# Patient Record
Sex: MALE | Race: WHITE | NOT HISPANIC OR LATINO | Employment: FULL TIME | URBAN - METROPOLITAN AREA
[De-identification: names, ages, dates, MRNs, and addresses within clinical notes are randomized per-mention and may not be internally consistent; named-entity substitution may affect disease eponyms.]

---

## 2017-01-04 ENCOUNTER — TRANSFERRED RECORDS (OUTPATIENT)
Dept: HEALTH INFORMATION MANAGEMENT | Facility: CLINIC | Age: 37
End: 2017-01-04

## 2017-02-03 ENCOUNTER — OFFICE VISIT (OUTPATIENT)
Dept: FAMILY MEDICINE | Facility: CLINIC | Age: 37
End: 2017-02-03
Payer: COMMERCIAL

## 2017-02-03 VITALS
HEART RATE: 62 BPM | DIASTOLIC BLOOD PRESSURE: 62 MMHG | SYSTOLIC BLOOD PRESSURE: 110 MMHG | OXYGEN SATURATION: 99 % | TEMPERATURE: 97.3 F | BODY MASS INDEX: 24.41 KG/M2 | RESPIRATION RATE: 16 BRPM | HEIGHT: 69 IN | WEIGHT: 164.8 LBS

## 2017-02-03 DIAGNOSIS — Z00.01 ENCOUNTER FOR ROUTINE ADULT HEALTH EXAMINATION WITH ABNORMAL FINDINGS: Primary | ICD-10-CM

## 2017-02-03 DIAGNOSIS — I10 ESSENTIAL HYPERTENSION: ICD-10-CM

## 2017-02-03 DIAGNOSIS — Z23 NEED FOR PROPHYLACTIC VACCINATION AND INOCULATION AGAINST INFLUENZA: ICD-10-CM

## 2017-02-03 DIAGNOSIS — J45.20 MILD INTERMITTENT ASTHMA WITHOUT COMPLICATION: ICD-10-CM

## 2017-02-03 LAB
ALBUMIN SERPL-MCNC: 3.8 G/DL (ref 3.4–5)
ALP SERPL-CCNC: 64 U/L (ref 40–150)
ALT SERPL W P-5'-P-CCNC: 23 U/L (ref 0–70)
ANION GAP SERPL CALCULATED.3IONS-SCNC: 5 MMOL/L (ref 3–14)
AST SERPL W P-5'-P-CCNC: 14 U/L (ref 0–45)
BILIRUB SERPL-MCNC: 0.5 MG/DL (ref 0.2–1.3)
BUN SERPL-MCNC: 13 MG/DL (ref 7–30)
CALCIUM SERPL-MCNC: 9.7 MG/DL (ref 8.5–10.1)
CHLORIDE SERPL-SCNC: 105 MMOL/L (ref 94–109)
CHOLEST SERPL-MCNC: 179 MG/DL
CO2 SERPL-SCNC: 32 MMOL/L (ref 20–32)
CREAT SERPL-MCNC: 0.99 MG/DL (ref 0.66–1.25)
CREAT UR-MCNC: 227 MG/DL
ERYTHROCYTE [DISTWIDTH] IN BLOOD BY AUTOMATED COUNT: 12 % (ref 10–15)
GFR SERPL CREATININE-BSD FRML MDRD: 85 ML/MIN/1.7M2
GLUCOSE SERPL-MCNC: 89 MG/DL (ref 70–99)
HCT VFR BLD AUTO: 40.6 % (ref 40–53)
HDLC SERPL-MCNC: 57 MG/DL
HGB BLD-MCNC: 13.8 G/DL (ref 13.3–17.7)
LDLC SERPL CALC-MCNC: 109 MG/DL
MCH RBC QN AUTO: 30.7 PG (ref 26.5–33)
MCHC RBC AUTO-ENTMCNC: 34 G/DL (ref 31.5–36.5)
MCV RBC AUTO: 90 FL (ref 78–100)
MICROALBUMIN UR-MCNC: 7 MG/L
MICROALBUMIN/CREAT UR: 3.02 MG/G CR (ref 0–17)
NONHDLC SERPL-MCNC: 122 MG/DL
PLATELET # BLD AUTO: 316 10E9/L (ref 150–450)
POTASSIUM SERPL-SCNC: 4.9 MMOL/L (ref 3.4–5.3)
PROT SERPL-MCNC: 7.9 G/DL (ref 6.8–8.8)
RBC # BLD AUTO: 4.49 10E12/L (ref 4.4–5.9)
SODIUM SERPL-SCNC: 142 MMOL/L (ref 133–144)
TRIGL SERPL-MCNC: 64 MG/DL
WBC # BLD AUTO: 3.7 10E9/L (ref 4–11)

## 2017-02-03 PROCEDURE — 90471 IMMUNIZATION ADMIN: CPT | Performed by: FAMILY MEDICINE

## 2017-02-03 PROCEDURE — 90686 IIV4 VACC NO PRSV 0.5 ML IM: CPT | Performed by: FAMILY MEDICINE

## 2017-02-03 PROCEDURE — 80053 COMPREHEN METABOLIC PANEL: CPT | Performed by: FAMILY MEDICINE

## 2017-02-03 PROCEDURE — 85027 COMPLETE CBC AUTOMATED: CPT | Performed by: FAMILY MEDICINE

## 2017-02-03 PROCEDURE — 99395 PREV VISIT EST AGE 18-39: CPT | Mod: 25 | Performed by: FAMILY MEDICINE

## 2017-02-03 PROCEDURE — 82043 UR ALBUMIN QUANTITATIVE: CPT | Performed by: FAMILY MEDICINE

## 2017-02-03 PROCEDURE — 36415 COLL VENOUS BLD VENIPUNCTURE: CPT | Performed by: FAMILY MEDICINE

## 2017-02-03 PROCEDURE — 80061 LIPID PANEL: CPT | Performed by: FAMILY MEDICINE

## 2017-02-03 RX ORDER — LISINOPRIL AND HYDROCHLOROTHIAZIDE 12.5; 2 MG/1; MG/1
1 TABLET ORAL DAILY
Qty: 90 TABLET | Refills: 3 | Status: SHIPPED | OUTPATIENT
Start: 2017-02-03 | End: 2018-02-13

## 2017-02-03 NOTE — NURSING NOTE
"Chief Complaint   Patient presents with     Physical       Initial /62 mmHg  Pulse 62  Temp(Src) 97.3  F (36.3  C) (Oral)  Resp 16  Ht 5' 9\" (1.753 m)  Wt 164 lb 12.8 oz (74.753 kg)  BMI 24.33 kg/m2  SpO2 99% Estimated body mass index is 24.33 kg/(m^2) as calculated from the following:    Height as of this encounter: 5' 9\" (1.753 m).    Weight as of this encounter: 164 lb 12.8 oz (74.753 kg).  BP completed using cuff size: cody Velazquez CMA      "

## 2017-02-03 NOTE — PROGRESS NOTES
SUBJECTIVE:     CC: Greg Tripp is an 36 year old male who presents for preventative health visit.     Physical  Annual:     Getting at least 3 servings of Calcium per day::  Yes    Bi-annual eye exam::  Yes    Dental care twice a year::  Yes    Sleep apnea or symptoms of sleep apnea::  None    Diet::  Low salt    Frequency of exercise::  2-3 days/week    Duration of exercise::  45-60 minutes    Taking medications regularly::  Yes    Medication side effects::  None    Additional concerns today::  YES    Today's PHQ-2 Score:   PHQ-2 ( 1999 Pfizer) 2/3/2017   Q1: Little interest or pleasure in doing things -   Q2: Feeling down, depressed or hopeless -   PHQ-2 Score -   Little interest or pleasure in doing things Not at all   Feeling down, depressed or hopeless Not at all   PHQ-2 Score 0       Abuse: Current or Past(Physical, Sexual or Emotional)- No  Do you feel safe in your environment - Yes    Social History   Substance Use Topics     Smoking status: Never Smoker      Smokeless tobacco: Never Used     Alcohol Use: 1.0 oz/week      Comment: occ.     The patient does not drink >3 drinks per day nor >7 drinks per week.    Last PSA: No results found for: PSA    Recent Labs   Lab Test  02/05/16   1206   CHOL  182   HDL  69   LDL  96   TRIG  87   NHDL  113       Reviewed orders with patient. Reviewed health maintenance and updated orders accordingly - Yes    All Histories reviewed and updated in Epic.      ROS:  C: NEGATIVE for fever, chills, change in weight  I: NEGATIVE for worrisome rashes, moles or lesions  E: NEGATIVE for vision changes or irritation  ENT: NEGATIVE for ear, mouth and throat problems  R: NEGATIVE for significant cough or SOB  CV: NEGATIVE for chest pain, palpitations or peripheral edema  GI: NEGATIVE for nausea, abdominal pain, heartburn, or change in bowel habits   male: negative for dysuria, hematuria, decreased urinary stream, erectile dysfunction, urethral discharge  M: NEGATIVE for  "significant arthralgias or myalgia  N: NEGATIVE for weakness, dizziness or paresthesias  P: NEGATIVE for changes in mood or affect    Problem list, Medication list, Allergies, and Medical/Social/Surgical histories reviewed in [facility] and updated as appropriate.  OBJECTIVE:     /62 mmHg  Pulse 62  Temp(Src) 97.3  F (36.3  C) (Oral)  Resp 16  Ht 5' 9\" (1.753 m)  Wt 164 lb 12.8 oz (74.753 kg)  BMI 24.33 kg/m2  SpO2 99%  EXAM:  GENERAL: healthy, alert and no distress  EYES: Eyes grossly normal to inspection, PERRL and conjunctivae and sclerae normal  HENT: ear canals and TM's normal, nose and mouth without ulcers or lesions  NECK: no adenopathy, no asymmetry, masses, or scars and thyroid normal to palpation  RESP: lungs clear to auscultation - no rales, rhonchi or wheezes  CV: regular rate and rhythm, normal S1 S2, no S3 or S4, no murmur, click or rub, no peripheral edema and peripheral pulses strong  ABDOMEN: soft, nontender, no hepatosplenomegaly, no masses and bowel sounds normal  MS: no gross musculoskeletal defects noted, no edema  SKIN: no suspicious lesions or rashes  NEURO: Normal strength and tone, mentation intact and speech normal  PSYCH: mentation appears normal, affect normal/bright    ASSESSMENT/PLAN:         ICD-10-CM    1. Encounter for routine adult health examination with abnormal findings Z00.01 Lipid Profile with reflex to direct LDL     JUST IN CASE     CBC with platelets   2. Need for prophylactic vaccination and inoculation against influenza Z23 FLU VAC, SPLIT VIRUS IM > 3 YO (QUADRIVALENT) [16499]     Vaccine Administration, Initial [73842]   3. Mild intermittent asthma without complication J45.20    4. Essential hypertension I10 Albumin Random Urine Quantitative     lisinopril-hydrochlorothiazide (PRINZIDE/ZESTORETIC) 20-12.5 MG per tablet     Comprehensive metabolic panel       COUNSELING:   Reviewed preventive health counseling, as reflected in patient instructions       Regular " "exercise       Healthy diet/nutrition       Vaccinated for: Influenza       HIV screeninx in teen years, 1x in adult years, and at intervals if high risk         reports that he has never smoked. He has never used smokeless tobacco.    Estimated body mass index is 24.33 kg/(m^2) as calculated from the following:    Height as of this encounter: 5' 9\" (1.753 m).    Weight as of this encounter: 164 lb 12.8 oz (74.753 kg).       Counseling Resources:  ATP IV Guidelines  Pooled Cohorts Equation Calculator  FRAX Risk Assessment  ICSI Preventive Guidelines  Dietary Guidelines for Americans,   USDA's MyPlate  ASA Prophylaxis  Lung CA Screening    Eloy Gillespie MD  [facility]  Answers for HPI/ROS submitted by the patient on 2/3/2017   PHQ-2 Depressed: Not at all, Not at all  PHQ-2 Score: 0      "

## 2017-02-03 NOTE — PROGRESS NOTES
Injectable Influenza Immunization Documentation    1.  Is the person to be vaccinated sick today?  No, has congestion    2. Does the person to be vaccinated have an allergy to eggs or to a component of the vaccine?  No    3. Has the person to be vaccinated today ever had a serious reaction to influenza vaccine in the past?  No    4. Has the person to be vaccinated ever had Guillain-Montague syndrome?  No     Form completed by Debbie Velazquez CMA

## 2017-02-03 NOTE — MR AVS SNAPSHOT
After Visit Summary   2/3/2017    Greg Tripp    MRN: [id number]           Patient Information     Date Of Birth          1980        Visit Information        Provider Department      2/3/2017 9:00 AM Eloy Gillespie MD [facility]        Today's Diagnoses     Encounter for routine adult health examination with abnormal findings    -  1     Need for prophylactic vaccination and inoculation against influenza         Mild intermittent asthma without complication         Essential hypertension           Care Instructions      Preventive Health Recommendations  Male Ages 26 - 39    Yearly exam:             See your health care provider every year in order to  o   Review health changes.   o   Discuss preventive care.    o   Review your medicines if your doctor has prescribed any.    You should be tested each year for STDs (sexually transmitted diseases), if you re at risk.     After age 35, talk to your provider about cholesterol testing. If you are at risk for heart disease, have your cholesterol tested at least every 5 years.     If you are at risk for diabetes, you should have a diabetes test (fasting glucose).  Shots: Get a flu shot each year. Get a tetanus shot every 10 years.     Nutrition:    Eat at least 5 servings of fruits and vegetables daily.     Eat whole-grain bread, whole-wheat pasta and brown rice instead of white grains and rice.     Talk to your provider about Calcium and Vitamin D.     Lifestyle    Exercise for at least 150 minutes a week (30 minutes a day, 5 days a week). This will help you control your weight and prevent disease.     Limit alcohol to one drink per day.     No smoking.     Wear sunscreen to prevent skin cancer.     See your dentist every six months for an exam and cleaning.             Follow-ups after your visit        Who to contact     If you have questions or need follow up information about today's clinic visit or your schedule  "please contact [facility] directly at [phone].  Normal or non-critical lab and imaging results will be communicated to you by MyChart, letter or phone within 4 business days after the clinic has received the results. If you do not hear from us within 7 days, please contact the clinic through AQHhart or phone. If you have a critical or abnormal lab result, we will notify you by phone as soon as possible.  Submit refill requests through "[employer] or call your pharmacy and they will forward the refill request to us. Please allow 3 business days for your refill to be completed.          Additional Information About Your Visit        AQHharWebcollage Information     "[employer] gives you secure access to your electronic health record. If you see a primary care provider, you can also send messages to your care team and make appointments. If you have questions, please call your primary care clinic.  If you do not have a primary care provider, please call [phone] and they will assist you.        Care EveryWhere ID     This is your Care EveryWhere ID. This could be used by other organizations to access your [place] medical records  [id number]        Your Vitals Were     Pulse Temperature Respirations Height BMI (Body Mass Index) Pulse Oximetry    62 97.3  F (36.3  C) (Oral) 16 5' 9\" (1.753 m) 24.33 kg/m2 99%       Blood Pressure from Last 3 Encounters:   02/03/17 110/62   12/19/16 122/86   05/09/16 130/78    Weight from Last 3 Encounters:   02/03/17 164 lb 12.8 oz (74.753 kg)   12/19/16 166 lb 6 oz (75.467 kg)   05/09/16 172 lb (78.019 kg)              We Performed the Following     Albumin Random Urine Quantitative     CBC with platelets     FLU VAC, SPLIT VIRUS IM > 3 YO (QUADRIVALENT) [76546]     JUST IN CASE     Lipid Profile with reflex to direct LDL     Vaccine Administration, Initial [86545]          Today's Medication Changes          These changes are accurate as of: 2/3/17  9:52 AM.  If you have " any questions, ask your nurse or doctor.               These medicines have changed or have updated prescriptions.        Dose/Directions    lisinopril-hydrochlorothiazide 20-12.5 MG per tablet   Commonly known as:  PRINZIDE/ZESTORETIC   This may have changed:  additional instructions   Used for:  Essential hypertension   Changed by:  Eloy Gillespie MD        Dose:  1 tablet   Take 1 tablet by mouth daily   Quantity:  90 tablet   Refills:  3            Where to get your medicines      These medications were sent to [id number] - [place], [place] - [address] AT [facility] [address]  [address], [place] [address]    Hours:  24-hours Phone:  [phone]    - lisinopril-hydrochlorothiazide 20-12.5 MG per tablet             Primary Care Provider Office Phone # Fax #    Eloy Gillespie [phone]       [facility] [address]  [facility] [address]        Thank you!     Thank you for choosing [facility]  for your care. Our goal is always to provide you with excellent care. Hearing back from our patients is one way we can continue to improve our services. Please take a few minutes to complete the written survey that you may receive in the mail after your visit with us. Thank you!             Your Updated Medication List - Protect others around you: Learn how to safely use, store and throw away your medicines at www.disposemymeds.org.          This list is accurate as of: 2/3/17  9:52 AM.  Always use your most recent med list.                   Brand Name Dispense Instructions for use    albuterol 108 (90 BASE) MCG/ACT Inhaler    albuterol    1 Inhaler    Inhale 2 puffs into the lungs 4 times daily as needed for shortness of breath / dyspnea or wheezing       lisinopril-hydrochlorothiazide 20-12.5 MG per tablet    PRINZIDE/ZESTORETIC    90 tablet    Take 1 tablet by mouth daily       UNABLE TO FIND      MEDICATION NAME: topical  steroid

## 2017-02-23 ASSESSMENT — ASTHMA QUESTIONNAIRES: ACT_TOTALSCORE: 25

## 2017-12-24 ENCOUNTER — TRANSFERRED RECORDS (OUTPATIENT)
Dept: HEALTH INFORMATION MANAGEMENT | Facility: CLINIC | Age: 37
End: 2017-12-24

## 2017-12-25 ENCOUNTER — TRANSFERRED RECORDS (OUTPATIENT)
Dept: HEALTH INFORMATION MANAGEMENT | Facility: CLINIC | Age: 37
End: 2017-12-25

## 2017-12-26 ENCOUNTER — OFFICE VISIT (OUTPATIENT)
Dept: FAMILY MEDICINE | Facility: CLINIC | Age: 37
End: 2017-12-26
Payer: COMMERCIAL

## 2017-12-26 VITALS
BODY MASS INDEX: 25.42 KG/M2 | RESPIRATION RATE: 16 BRPM | DIASTOLIC BLOOD PRESSURE: 69 MMHG | TEMPERATURE: 98.1 F | OXYGEN SATURATION: 100 % | SYSTOLIC BLOOD PRESSURE: 115 MMHG | WEIGHT: 172.13 LBS | HEART RATE: 70 BPM

## 2017-12-26 DIAGNOSIS — N30.01 ACUTE CYSTITIS WITH HEMATURIA: Primary | ICD-10-CM

## 2017-12-26 LAB
ALBUMIN UR-MCNC: NEGATIVE MG/DL
APPEARANCE UR: CLEAR
BACTERIA #/AREA URNS HPF: ABNORMAL /HPF
BILIRUB UR QL STRIP: NEGATIVE
COLOR UR AUTO: YELLOW
GLUCOSE UR STRIP-MCNC: NEGATIVE MG/DL
HGB UR QL STRIP: ABNORMAL
HYALINE CASTS #/AREA URNS LPF: ABNORMAL /LPF
KETONES UR STRIP-MCNC: NEGATIVE MG/DL
LEUKOCYTE ESTERASE UR QL STRIP: NEGATIVE
NITRATE UR QL: NEGATIVE
NON-SQ EPI CELLS #/AREA URNS LPF: ABNORMAL /LPF
PH UR STRIP: 7 PH (ref 5–7)
RBC #/AREA URNS AUTO: ABNORMAL /HPF
SOURCE: ABNORMAL
SP GR UR STRIP: 1.01 (ref 1–1.03)
TRANS CELLS #/AREA URNS HPF: ABNORMAL /HPF
UROBILINOGEN UR STRIP-ACNC: 0.2 EU/DL (ref 0.2–1)
WBC #/AREA URNS AUTO: ABNORMAL /HPF

## 2017-12-26 PROCEDURE — 99213 OFFICE O/P EST LOW 20 MIN: CPT | Performed by: FAMILY MEDICINE

## 2017-12-26 PROCEDURE — 81001 URINALYSIS AUTO W/SCOPE: CPT | Performed by: FAMILY MEDICINE

## 2017-12-26 RX ORDER — CEFDINIR 300 MG/1
300 CAPSULE ORAL 2 TIMES DAILY
COMMUNITY
Start: 2017-12-24 | End: 2018-01-03

## 2017-12-26 NOTE — PROGRESS NOTES
SUBJECTIVE:   Greg Tripp is a 37 year old male who presents to clinic today for the following health issues:    ED/UC Followup:    Facility:  [facility]   Date of visit: 12/24/17  Reason for visit: blood in urine   Current Status: improved.       Patient seen for +UTI in Urgency Room in [place].  Here today for FU.  Feeling much better on Cefdinir 300mg once daily.  Urgency and frequency have resolved.    Problem list and histories reviewed & adjusted, as indicated.  Additional history: as documented    Patient Active Problem List   Diagnosis     Viral warts     Seasonal allergic rhinitis     Hypertension     Mild intermittent asthma without complication     Past Surgical History:   Procedure Laterality Date     C APPENDECTOMY  1/04     SURGICAL HISTORY OF -   7/03    right knee arthoscopy       Social History   Substance Use Topics     Smoking status: Never Smoker     Smokeless tobacco: Never Used     Alcohol use 1.0 oz/week      Comment: occ.     Family History   Problem Relation Age of Onset     Depression Paternal Grandmother      Hypertension Father      Family History Negative Mother          Current Outpatient Prescriptions   Medication Sig Dispense Refill     cefdinir (OMNICEF) 300 MG capsule Take 300 mg by mouth 2 times daily       UNABLE TO FIND MEDICATION NAME: topical steroid       lisinopril-hydrochlorothiazide (PRINZIDE/ZESTORETIC) 20-12.5 MG per tablet Take 1 tablet by mouth daily 90 tablet 3     albuterol (ALBUTEROL) 108 (90 BASE) MCG/ACT inhaler Inhale 2 puffs into the lungs 4 times daily as needed for shortness of breath / dyspnea or wheezing 1 Inhaler PRN     Allergies   Allergen Reactions     Penicillins      BP Readings from Last 3 Encounters:   12/26/17 115/69   02/03/17 110/62   12/19/16 122/86    Wt Readings from Last 3 Encounters:   12/26/17 172 lb 2 oz (78.1 kg)   02/03/17 164 lb 12.8 oz (74.8 kg)   12/19/16 166 lb 6 oz (75.5 kg)         Reviewed and updated as needed  this visit by clinical staffTobacco  Allergies  Meds  Med Hx  Surg Hx  Fam Hx  Soc Hx      Reviewed and updated as needed this visit by Provider         ROS:  Constitutional, HEENT, cardiovascular, pulmonary, gi and gu systems are negative, except as otherwise noted.      OBJECTIVE:   /69 (BP Location: Left arm, Patient Position: Sitting, Cuff Size: Adult Regular)  Pulse 70  Temp 98.1  F (36.7  C) (Oral)  Resp 16  Wt 172 lb 2 oz (78.1 kg)  SpO2 100%  BMI 25.42 kg/m2  Body mass index is 25.42 kg/(m^2).  GENERAL: healthy, alert and no distress  EYES: Eyes grossly normal to inspection, PERRL and conjunctivae and sclerae normal  HENT: ear canals and TM's normal, nose and mouth without ulcers or lesions  NECK: no adenopathy, no asymmetry, masses, or scars and thyroid normal to palpation  RESP: lungs clear to auscultation - no rales, rhonchi or wheezes  CV: regular rate and rhythm, normal S1 S2, no S3 or S4, no murmur, click or rub, no peripheral edema and peripheral pulses strong  ABDOMEN: soft, nontender, no hepatosplenomegaly, no masses and bowel sounds normal  MS: no gross musculoskeletal defects noted, no edema  SKIN: no suspicious lesions or rashes  BACK: no CVA tenderness, no paralumbar tenderness  PSYCH: mentation appears normal, affect normal/bright    Diagnostic Test Results:  Results for orders placed or performed in visit on 12/26/17 (from the past 24 hour(s))   UA reflex to Microscopic and Culture   Result Value Ref Range    Color Urine Yellow     Appearance Urine Clear     Glucose Urine Negative NEG^Negative mg/dL    Bilirubin Urine Negative NEG^Negative    Ketones Urine Negative NEG^Negative mg/dL    Specific Gravity Urine 1.015 1.003 - 1.035    Blood Urine Trace (A) NEG^Negative    pH Urine 7.0 5.0 - 7.0 pH    Protein Albumin Urine Negative NEG^Negative mg/dL    Urobilinogen Urine 0.2 0.2 - 1.0 EU/dL    Nitrite Urine Negative NEG^Negative    Leukocyte Esterase Urine Negative NEG^Negative     Source Midstream Urine    Urine Microscopic   Result Value Ref Range    WBC Urine O - 2 OTO2^O - 2 /HPF    RBC Urine O - 2 OTO2^O - 2 /HPF    Hyaline Casts O - 2 OTO2^O - 2 /LPF    Squamous Epithelial /LPF Urine Few FEW^Few /LPF    Transitional Epi Few FEW^Few /HPF    Bacteria Urine Few (A) NEG^Negative /HPF       ASSESSMENT/PLAN:     1. Acute cystitis with hematuria    - UA reflex to Microscopic and Culture  - Urine Microscopic    Improving on Cefdinir.  Continue increased fluids.  FU if no change or worsening symptoms prn.    Denise Marques MD  [facility]

## 2017-12-26 NOTE — MR AVS SNAPSHOT
After Visit Summary   12/26/2017    Greg Tripp    MRN: [id number]           Patient Information     Date Of Birth          1980        Visit Information        Provider Department      12/26/2017 10:20 AM Denise Marques MD [facility]        Today's Diagnoses     Acute cystitis with hematuria    -  1       Follow-ups after your visit        Follow-up notes from your care team     Return if symptoms worsen or fail to improve.      Who to contact     If you have questions or need follow up information about today's clinic visit or your schedule please contact [facility] directly at [phone].  Normal or non-critical lab and imaging results will be communicated to you by MyChart, letter or phone within 4 business days after the clinic has received the results. If you do not hear from us within 7 days, please contact the clinic through [employer] or phone. If you have a critical or abnormal lab result, we will notify you by phone as soon as possible.  Submit refill requests through [employer] or call your pharmacy and they will forward the refill request to us. Please allow 3 business days for your refill to be completed.          Additional Information About Your Visit        MyChart Information     [employer] gives you secure access to your electronic health record. If you see a primary care provider, you can also send messages to your care team and make appointments. If you have questions, please call your primary care clinic.  If you do not have a primary care provider, please call [phone] and they will assist you.        Care EveryWhere ID     This is your Care EveryWhere ID. This could be used by other organizations to access your [place] medical records  [id number]        Your Vitals Were     Pulse Temperature Respirations Pulse Oximetry BMI (Body Mass Index)       70 98.1  F (36.7  C) (Oral) 16 100% 25.42 kg/m2         Blood Pressure from Last 3 Encounters:   12/26/17 115/69   02/03/17 110/62   12/19/16 122/86    Weight from Last 3 Encounters:   12/26/17 172 lb 2 oz (78.1 kg)   02/03/17 164 lb 12.8 oz (74.8 kg)   12/19/16 166 lb 6 oz (75.5 kg)              We Performed the Following     UA reflex to Microscopic and Culture     Urine Microscopic        Primary Care Provider Office Phone # Fax #    Eloy Gillespie [phone] [address]  [facility] [address]        Equal Access to Services     [facility] ServicesEVONNE : Hadii aad ku hadasho Soomaali, waaxda luqadaha, qaybta kaalmada adeegyada, julianna jackson . So [facility] [phone].    ATENCIÓN: Si habla español, tiene a villela disposición servicios gratuitos de asistencia lingüística. LlFirelands Regional Medical Center South Campus [phone].    We comply with applicable federal civil rights laws and [place] laws. We do not discriminate on the basis of race, color, national origin, age, disability, sex, sexual orientation, or gender identity.            Thank you!     Thank you for choosing [facility]  for your care. Our goal is always to provide you with excellent care. Hearing back from our patients is one way we can continue to improve our services. Please take a few minutes to complete the written survey that you may receive in the mail after your visit with us. Thank you!             Your Updated Medication List - Protect others around you: Learn how to safely use, store and throw away your medicines at www.disposemymeds.org.          This list is accurate as of: 12/26/17 11:01 AM.  Always use your most recent med list.                   Brand Name Dispense Instructions for use Diagnosis    albuterol 108 (90 BASE) MCG/ACT Inhaler    PROAIR HFA    1 Inhaler    Inhale 2 puffs into the lungs 4 times daily as needed for shortness of breath / dyspnea or wheezing    Mild intermittent asthma, uncomplicated       cefdinir 300 MG capsule    OMNICEF     Take 300 mg by  mouth 2 times daily        lisinopril-hydrochlorothiazide 20-12.5 MG per tablet    PRINZIDE/ZESTORETIC    90 tablet    Take 1 tablet by mouth daily    Essential hypertension       UNABLE TO FIND      MEDICATION NAME: topical steroid

## 2018-01-08 ENCOUNTER — MYC MEDICAL ADVICE (OUTPATIENT)
Dept: FAMILY MEDICINE | Facility: CLINIC | Age: 38
End: 2018-01-08

## 2018-01-08 DIAGNOSIS — R30.0 DYSURIA: Primary | ICD-10-CM

## 2018-01-08 NOTE — TELEPHONE ENCOUNTER
See pt msg  His sx are better but not completely gone. Should he come in for a follow up ua?    Thanks!     Dora Rodney RN

## 2018-01-11 DIAGNOSIS — R30.0 DYSURIA: ICD-10-CM

## 2018-01-11 LAB
ALBUMIN UR-MCNC: NEGATIVE MG/DL
APPEARANCE UR: CLEAR
BACTERIA #/AREA URNS HPF: ABNORMAL /HPF
BILIRUB UR QL STRIP: NEGATIVE
COLOR UR AUTO: YELLOW
GLUCOSE UR STRIP-MCNC: NEGATIVE MG/DL
HGB UR QL STRIP: NEGATIVE
KETONES UR STRIP-MCNC: NEGATIVE MG/DL
LEUKOCYTE ESTERASE UR QL STRIP: ABNORMAL
NITRATE UR QL: NEGATIVE
PH UR STRIP: 6.5 PH (ref 5–7)
RBC #/AREA URNS AUTO: ABNORMAL /HPF
SOURCE: ABNORMAL
SP GR UR STRIP: 1.01 (ref 1–1.03)
UROBILINOGEN UR STRIP-ACNC: 0.2 EU/DL (ref 0.2–1)
WBC #/AREA URNS AUTO: ABNORMAL /HPF

## 2018-01-11 PROCEDURE — 87086 URINE CULTURE/COLONY COUNT: CPT | Performed by: FAMILY MEDICINE

## 2018-01-11 PROCEDURE — 81001 URINALYSIS AUTO W/SCOPE: CPT | Performed by: FAMILY MEDICINE

## 2018-01-12 ENCOUNTER — MYC MEDICAL ADVICE (OUTPATIENT)
Dept: FAMILY MEDICINE | Facility: CLINIC | Age: 38
End: 2018-01-12

## 2018-01-12 DIAGNOSIS — N34.1 URETHRITIS, NONSPECIFIC: Primary | ICD-10-CM

## 2018-01-12 DIAGNOSIS — N34.2 URETHRITIS: Primary | ICD-10-CM

## 2018-01-12 RX ORDER — SULFAMETHOXAZOLE/TRIMETHOPRIM 800-160 MG
1 TABLET ORAL 2 TIMES DAILY
Qty: 28 TABLET | Refills: 0 | Status: SHIPPED | OUTPATIENT
Start: 2018-01-12 | End: 2018-02-20

## 2018-01-13 LAB
BACTERIA SPEC CULT: NORMAL
Lab: NORMAL
SPECIMEN SOURCE: NORMAL

## 2018-01-26 ENCOUNTER — OFFICE VISIT (OUTPATIENT)
Dept: FAMILY MEDICINE | Facility: CLINIC | Age: 38
End: 2018-01-26
Payer: COMMERCIAL

## 2018-01-26 VITALS
DIASTOLIC BLOOD PRESSURE: 75 MMHG | SYSTOLIC BLOOD PRESSURE: 116 MMHG | OXYGEN SATURATION: 99 % | WEIGHT: 172.5 LBS | TEMPERATURE: 96.9 F | HEART RATE: 76 BPM | BODY MASS INDEX: 25.55 KG/M2 | RESPIRATION RATE: 18 BRPM | HEIGHT: 69 IN

## 2018-01-26 DIAGNOSIS — N39.0 URINARY TRACT INFECTION WITHOUT HEMATURIA, SITE UNSPECIFIED: Primary | ICD-10-CM

## 2018-01-26 DIAGNOSIS — N34.2 URETHRITIS: ICD-10-CM

## 2018-01-26 LAB
ALBUMIN UR-MCNC: NEGATIVE MG/DL
APPEARANCE UR: CLEAR
BACTERIA #/AREA URNS HPF: ABNORMAL /HPF
BILIRUB UR QL STRIP: NEGATIVE
COLOR UR AUTO: YELLOW
GLUCOSE UR STRIP-MCNC: NEGATIVE MG/DL
HGB UR QL STRIP: ABNORMAL
KETONES UR STRIP-MCNC: NEGATIVE MG/DL
LEUKOCYTE ESTERASE UR QL STRIP: ABNORMAL
NITRATE UR QL: NEGATIVE
PH UR STRIP: 7 PH (ref 5–7)
RBC #/AREA URNS AUTO: ABNORMAL /HPF
SOURCE: ABNORMAL
SP GR UR STRIP: 1.01 (ref 1–1.03)
UROBILINOGEN UR STRIP-ACNC: 0.2 EU/DL (ref 0.2–1)
WBC #/AREA URNS AUTO: ABNORMAL /HPF

## 2018-01-26 PROCEDURE — 87186 SC STD MICRODIL/AGAR DIL: CPT | Performed by: FAMILY MEDICINE

## 2018-01-26 PROCEDURE — 99213 OFFICE O/P EST LOW 20 MIN: CPT | Performed by: FAMILY MEDICINE

## 2018-01-26 PROCEDURE — 81001 URINALYSIS AUTO W/SCOPE: CPT | Performed by: FAMILY MEDICINE

## 2018-01-26 PROCEDURE — 87086 URINE CULTURE/COLONY COUNT: CPT | Performed by: FAMILY MEDICINE

## 2018-01-26 PROCEDURE — 87088 URINE BACTERIA CULTURE: CPT | Performed by: FAMILY MEDICINE

## 2018-01-26 RX ORDER — CEFDINIR 300 MG/1
300 CAPSULE ORAL 2 TIMES DAILY
Qty: 28 CAPSULE | Refills: 0 | Status: SHIPPED | OUTPATIENT
Start: 2018-01-26 | End: 2018-02-20

## 2018-01-26 NOTE — PROGRESS NOTES
"  SUBJECTIVE:   Greg Tripp is a 37 year old male who presents to clinic today for the following health issues:  {Provider please address medication reconciliation discrepancies--rooming staff please delete if no med/rec issues}    1. Follow up urethritis.     {additional problems for provider to add:233529}    Problem list and histories reviewed & adjusted, as indicated.  Additional history: {NONE - AS DOCUMENTED:658560::\"as documented\"}    {HIST REVIEW/ LINKS 2:339558}    Reviewed and updated as needed this visit by clinical staff  Tobacco  Meds  Med Hx  Surg Hx  Fam Hx  Soc Hx      Reviewed and updated as needed this visit by Provider         {PROVIDER CHARTING PREFERENCE:015070}  "

## 2018-01-26 NOTE — MR AVS SNAPSHOT
After Visit Summary   1/26/2018    Greg Tripp    MRN: [id number]           Patient Information     Date Of Birth          1980        Visit Information        Provider Department      1/26/2018 9:00 AM Denise Marques MD [facility]        Today's Diagnoses     Nonspecific finding on examination of urine    -  1    Urethritis           Follow-ups after your visit        Additional Services     UROLOGY ADULT REFERRAL       Your provider has referred you to: [facility]: [facility] Urology [facility] [phone]   [email or web address]    Please be aware that coverage of these services is subject to the terms and limitations of your health insurance plan.  Call member services at your health plan with any benefit or coverage questions.      Please bring the following with you to your appointment:    (1) Any X-Rays, CTs or MRIs which have been performed.  Contact the facility where they were done to arrange for  prior to your scheduled appointment.    (2) List of current medications  (3) This referral request   (4) Any documents/labs given to you for this referral                  Your next 10 appointments already scheduled     Feb 06, 2018 11:20 AM CST   PHYSICAL with Eloy Gillespie MD   [facility] ([facility])    [address] [place] [address] [phone]              Who to contact     If you have questions or need follow up information about today's clinic visit or your schedule please contact [facility] directly at [phone].  Normal or non-critical lab and imaging results will be communicated to you by MyChart, letter or phone within 4 business days after the clinic has received the results. If you do not hear from us within 7 days, please contact the clinic through MyChart or phone. If you have a critical or abnormal lab result, we  "will notify you by phone as soon as possible.  Submit refill requests through [employer] or call your pharmacy and they will forward the refill request to us. Please allow 3 business days for your refill to be completed.          Additional Information About Your Visit        [employer] Information     [employer] gives you secure access to your electronic health record. If you see a primary care provider, you can also send messages to your care team and make appointments. If you have questions, please call your primary care clinic.  If you do not have a primary care provider, please call [phone] and they will assist you.        Care EveryWhere ID     This is your Care EveryWhere ID. This could be used by other organizations to access your [facility] medical records  [id number]        Your Vitals Were     Pulse Temperature Respirations Height Pulse Oximetry BMI (Body Mass Index)    76 96.9  F (36.1  C) (Tympanic) 18 5' 9\" (1.753 m) 99% 25.47 kg/m2       Blood Pressure from Last 3 Encounters:   01/26/18 116/75   12/26/17 115/69   02/03/17 110/62    Weight from Last 3 Encounters:   01/26/18 172 lb 8 oz (78.2 kg)   12/26/17 172 lb 2 oz (78.1 kg)   02/03/17 164 lb 12.8 oz (74.8 kg)              We Performed the Following     **UA reflex to Microscopic FUTURE 14d     Urine Culture Aerobic Bacterial     Urine Microscopic     UROLOGY ADULT REFERRAL          Today's Medication Changes          These changes are accurate as of 1/26/18 10:13 AM.  If you have any questions, ask your nurse or doctor.               Start taking these medicines.        Dose/Directions    cefdinir 300 MG capsule   Commonly known as:  OMNICEF   Used for:  Urethritis   Started by:  Denise Marques MD        Dose:  300 mg   Take 1 capsule (300 mg) by mouth 2 times daily   Quantity:  28 capsule   Refills:  0            Where to get your medicines      These medications were sent to [facility] - [place], [place] - [address]  [address], [place] [address]     Phone:  [phone]     cefdinir 300 MG capsule                Primary Care Provider Office Phone # Fax #    Eloy Gillespie [phone]       [address]  [facility] [address]        Equal Access to Services     ELIZABETH DIAS : Hadii aad ku hadasho Soomaali, waaxda luqadaha, qaybta kaalmada adeegyada, waxay idiin hayaan adeeg kharash la'aan . So [facility] [phone].    ATENCIÓN: Si habla español, tiene a villela disposición servicios gratuitos de asistencia lingüística. Llame al [phone].    We comply with applicable federal civil rights laws and [place] laws. We do not discriminate on the basis of race, color, national origin, age, disability, sex, sexual orientation, or gender identity.            Thank you!     Thank you for choosing [facility]  for your care. Our goal is always to provide you with excellent care. Hearing back from our patients is one way we can continue to improve our services. Please take a few minutes to complete the written survey that you may receive in the mail after your visit with us. Thank you!             Your Updated Medication List - Protect others around you: Learn how to safely use, store and throw away your medicines at www.disposemymeds.org.          This list is accurate as of 1/26/18 10:13 AM.  Always use your most recent med list.                   Brand Name Dispense Instructions for use Diagnosis    albuterol 108 (90 BASE) MCG/ACT Inhaler    PROAIR HFA    1 Inhaler    Inhale 2 puffs into the lungs 4 times daily as needed for shortness of breath / dyspnea or wheezing    Mild intermittent asthma, uncomplicated       cefdinir 300 MG capsule    OMNICEF    28 capsule    Take 1 capsule (300 mg) by mouth 2 times daily    Urethritis       lisinopril-hydrochlorothiazide 20-12.5 MG per tablet    PRINZIDE/ZESTORETIC    90 tablet    Take 1 tablet by mouth daily    Essential hypertension        sulfamethoxazole-trimethoprim 800-160 MG per tablet    BACTRIM DS/SEPTRA DS    28 tablet    Take 1 tablet by mouth 2 times daily    Urethritis, nonspecific       UNABLE TO FIND      MEDICATION NAME: topical steroid        ZYRTEC ALLERGY PO      Take by mouth daily

## 2018-01-26 NOTE — PROGRESS NOTES
SUBJECTIVE:                                                    Greg Tripp is a 37 year old male who presents to clinic today for the following health issues:     Presents today for FU recurrent dysuria since initial UTI diagnosed at [facility] over the holidays 12-24 after seeing blood in urine.  At that time culture did confirm > 100,000 E.Coli but I never saw FU sensitivities from them.  Patient did well on Cefdinir.  UA 12-26 was unremarkable except for few bacteria and trace blood but 0-2 RBCs on micro    On 1-8 patient called feeling symptoms did not resolve completely with continued urgency and dysuria.  He returned for UA on 1-11 showing small LE with 5-10 WBC and few bacteria.  UC was unremarkable with < 10,000 mixed urogenital bao.  He was placed on a 14 day course of Bactrim for presumed possible prostatitis.    He presents today for FU and repeat UA.  He states symptoms have actually worsened on the Bactrim.    He is in a monogamous, heterosexual relationship with wife- intercourse once weekly.  No high risk behaviors.    Problem list and histories reviewed & adjusted, as indicated.  Additional history: as documented    Patient Active Problem List   Diagnosis     Viral warts     Seasonal allergic rhinitis     Hypertension     Mild intermittent asthma without complication     Past Surgical History:   Procedure Laterality Date     C APPENDECTOMY  1/04     SURGICAL HISTORY OF -   7/03    right knee arthoscopy       Social History   Substance Use Topics     Smoking status: Never Smoker     Smokeless tobacco: Never Used     Alcohol use 1.0 oz/week      Comment: occ.     Family History   Problem Relation Age of Onset     Depression Paternal Grandmother      Hypertension Father      Family History Negative Mother          Current Outpatient Prescriptions   Medication Sig Dispense Refill     Cetirizine HCl (ZYRTEC ALLERGY PO) Take by mouth daily       cefdinir (OMNICEF) 300 MG capsule Take 1  "capsule (300 mg) by mouth 2 times daily 28 capsule 0     sulfamethoxazole-trimethoprim (BACTRIM DS/SEPTRA DS) 800-160 MG per tablet Take 1 tablet by mouth 2 times daily 28 tablet 0     UNABLE TO FIND MEDICATION NAME: topical steroid       lisinopril-hydrochlorothiazide (PRINZIDE/ZESTORETIC) 20-12.5 MG per tablet Take 1 tablet by mouth daily 90 tablet 3     albuterol (ALBUTEROL) 108 (90 BASE) MCG/ACT inhaler Inhale 2 puffs into the lungs 4 times daily as needed for shortness of breath / dyspnea or wheezing 1 Inhaler PRN     Allergies   Allergen Reactions     Penicillins      BP Readings from Last 3 Encounters:   01/26/18 116/75   12/26/17 115/69   02/03/17 110/62    Wt Readings from Last 3 Encounters:   01/26/18 172 lb 8 oz (78.2 kg)   12/26/17 172 lb 2 oz (78.1 kg)   02/03/17 164 lb 12.8 oz (74.8 kg)         ROS:  Constitutional, HEENT, cardiovascular, pulmonary, gi and gu systems are negative, except as otherwise noted.    OBJECTIVE:     /75 (BP Location: Right arm, Patient Position: Sitting, Cuff Size: Adult Regular)  Pulse 76  Temp 96.9  F (36.1  C) (Tympanic)  Resp 18  Ht 5' 9\" (1.753 m)  Wt 172 lb 8 oz (78.2 kg)  SpO2 99%  BMI 25.47 kg/m2  Body mass index is 25.47 kg/(m^2).  GENERAL: healthy, alert and no distress  EYES: Eyes grossly normal to inspection, PERRL and conjunctivae and sclerae normal  NECK: no adenopathy, no asymmetry, masses, or scars and thyroid normal to palpation  MS: no gross musculoskeletal defects noted, no edema  SKIN: no suspicious lesions or rashes  PSYCH: mentation appears normal, affect normal/bright    Diagnostic Test Results:  Results for orders placed or performed in visit on 01/26/18   **UA reflex to Microscopic FUTURE 14d   Result Value Ref Range    Color Urine Yellow     Appearance Urine Clear     Glucose Urine Negative NEG^Negative mg/dL    Bilirubin Urine Negative NEG^Negative    Ketones Urine Negative NEG^Negative mg/dL    Specific Gravity Urine 1.015 1.003 - 1.035 "    Blood Urine Trace (A) NEG^Negative    pH Urine 7.0 5.0 - 7.0 pH    Protein Albumin Urine Negative NEG^Negative mg/dL    Urobilinogen Urine 0.2 0.2 - 1.0 EU/dL    Nitrite Urine Negative NEG^Negative    Leukocyte Esterase Urine Moderate (A) NEG^Negative    Source Midstream Urine    Urine Microscopic   Result Value Ref Range    WBC Urine 10-25 (A) OTO2^O - 2 /HPF    RBC Urine O - 2 OTO2^O - 2 /HPF    Bacteria Urine Few (A) NEG^Negative /HPF       ASSESSMENT/PLAN:     1. Urethritis    - **UA reflex to Microscopic FUTURE 14d  - UROLOGY ADULT REFERRAL  - cefdinir (OMNICEF) 300 MG capsule; Take 1 capsule (300 mg) by mouth 2 times daily  Dispense: 28 capsule; Refill: 0    2. Urinary tract infection without hematuria, site unspecified    - Urine Culture Aerobic Bacterial  - Urine Microscopic    Recommend Urology appt for further evaluation in this otherwise healthy 37 year old male now with recurrent UTI.  Awaiting UC and sensitivities- back on Cefdinir at patient request for +UTI today.  Continue with increased hydration.    Denise Marques MD  [facility]

## 2018-01-30 LAB
BACTERIA SPEC CULT: ABNORMAL
SPECIMEN SOURCE: ABNORMAL

## 2018-02-13 ENCOUNTER — MYC MEDICAL ADVICE (OUTPATIENT)
Dept: FAMILY MEDICINE | Facility: CLINIC | Age: 38
End: 2018-02-13

## 2018-02-13 DIAGNOSIS — I10 ESSENTIAL HYPERTENSION: ICD-10-CM

## 2018-02-13 RX ORDER — LISINOPRIL AND HYDROCHLOROTHIAZIDE 12.5; 2 MG/1; MG/1
1 TABLET ORAL DAILY
Qty: 30 TABLET | Refills: 0 | Status: SHIPPED | OUTPATIENT
Start: 2018-02-13 | End: 2018-02-27

## 2018-02-13 NOTE — TELEPHONE ENCOUNTER
BP med refill request until appt on 2/27.  Medication is being filled for 1 time refill only due to:  Patient needs to be seen because due for a yearly visit. .has appt.  Belia Hatfield RN

## 2018-02-15 DIAGNOSIS — R30.0 DYSURIA: ICD-10-CM

## 2018-02-15 DIAGNOSIS — R30.0 DYSURIA: Primary | ICD-10-CM

## 2018-02-15 DIAGNOSIS — R82.90 NONSPECIFIC FINDING ON EXAMINATION OF URINE: Primary | ICD-10-CM

## 2018-02-15 LAB
ALBUMIN UR-MCNC: NEGATIVE MG/DL
APPEARANCE UR: ABNORMAL
BACTERIA #/AREA URNS HPF: ABNORMAL /HPF
BILIRUB UR QL STRIP: NEGATIVE
COLOR UR AUTO: YELLOW
GLUCOSE UR STRIP-MCNC: NEGATIVE MG/DL
HGB UR QL STRIP: ABNORMAL
KETONES UR STRIP-MCNC: NEGATIVE MG/DL
LEUKOCYTE ESTERASE UR QL STRIP: ABNORMAL
NITRATE UR QL: NEGATIVE
NON-SQ EPI CELLS #/AREA URNS LPF: ABNORMAL /LPF
PH UR STRIP: 7 PH (ref 5–7)
RBC #/AREA URNS AUTO: ABNORMAL /HPF
RENAL EPI CELLS #/AREA URNS HPF: ABNORMAL /HPF
SOURCE: ABNORMAL
SP GR UR STRIP: 1.01 (ref 1–1.03)
UROBILINOGEN UR STRIP-ACNC: 0.2 EU/DL (ref 0.2–1)
WBC #/AREA URNS AUTO: >100 /HPF

## 2018-02-15 PROCEDURE — 81001 URINALYSIS AUTO W/SCOPE: CPT | Performed by: FAMILY MEDICINE

## 2018-02-15 PROCEDURE — 87086 URINE CULTURE/COLONY COUNT: CPT | Performed by: FAMILY MEDICINE

## 2018-02-15 RX ORDER — CIPROFLOXACIN 500 MG/1
500 TABLET, FILM COATED ORAL 2 TIMES DAILY
Qty: 14 TABLET | Refills: 0 | Status: SHIPPED | OUTPATIENT
Start: 2018-02-15 | End: 2018-02-20

## 2018-02-15 ASSESSMENT — ENCOUNTER SYMPTOMS
DIARRHEA: 0
HEMATURIA: 0
SORE THROAT: 0
SINUS CONGESTION: 1
VOMITING: 0
NECK MASS: 0
NAUSEA: 0
TROUBLE SWALLOWING: 0
JAUNDICE: 0
SINUS PAIN: 0
DIFFICULTY URINATING: 1
RECTAL PAIN: 0
CONSTIPATION: 1
SMELL DISTURBANCE: 0
ABDOMINAL PAIN: 0
POOR WOUND HEALING: 0
NAIL CHANGES: 0
BOWEL INCONTINENCE: 0
DYSURIA: 1
TASTE DISTURBANCE: 0
HOARSE VOICE: 0
BLOATING: 0
FLANK PAIN: 0
BLOOD IN STOOL: 0
SKIN CHANGES: 0
HEARTBURN: 1

## 2018-02-16 LAB
BACTERIA SPEC CULT: NO GROWTH
SPECIMEN SOURCE: NORMAL

## 2018-02-20 ENCOUNTER — OFFICE VISIT (OUTPATIENT)
Dept: UROLOGY | Facility: CLINIC | Age: 38
End: 2018-02-20
Payer: COMMERCIAL

## 2018-02-20 VITALS — OXYGEN SATURATION: 99 % | HEIGHT: 69 IN | HEART RATE: 66 BPM | WEIGHT: 172 LBS | BODY MASS INDEX: 25.48 KG/M2

## 2018-02-20 DIAGNOSIS — N41.9 PROSTATITIS, UNSPECIFIED PROSTATITIS TYPE: Primary | ICD-10-CM

## 2018-02-20 LAB
ALBUMIN UR-MCNC: NEGATIVE MG/DL
APPEARANCE UR: CLEAR
BILIRUB UR QL STRIP: NEGATIVE
COLOR UR AUTO: YELLOW
GLUCOSE UR STRIP-MCNC: NEGATIVE MG/DL
HGB UR QL STRIP: NEGATIVE
KETONES UR STRIP-MCNC: NEGATIVE MG/DL
LEUKOCYTE ESTERASE UR QL STRIP: NEGATIVE
NITRATE UR QL: NEGATIVE
PH UR STRIP: 5.5 PH (ref 5–7)
RESIDUAL VOLUME (RV) (EXTERNAL): 201
SOURCE: NORMAL
SP GR UR STRIP: <=1.005 (ref 1–1.03)
UROBILINOGEN UR STRIP-ACNC: 0.2 EU/DL (ref 0.2–1)

## 2018-02-20 PROCEDURE — 99243 OFF/OP CNSLTJ NEW/EST LOW 30: CPT | Performed by: UROLOGY

## 2018-02-20 PROCEDURE — 81003 URINALYSIS AUTO W/O SCOPE: CPT | Mod: QW | Performed by: UROLOGY

## 2018-02-20 RX ORDER — TAMSULOSIN HYDROCHLORIDE 0.4 MG/1
0.4 CAPSULE ORAL DAILY
Qty: 30 CAPSULE | Refills: 1 | Status: SHIPPED | OUTPATIENT
Start: 2018-02-20 | End: 2019-03-12

## 2018-02-20 RX ORDER — CIPROFLOXACIN 500 MG/1
500 TABLET, FILM COATED ORAL 2 TIMES DAILY
Qty: 56 TABLET | Refills: 0 | Status: SHIPPED | OUTPATIENT
Start: 2018-02-20 | End: 2018-03-20

## 2018-02-20 ASSESSMENT — PAIN SCALES - GENERAL: PAINLEVEL: NO PAIN (0)

## 2018-02-20 NOTE — MR AVS SNAPSHOT
After Visit Summary   2/20/2018    Greg Tripp    MRN: [id number]           Patient Information     Date Of Birth          1980        Visit Information        Provider Department      2/20/2018 3:30 PM Antione Hazel MD [facility] Urology [facility]        Today's Diagnoses     Prostatitis, unspecified prostatitis type    -  1      Care Instructions    Ibuprofen 600mg 3-4 times daily 5 days (check with Primary Care Provider)  Tamsulosin 0.4mg daily x 30 days  Cipro 500mg two times daily x 4 weeks          Follow-ups after your visit        Follow-up notes from your care team     Return in about 6 weeks (around 4/3/2018).      Your next 10 appointments already scheduled     Feb 27, 2018 11:20 AM CST   PHYSICAL with Eloy Gillespie MD   [facility] ([facility])    [address] [place] [address]   [phone]            Apr 10, 2018 10:30 AM CDT   Return Visit with Antione Hazel MD   [facility] Urology [facility] ([facility])    [address]  [address]  [facility] [id number] [phone]              Who to contact     If you have questions or need follow up information about today's clinic visit or your schedule please contact [facility] UROLOGY [facility] directly at [phone].  Normal or non-critical lab and imaging results will be communicated to you by MyChart, letter or phone within 4 business days after the clinic has received the results. If you do not hear from us within 7 days, please contact the clinic through MyChart or phone. If you have a critical or abnormal lab result, we will notify you by phone as soon as possible.  Submit refill requests through [employer] or call your pharmacy and they will forward the refill request to us. Please allow 3 business days for your refill to be  "completed.          Additional Information About Your Visit        [email or web address]     [employer] gives you secure access to your electronic health record. If you see a primary care provider, you can also send messages to your care team and make appointments. If you have questions, please call your primary care clinic.  If you do not have a primary care provider, please call [phone] and they will assist you.        Care EveryWhere ID     This is your Care EveryWhere ID. This could be used by other organizations to access your [facility] medical records  [id number]        Your Vitals Were     Pulse Height Pulse Oximetry BMI (Body Mass Index)          66 1.753 m (5' 9\") 99% 25.4 kg/m2         Blood Pressure from Last 3 Encounters:   01/26/18 116/75   12/26/17 115/69   02/03/17 110/62    Weight from Last 3 Encounters:   02/20/18 78 kg (172 lb)   01/26/18 78.2 kg (172 lb 8 oz)   12/26/17 78.1 kg (172 lb 2 oz)              We Performed the Following     Bladder scan     UA without Microscopic          Today's Medication Changes          These changes are accurate as of 2/20/18 11:59 PM.  If you have any questions, ask your nurse or doctor.               Start taking these medicines.        Dose/Directions    tamsulosin 0.4 MG capsule   Commonly known as:  FLOMAX   Used for:  Prostatitis, unspecified prostatitis type   Started by:  Antione Hazel MD        Dose:  0.4 mg   Take 1 capsule (0.4 mg) by mouth daily   Quantity:  30 capsule   Refills:  1         Stop taking these medicines if you haven't already. Please contact your care team if you have questions.     cefdinir 300 MG capsule   Commonly known as:  OMNICEF   Stopped by:  Anitone Hazel MD           sulfamethoxazole-trimethoprim 800-160 MG per tablet   Commonly known as:  BACTRIM DS/SEPTRA DS   Stopped by:  Antione Hazel MD                Where to get your medicines      These medications were sent to [id number] - [place], [place] - [address] AT [facility]  [address], [place] [address]    Hours:  24-hours Phone:  [phone]     ciprofloxacin 500 MG tablet    tamsulosin 0.4 MG capsule                Primary Care Provider Office Phone # Fax #    Eloy Gonzalo Gillespie [phone] [address]  [facility] [address]        Equal Access to Services     ELIZABETH DIAS AH: Hadii aad ku hadasho Soomaali, waaxda luqadaha, qaybta kaalmada adeegyada, waxay idiin hayaan adeeg kharash la'shad . So [facility] [phone].    ATENCIÓN: Si habla español, tiene a villela disposición servicios gratuitos de asistencia lingüística. [facility] [phone].    We comply with applicable federal civil rights laws and [place] laws. We do not discriminate on the basis of race, color, national origin, age, disability, sex, sexual orientation, or gender identity.            Thank you!     Thank you for choosing [facility] UROLOGY CLINIC [place]  for your care. Our goal is always to provide you with excellent care. Hearing back from our patients is one way we can continue to improve our services. Please take a few minutes to complete the written survey that you may receive in the mail after your visit with us. Thank you!             Your Updated Medication List - Protect others around you: Learn how to safely use, store and throw away your medicines at www.disposemymeds.org.          This list is accurate as of 2/20/18 11:59 PM.  Always use your most recent med list.                   Brand Name Dispense Instructions for use Diagnosis    albuterol 108 (90 BASE) MCG/ACT Inhaler    PROAIR HFA    1 Inhaler    Inhale 2 puffs into the lungs 4 times daily as needed for shortness of breath / dyspnea or wheezing    Mild intermittent asthma, uncomplicated       ciprofloxacin 500 MG tablet    CIPRO    56 tablet    Take 1 tablet (500 mg) by mouth 2 times daily for 28 days     Prostatitis, unspecified prostatitis type       lisinopril-hydrochlorothiazide 20-12.5 MG per tablet    PRINZIDE/ZESTORETIC    30 tablet    Take 1 tablet by mouth daily    Essential hypertension       tamsulosin 0.4 MG capsule    FLOMAX    30 capsule    Take 1 capsule (0.4 mg) by mouth daily    Prostatitis, unspecified prostatitis type       UNABLE TO FIND      MEDICATION NAME: topical steroid        ZYRTEC ALLERGY PO      Take by mouth daily

## 2018-02-20 NOTE — NURSING NOTE
Slowing of flow around thanksgiving and dark urine around christmas.  Pt passed a huge clot day before christmas.  Pt had uti and was on abx and pt felt better.  Pt didn't tolerated bactrim well.  Pt has feels like he is sitting on a bike seat all the time.  No gross hematuria or dysuria.   zed=[id number]  D. Phong, CMA

## 2018-02-20 NOTE — PATIENT INSTRUCTIONS
Ibuprofen 600mg 3-4 times daily 5 days (check with Primary Care Provider)  Tamsulosin 0.4mg daily x 30 days  Cipro 500mg two times daily x 4 weeks

## 2018-02-20 NOTE — PROGRESS NOTES
Chief Complaint:    Dysuria, prostatitis           Consult or Referral:     Mr. Greg Tripp is a 37 year old male seen in consultation from Dr. Gillespie.         History of Present Illness:    Greg Tripp is a very pleasant 37 year old male who presents with a history of dysuria and LUTS. Urinary frequency and hematuria with feeling he needed to double/void. Passed some bloody mucous and found to have UTI. Symptoms improved on Cefdinir. Subsequently tried Bactrim but didn't tolerate that well and symptoms got worse. Tried more cefdinir and symptoms improved again, but came back again once he stopped medication. Notes hesitation with voiding. Dysuria. Urinary urgency/freuquency. States he feels like he's sitting on a bike seat. Has been on 1 week of Cipro. Previous antibiotic courses have been 2 weeks or less.         Past Medical History:     Past Medical History:   Diagnosis Date     NO ACTIVE PROBLEMS allergies          Past Surgical History:     Past Surgical History:   Procedure Laterality Date     C APPENDECTOMY  1/04     SURGICAL HISTORY OF -   7/03    right knee arthoscopy          Medications     Current Outpatient Prescriptions   Medication     lisinopril-hydrochlorothiazide (PRINZIDE/ZESTORETIC) 20-12.5 MG per tablet     Cetirizine HCl (ZYRTEC ALLERGY PO)     ciprofloxacin (CIPRO) 500 MG tablet     cefdinir (OMNICEF) 300 MG capsule     sulfamethoxazole-trimethoprim (BACTRIM DS/SEPTRA DS) 800-160 MG per tablet     UNABLE TO FIND     albuterol (ALBUTEROL) 108 (90 BASE) MCG/ACT inhaler     No current facility-administered medications for this visit.           Family History:     Family History   Problem Relation Age of Onset     Depression Paternal Grandmother      Hypertension Father      Family History Negative Mother    Maternal grandfather with prostate cancer.         Social History:     Social History     Social History     Marital status:      Spouse name: N/A     Number of  "children: N/A     Years of education: N/A     Occupational History     .      [employer]     Social History Main Topics     Smoking status: Never Smoker     Smokeless tobacco: Never Used     Alcohol use 1.0 oz/week      Comment: occ.     Drug use: No     Sexual activity: No     Other Topics Concern     Exercise Yes     Seat Belt Yes     Self-Exams Yes     Parent/Sibling W/ Cabg, Mi Or Angioplasty Before 65f 55m? No     Social History Narrative   Works as          Allergies:   Penicillins         Review of Systems:  From intake questionnaire     Negative 14 system review except as noted on HPI, nurse's note.         Physical Exam:     Patient is a 37 year old  male   Vitals: Pulse 66, height 1.753 m (5' 9\"), weight 78 kg (172 lb), SpO2 99 %.  General Appearance Adult: Body mass index is 25.4 kg/(m^2).  Alert, no acute distress, oriented  HENT: throat/mouth:normal, good dentition  Neck: No adenopathy,masses or thyromegaly  Lungs: no respiratory distress, or pursed lip breathing  Heart: No obvious jugular venous distension present  Abdomen: soft, nontender, no organomegaly or masses,   Lymphatics: No inguinal adenopathy  Musculoskeltal: extremities normal, no peripheral edema  Skin: no suspicious lesions or rashes  Neuro: Alert, oriented, speech and mentation normal  Psych: affect and mood normal  Gait: Normal  : penis, scrotum, testes normal,   J CARLOS anodular, symmetric - ~30 cc    PVR = 201 ml      Labs and Pathology:    I reviewed all applicable laboratory and pathology data and went over findings with patient  Significant for   Lab Results   Component Value Date    CR 0.99 02/03/2017    CR 1.20 02/05/2016    CR 0.95 03/31/2015    CR 1.09 01/06/2015    CR 0.94 12/31/2014          Assessment and Plan:     Assessment: 37 year old male with symptoms of prostatitis. Also with rather significant LUTS and elevated PVR today. Discussed that his overall symptom profile is consistent with prostatitis, and he " has yet to receive antibiotic course of adequate duration. As such, will extend his cipro course for another 4 weeks. Will also add NSAIDs and tamsulosin for symptoms. He will do this as below and follow-up in 6 weeks for symptom review.    Plan:  cipro x 4 weeks  Tamsulosin 0.4mg daily  NSAIDs x 5 days  Follow-up 6 weeks with symptom review    Orders  Orders Placed This Encounter   Procedures     UA without Microscopic     Bladder scan     Antione Hazel MD  Urology  [facility]  Clinic Phone [phone]      Answers for HPI/ROS submitted by the patient on 2/15/2018   General Symptoms: No  Skin Symptoms: Yes  HENT Symptoms: Yes  EYE SYMPTOMS: No  HEART SYMPTOMS: No  LUNG SYMPTOMS: No  INTESTINAL SYMPTOMS: Yes  URINARY SYMPTOMS: Yes  REPRODUCTIVE SYMPTOMS: No  SKELETAL SYMPTOMS: No  BLOOD SYMPTOMS: No  NERVOUS SYSTEM SYMPTOMS: No  MENTAL HEALTH SYMPTOMS: No  Changes in hair: No  Changes in moles/birth marks: No  Itching: Yes  Rashes: Yes  Changes in nails: No  Acne: No  Change in facial hair: No  Warts: No  Non-healing sores: No  Scarring: No  Flaking of skin: No  Color changes of hands/feet in cold : No  Sun sensitivity: No  Skin thickening: No  Ear pain: No  Ear discharge: No  Hearing loss: No  Tinnitus: No  Nosebleeds: No  Congestion: Yes  Sinus pain: No  Trouble swallowing: No   Voice hoarseness: No  Mouth sores: No  Sore throat: No  Tooth pain: No  Gum tenderness: No  Bleeding gums: No  Change in taste: No  Change in sense of smell: No  Dry mouth: No  Hearing aid used: No  Neck lump: No  Heart burn or indigestion: Yes  Nausea: No  Vomiting: No  Abdominal pain: No  Bloating: No  Constipation: Yes  Diarrhea: No  Blood in stool: No  Black stools: No  Rectal or Anal pain: No  Fecal incontinence: No  Yellowing of skin or eyes: No  Vomit with blood: No  Change in stools: No  Trouble holding urine or incontinence: No  Pain or burning: Yes  Trouble starting or stopping: Yes  Increased frequency  of urination: Yes  Blood in urine: No  Decreased frequency of urination: No  Frequent nighttime urination: No  Flank pain: No  Difficulty emptying bladder: Yes

## 2018-02-20 NOTE — LETTER
2/20/2018       RE: Greg Tripp  [address] [place] [address]     Dear Colleague,    Thank you for referring your patient, Greg Tripp, to the [facility]. Please see a copy of my visit note below.          Chief Complaint:    Dysuria, prostatitis           Consult or Referral:     Mr. Greg Tripp is a 37 year old male seen in consultation from Dr. Gillespie.         History of Present Illness:    Greg Tripp is a very pleasant 37 year old male who presents with a history of dysuria and LUTS. Urinary frequency and hematuria with feeling he needed to double/void. Passed some bloody mucous and found to have UTI. Symptoms improved on Cefdinir. Subsequently tried Bactrim but didn't tolerate that well and symptoms got worse. Tried more cefdinir and symptoms improved again, but came back again once he stopped medication. Notes hesitation with voiding. Dysuria. Urinary urgency/freuquency. States he feels like he's sitting on a bike seat. Has been on 1 week of Cipro. Previous antibiotic courses have been 2 weeks or less.         Past Medical History:     Past Medical History:   Diagnosis Date     NO ACTIVE PROBLEMS allergies          Past Surgical History:     Past Surgical History:   Procedure Laterality Date     C APPENDECTOMY  1/04     SURGICAL HISTORY OF -   7/03    right knee arthoscopy          Medications     Current Outpatient Prescriptions   Medication     lisinopril-hydrochlorothiazide (PRINZIDE/ZESTORETIC) 20-12.5 MG per tablet     Cetirizine HCl (ZYRTEC ALLERGY PO)     ciprofloxacin (CIPRO) 500 MG tablet     cefdinir (OMNICEF) 300 MG capsule     sulfamethoxazole-trimethoprim (BACTRIM DS/SEPTRA DS) 800-160 MG per tablet     UNABLE TO FIND     albuterol (ALBUTEROL) 108 (90 BASE) MCG/ACT inhaler     No current facility-administered medications for this visit.            "Family History:     Family History   Problem Relation Age of Onset     Depression Paternal Grandmother      Hypertension Father      Family History Negative Mother    Maternal grandfather with prostate cancer.         Social History:     Social History     Social History     Marital status:      Spouse name: N/A     Number of children: N/A     Years of education: N/A     Occupational History     .      [employer]     Social History Main Topics     Smoking status: Never Smoker     Smokeless tobacco: Never Used     Alcohol use 1.0 oz/week      Comment: occ.     Drug use: No     Sexual activity: No     Other Topics Concern     Exercise Yes     Seat Belt Yes     Self-Exams Yes     Parent/Sibling W/ Cabg, Mi Or Angioplasty Before 65f 55m? No     Social History Narrative   Works as          Allergies:   Penicillins         Review of Systems:  From intake questionnaire     Negative 14 system review except as noted on HPI, nurse's note.         Physical Exam:     Patient is a 37 year old  male   Vitals: Pulse 66, height 1.753 m (5' 9\"), weight 78 kg (172 lb), SpO2 99 %.  General Appearance Adult: Body mass index is 25.4 kg/(m^2).  Alert, no acute distress, oriented  HENT: throat/mouth:normal, good dentition  Neck: No adenopathy,masses or thyromegaly  Lungs: no respiratory distress, or pursed lip breathing  Heart: No obvious jugular venous distension present  Abdomen: soft, nontender, no organomegaly or masses,   Lymphatics: No inguinal adenopathy  Musculoskeltal: extremities normal, no peripheral edema  Skin: no suspicious lesions or rashes  Neuro: Alert, oriented, speech and mentation normal  Psych: affect and mood normal  Gait: Normal  : penis, scrotum, testes normal,   J CARLOS anodular, symmetric - ~30 cc    PVR = 201 ml      Labs and Pathology:    I reviewed all applicable laboratory and pathology data and went over findings with patient  Significant for   Lab Results   Component Value Date    CR " 0.99 02/03/2017    CR 1.20 02/05/2016    CR 0.95 03/31/2015    CR 1.09 01/06/2015    CR 0.94 12/31/2014          Assessment and Plan:     Assessment: 37 year old male with symptoms of prostatitis. Also with rather significant LUTS and elevated PVR today. Discussed that his overall symptom profile is consistent with prostatitis, and he has yet to receive antibiotic course of adequate duration. As such, will extend his cipro course for another 4 weeks. Will also add NSAIDs and tamsulosin for symptoms. He will do this as below and follow-up in 6 weeks for symptom review.    Plan:  cipro x 4 weeks  Tamsulosin 0.4mg daily  NSAIDs x 5 days  Follow-up 6 weeks with symptom review    Orders  Orders Placed This Encounter   Procedures     UA without Microscopic     Bladder scan     Antione Hazel MD  Urology  [facility]  Clinic Phone [phone]

## 2018-02-27 ENCOUNTER — OFFICE VISIT (OUTPATIENT)
Dept: FAMILY MEDICINE | Facility: CLINIC | Age: 38
End: 2018-02-27
Payer: COMMERCIAL

## 2018-02-27 VITALS
SYSTOLIC BLOOD PRESSURE: 115 MMHG | TEMPERATURE: 98.4 F | OXYGEN SATURATION: 99 % | RESPIRATION RATE: 18 BRPM | HEIGHT: 69 IN | WEIGHT: 177 LBS | DIASTOLIC BLOOD PRESSURE: 82 MMHG | BODY MASS INDEX: 26.22 KG/M2 | HEART RATE: 61 BPM

## 2018-02-27 DIAGNOSIS — Z00.00 ROUTINE GENERAL MEDICAL EXAMINATION AT A HEALTH CARE FACILITY: Primary | ICD-10-CM

## 2018-02-27 DIAGNOSIS — Z23 ENCOUNTER FOR IMMUNIZATION: ICD-10-CM

## 2018-02-27 DIAGNOSIS — J45.990 EXERCISE-INDUCED BRONCHOSPASM: ICD-10-CM

## 2018-02-27 DIAGNOSIS — I10 ESSENTIAL HYPERTENSION: ICD-10-CM

## 2018-02-27 LAB
ANION GAP SERPL CALCULATED.3IONS-SCNC: 5 MMOL/L (ref 3–14)
BUN SERPL-MCNC: 15 MG/DL (ref 7–30)
CALCIUM SERPL-MCNC: 9.1 MG/DL (ref 8.5–10.1)
CHLORIDE SERPL-SCNC: 102 MMOL/L (ref 94–109)
CO2 SERPL-SCNC: 27 MMOL/L (ref 20–32)
CREAT SERPL-MCNC: 0.92 MG/DL (ref 0.66–1.25)
GFR SERPL CREATININE-BSD FRML MDRD: >90 ML/MIN/1.7M2
GLUCOSE SERPL-MCNC: 87 MG/DL (ref 70–99)
POTASSIUM SERPL-SCNC: 4.8 MMOL/L (ref 3.4–5.3)
SODIUM SERPL-SCNC: 134 MMOL/L (ref 133–144)

## 2018-02-27 PROCEDURE — 90715 TDAP VACCINE 7 YRS/> IM: CPT | Performed by: FAMILY MEDICINE

## 2018-02-27 PROCEDURE — 99395 PREV VISIT EST AGE 18-39: CPT | Performed by: FAMILY MEDICINE

## 2018-02-27 PROCEDURE — 36415 COLL VENOUS BLD VENIPUNCTURE: CPT | Performed by: FAMILY MEDICINE

## 2018-02-27 PROCEDURE — 80048 BASIC METABOLIC PNL TOTAL CA: CPT | Performed by: FAMILY MEDICINE

## 2018-02-27 RX ORDER — ALBUTEROL SULFATE 90 UG/1
2 AEROSOL, METERED RESPIRATORY (INHALATION) 4 TIMES DAILY PRN
Qty: 1 INHALER | Refills: 11 | Status: SHIPPED | OUTPATIENT
Start: 2018-02-27 | End: 2018-03-09

## 2018-02-27 RX ORDER — LISINOPRIL AND HYDROCHLOROTHIAZIDE 12.5; 2 MG/1; MG/1
1 TABLET ORAL DAILY
Qty: 90 TABLET | Refills: 3 | Status: SHIPPED | OUTPATIENT
Start: 2018-02-27 | End: 2019-03-12

## 2018-02-27 NOTE — MR AVS SNAPSHOT
After Visit Summary   2/27/2018    Greg Tripp    MRN: [id number]           Patient Information     Date Of Birth          1980        Visit Information        Provider Department      2/27/2018 11:20 AM Eloy Gillespie MD [facility]        Today's Diagnoses     Routine general medical examination at a health care facility    -  1    Mild intermittent asthma, uncomplicated        Exercise-induced bronchospasm        Essential hypertension        Encounter for immunization          Care Instructions      Preventive Health Recommendations  Male Ages 26 - 39    Yearly exam:             See your health care provider every year in order to  o   Review health changes.   o   Discuss preventive care.    o   Review your medicines if your doctor has prescribed any.    You should be tested each year for STDs (sexually transmitted diseases), if you re at risk.     After age 35, talk to your provider about cholesterol testing. If you are at risk for heart disease, have your cholesterol tested at least every 5 years.     If you are at risk for diabetes, you should have a diabetes test (fasting glucose).  Shots: Get a flu shot each year. Get a tetanus shot every 10 years.     Nutrition:    Eat at least 5 servings of fruits and vegetables daily.     Eat whole-grain bread, whole-wheat pasta and brown rice instead of white grains and rice.     Talk to your provider about Calcium and Vitamin D.     Lifestyle    Exercise for at least 150 minutes a week (30 minutes a day, 5 days a week). This will help you control your weight and prevent disease.     Limit alcohol to one drink per day.     No smoking.     Wear sunscreen to prevent skin cancer.     See your dentist every six months for an exam and cleaning.             Follow-ups after your visit        Follow-up notes from your care team     Return in about 1 year (around 2/27/2019) for Physical Exam.      Your next 10 appointments  "already scheduled     Apr 10, 2018 10:30 AM CDT   Return Visit with Antione Hazel MD   [facility] [place] ([facility])    [address] [facility]  [address]  [facility] [address] [phone]              Who to contact     If you have questions or need follow up information about today's clinic visit or your schedule please contact [facility] directly at [phone].  Normal or non-critical lab and imaging results will be communicated to you by [employer], letter or phone within 4 business days after the clinic has received the results. If you do not hear from us within 7 days, please contact the clinic through [employer] or phone. If you have a critical or abnormal lab result, we will notify you by phone as soon as possible.  Submit refill requests through [employer] or call your pharmacy and they will forward the refill request to us. Please allow 3 business days for your refill to be completed.          Additional Information About Your Visit        [employer] Information     [employer] gives you secure access to your electronic health record. If you see a primary care provider, you can also send messages to your care team and make appointments. If you have questions, please call your primary care clinic.  If you do not have a primary care provider, please call [phone] and they will assist you.        Care EveryWhere ID     This is your Care EveryWhere ID. This could be used by other organizations to access your [place] medical records  [id number]        Your Vitals Were     Pulse Temperature Respirations Height Pulse Oximetry BMI (Body Mass Index)    61 98.4  F (36.9  C) (Oral) 18 5' 9\" (1.753 m) 99% 26.14 kg/m2       Blood Pressure from Last 3 Encounters:   02/27/18 115/82   01/26/18 116/75   12/26/17 115/69    Weight from Last 3 Encounters:   02/27/18 177 lb (80.3 kg)   02/20/18 172 lb (78 kg)   01/26/18 172 lb 8 oz (78.2 " kg)              We Performed the Following     Basic metabolic panel     TDAP VACCINE (ADACEL)          Where to get your medicines      These medications were sent to [id number] - [place], [place] - [address] AT [facility]  [address], [place] [address]    Hours:  24-hours Phone:  [phone]     albuterol 108 (90 BASE) MCG/ACT Inhaler    lisinopril-hydrochlorothiazide 20-12.5 MG per tablet          Primary Care Provider Office Phone # Fax #    Eloy Gillespie [phone] [address]  [facility] [address]        Equal Access to Services     ELIZABETH DIAS : Hadii lyn garg hademmyo Sogene, waaxda luqadaha, qaybta kaalmada adeegyada, julianna palacios. So [facility] [phone].    ATENCIÓN: Si habla español, tiene a villela disposición servicios gratuitos de asistencia lingüística. [facility] [phone].    We comply with applicable federal civil rights laws and [place] laws. We do not discriminate on the basis of race, color, national origin, age, disability, sex, sexual orientation, or gender identity.            Thank you!     Thank you for choosing [facility]  for your care. Our goal is always to provide you with excellent care. Hearing back from our patients is one way we can continue to improve our services. Please take a few minutes to complete the written survey that you may receive in the mail after your visit with us. Thank you!             Your Updated Medication List - Protect others around you: Learn how to safely use, store and throw away your medicines at www.disposemymeds.org.          This list is accurate as of 2/27/18 12:00 PM.  Always use your most recent med list.                   Brand Name Dispense Instructions for use Diagnosis    albuterol 108 (90 BASE) MCG/ACT Inhaler    PROAIR HFA    1 Inhaler    Inhale 2 puffs into the lungs 4 times daily as needed for shortness of breath /  dyspnea or wheezing    Mild intermittent asthma, uncomplicated       ciprofloxacin 500 MG tablet    CIPRO    56 tablet    Take 1 tablet (500 mg) by mouth 2 times daily for 28 days    Prostatitis, unspecified prostatitis type       lisinopril-hydrochlorothiazide 20-12.5 MG per tablet    PRINZIDE/ZESTORETIC    90 tablet    Take 1 tablet by mouth daily    Essential hypertension       tamsulosin 0.4 MG capsule    FLOMAX    30 capsule    Take 1 capsule (0.4 mg) by mouth daily    Prostatitis, unspecified prostatitis type       UNABLE TO FIND      MEDICATION NAME: topical steroid        ZYRTEC ALLERGY PO      Take by mouth daily

## 2018-02-27 NOTE — NURSING NOTE
"Chief Complaint   Patient presents with     Physical       Initial /82 (BP Location: Left arm, Patient Position: Sitting, Cuff Size: Adult Regular)  Pulse 61  Temp 98.4  F (36.9  C) (Oral)  Resp 18  Ht 5' 9\" (1.753 m)  Wt 177 lb (80.3 kg)  SpO2 99%  BMI 26.14 kg/m2 Estimated body mass index is 26.14 kg/(m^2) as calculated from the following:    Height as of this encounter: 5' 9\" (1.753 m).    Weight as of this encounter: 177 lb (80.3 kg).  Medication Reconciliation: Oswaldo Moreno MA    Prior to injection verified patient identity using patient's name and date of birth.  Screening Questionnaire for Adult Immunization     Are you sick today?   No    Do you have allergies to medications, food or any vaccine?   pcn    Have you ever had a serious reaction after receiving a vaccination?   No    Do you have a long-term health problem with heart disease, lung disease,  asthma, kidney disease, diabetes, anemia, metabolic or blood disease?   No    Do you have cancer, leukemia, AIDS, or any immune system problem?   No    Do you take cortisone, prednisone, other steroids, or anticancer drugs, or  have you had any x-ray (radiation) treatments?   No    Have you had a seizure, brain, or other nervous system problem?   No    During the past year, have you received a transfusion of blood or blood       products, or been given a medicine called immune (gamma) globulin?   No    For women: Are you pregnant or is there a chance you could become         pregnant during the next month?   No    Have you received any vaccinations in the past 4 weeks?   No     Immunization questionnaire answers were all negative.      MNVFC doesn't apply on this patient     Screening performed by Stuart Contreras on 2/27/2018 at 12:05 PM.  Per orders of dl , injection(s) of tdap given by Stuart Contreras. Patient instructed to remain in clinic for 20 minutes afterwards, and to report any adverse reaction to me immediately.      "

## 2018-02-27 NOTE — LETTER
My Asthma Action Plan  Name: Greg Tripp   YOB: 1980  Date: 2/27/2018   My doctor: Eloy Gillespie MD   My clinic: [facility]        My Control Medicine: albuterol (ALBUTEROL) 108 (90 BASE) MCG/ACT inhaler  My Rescue Medicine: none listed    My Asthma Severity: intermittent                 GREEN ZONE   Good Control    I feel good    No cough or wheeze    Can work, sleep and play without asthma symptoms       Take your asthma control medicine every day.     1. If exercise triggers your asthma, take your rescue medication    15 minutes before exercise or sports, and    During exercise if you have asthma symptoms  2. Spacer to use with inhaler: If you have a spacer, make sure to use it with your inhaler             YELLOW ZONE Getting Worse  I have ANY of these:    I do not feel good    Cough or wheeze    Chest feels tight    Wake up at night   1. Keep taking your Green Zone medications  2. Start taking your rescue medicine:    every 20 minutes for up to 1 hour. Then every 4 hours for 24-48 hours.  3. If you stay in the Yellow Zone for more than 12-24 hours, contact your doctor.  4. If you do not return to the Green Zone in 12-24 hours or you get worse, start taking your oral steroid medicine if prescribed by your provider.           RED ZONE Medical Alert - Get Help  I have ANY of these:    I feel awful    Medicine is not helping    Breathing getting harder    Trouble walking or talking    Nose opens wide to breathe       1. Take your rescue medicine NOW  2. If your provider has prescribed an oral steroid medicine, start taking it NOW  3. Call your doctor NOW  4. If you are still in the Red Zone after 20 minutes and you have not reached your doctor:    Take your rescue medicine again and    Call 911 or go to the emergency room right away    See your regular doctor within 2 weeks of an Emergency Room or Urgent Care visit for follow-up treatment.        Electronically  signed by: Oswaldo Melara, February 27, 2018    Annual Reminders:  Meet with Asthma Educator,  Flu Shot in the Fall, consider Pneumonia Vaccination for patients with asthma (aged 19 and older).    Pharmacy:    [facility] - [place], [place] - [address]  [id number] - [place], [place] - [address] AT [facility] [address]  [facility] [address] [facility] [address] AT [facility] OF [address]                    Asthma Triggers  How To Control Things That Make Your Asthma Worse    Triggers are things that make your asthma worse.  Look at the list below to help you find your triggers and what you can do about them.  You can help prevent asthma flare-ups by staying away from your triggers.      Trigger                                                          What you can do   Cigarette Smoke  Tobacco smoke can make asthma worse. Do not allow smoking in your home, car or around you.  Be sure no one smokes at a child s day care or school.  If you smoke, ask your health care provider for ways to help you quit.  Ask family members to quit too.  Ask your health care provider for a referral to Quit Plan to help you quit smoking, or call 1-270-170-PLAN.     Colds, Flu, Bronchitis  These are common triggers of asthma. Wash your hands often.  Don t touch your eyes, nose or mouth.  Get a flu shot every year.     Dust Mites  These are tiny bugs that live in cloth or carpet. They are too small to see. Wash sheets and blankets in hot water every week.   Encase pillows and mattress in dust mite proof covers.  Avoid having carpet if you can. If you have carpet, vacuum weekly.   Use a dust mask and HEPA vacuum.   Pollen and Outdoor Mold  Some people are allergic to trees, grass, or weed pollen, or molds. Try to keep your windows closed.  Limit time out doors when pollen count is high.   Ask you health care provider about taking medicine during allergy season.     Animal  Dander  Some people are allergic to skin flakes, urine or saliva from pets with fur or feathers. Keep pets with fur or feathers out of your home.    If you can t keep the pet outdoors, then keep the pet out of your bedroom.  Keep the bedroom door closed.  Keep pets off cloth furniture and away from stuffed toys.     Mice, Rats, and Cockroaches  Some people are allergic to the waste from these pests.   Cover food and garbage.  Clean up spills and food crumbs.  Store grease in the refrigerator.   Keep food out of the bedroom.   Indoor Mold  This can be a trigger if your home has high moisture. Fix leaking faucets, pipes, or other sources of water.   Clean moldy surfaces.  Dehumidify basement if it is damp and smelly.   Smoke, Strong Odors, and Sprays  These can reduce air quality. Stay away from strong odors and sprays, such as perfume, powder, hair spray, paints, smoke incense, paint, cleaning products, candles and new carpet.   Exercise or Sports  Some people with asthma have this trigger. Be active!  Ask your doctor about taking medicine before sports or exercise to prevent symptoms.    Warm up for 5-10 minutes before and after sports or exercise.     Other Triggers of Asthma  Cold air:  Cover your nose and mouth with a scarf.  Sometimes laughing or crying can be a trigger.  Some medicines and food can trigger asthma.

## 2018-02-27 NOTE — PROGRESS NOTES
SUBJECTIVE:   CC: Greg Tripp is an 37 year old male who presents for preventative health visit.     Physical   Annual:     Getting at least 3 servings of Calcium per day::  Yes    Bi-annual eye exam::  Yes    Dental care twice a year::  Yes    Sleep apnea or symptoms of sleep apnea::  None    Diet::  Low salt    Frequency of exercise::  2-3 days/week    Duration of exercise::  45-60 minutes    Taking medications regularly::  Yes    Medication side effects::  None    Additional concerns today::  YES              -------------------------------------    Today's PHQ-2 Score:   PHQ-2 ( 1999 Pfizer) 2/21/2018   Q1: Little interest or pleasure in doing things 0   Q2: Feeling down, depressed or hopeless 0   PHQ-2 Score 0   Q1: Little interest or pleasure in doing things Not at all   Q2: Feeling down, depressed or hopeless Not at all   PHQ-2 Score 0       Abuse: Current or Past(Physical, Sexual or Emotional)- No  Do you feel safe in your environment - Yes    Social History   Substance Use Topics     Smoking status: Never Smoker     Smokeless tobacco: Never Used     Alcohol use 1.0 oz/week      Comment: occ.     No flowsheet data found.No flowsheet data found.    Last PSA: No results found for: PSA    Reviewed orders with patient. Reviewed health maintenance and updated orders accordingly - Yes  Labs reviewed in EPIC    Reviewed and updated as needed this visit by clinical staff         Reviewed and updated as needed this visit by Provider            Review of Systems he has been having urinary ,symptoms diagnosed as prostatitis recently.   C: NEGATIVE for fever, chills, change in weight  I: NEGATIVE for worrisome rashes, moles or lesions  E: NEGATIVE for vision changes or irritation  ENT: NEGATIVE for ear, mouth and throat problems  R: NEGATIVE for significant cough or SOB  CV: NEGATIVE for chest pain, palpitations or peripheral edema  GI: NEGATIVE for nausea, abdominal pain, heartburn, or change in bowel  "habits  M: NEGATIVE for significant arthralgias or myalgia  N: NEGATIVE for weakness, dizziness or paresthesias  P: NEGATIVE for changes in mood or affect    OBJECTIVE:   There were no vitals taken for this visit.    Physical Exam  GENERAL: healthy, alert and no distress  EYES: Eyes grossly normal to inspection, PERRL and conjunctivae and sclerae normal  HENT: ear canals and TM's normal, nose and mouth without ulcers or lesions  NECK: no adenopathy, no asymmetry, masses, or scars and thyroid normal to palpation  RESP: lungs clear to auscultation - no rales, rhonchi or wheezes  CV: regular rate and rhythm, normal S1 S2, no S3 or S4, no murmur, click or rub, no peripheral edema and peripheral pulses strong  ABDOMEN: soft, nontender, no hepatosplenomegaly, no masses and bowel sounds normal  MS: no gross musculoskeletal defects noted, no edema  SKIN: no suspicious lesions or rashes  NEURO: Normal strength and tone, mentation intact and speech normal  PSYCH: mentation appears normal, affect normal/bright    ASSESSMENT/PLAN:       ICD-10-CM    1. Routine general medical examination at a health care facility Z00.00 Basic metabolic panel   2. Exercise-induced bronchospasm J45.990    3. Essential hypertension I10 Basic metabolic panel     lisinopril-hydrochlorothiazide (PRINZIDE/ZESTORETIC) 20-12.5 MG per tablet   4. Encounter for immunization Z23 TDAP VACCINE (ADACEL)   htn at goal     COUNSELING:   Reviewed preventive health counseling, as reflected in patient instructions       Regular exercise       Healthy diet/nutrition     reports that he has never smoked. He has never used smokeless tobacco.    Estimated body mass index is 26.14 kg/(m^2) as calculated from the following:    Height as of this encounter: 5' 9\" (1.753 m).    Weight as of this encounter: 177 lb (80.3 kg).   Weight management plan: Discussed healthy diet and exercise guidelines and patient will follow up in 12 months in clinic to " re-evaluate.    Counseling Resources:  ATP IV Guidelines  Pooled Cohorts Equation Calculator  FRAX Risk Assessment  ICSI Preventive Guidelines  Dietary Guidelines for Americans, 2010  [employer]'s MyPlate  ASA Prophylaxis  Lung CA Screening    Eloy Gillespie MD  [facility]  Answers for HPI/ROS submitted by the patient on 2/21/2018   PHQ-2 Score: 0

## 2018-02-28 ASSESSMENT — ASTHMA QUESTIONNAIRES: ACT_TOTALSCORE: 25

## 2018-03-04 ENCOUNTER — MYC MEDICAL ADVICE (OUTPATIENT)
Dept: FAMILY MEDICINE | Facility: CLINIC | Age: 38
End: 2018-03-04

## 2018-03-04 DIAGNOSIS — J45.990 EXERCISE-INDUCED BRONCHOSPASM: Primary | ICD-10-CM

## 2018-03-05 RX ORDER — ALBUTEROL SULFATE 90 UG/1
2 AEROSOL, METERED RESPIRATORY (INHALATION) EVERY 6 HOURS PRN
Qty: 1 INHALER | Refills: 11 | Status: SHIPPED | OUTPATIENT
Start: 2018-03-05 | End: 2019-03-12

## 2018-03-09 ENCOUNTER — OFFICE VISIT (OUTPATIENT)
Dept: FAMILY MEDICINE | Facility: CLINIC | Age: 38
End: 2018-03-09
Payer: COMMERCIAL

## 2018-03-09 VITALS
DIASTOLIC BLOOD PRESSURE: 82 MMHG | SYSTOLIC BLOOD PRESSURE: 130 MMHG | HEART RATE: 74 BPM | TEMPERATURE: 98.5 F | BODY MASS INDEX: 25.99 KG/M2 | OXYGEN SATURATION: 99 % | RESPIRATION RATE: 18 BRPM | WEIGHT: 176 LBS

## 2018-03-09 DIAGNOSIS — R10.13 DYSPEPSIA AND DISORDER OF FUNCTION OF STOMACH: ICD-10-CM

## 2018-03-09 DIAGNOSIS — R07.89 ATYPICAL CHEST PAIN: Primary | ICD-10-CM

## 2018-03-09 DIAGNOSIS — R14.2 ERUCTATION: ICD-10-CM

## 2018-03-09 DIAGNOSIS — Z87.438 HISTORY OF PROSTATITIS: ICD-10-CM

## 2018-03-09 DIAGNOSIS — I10 ESSENTIAL HYPERTENSION: ICD-10-CM

## 2018-03-09 DIAGNOSIS — K31.9 DYSPEPSIA AND DISORDER OF FUNCTION OF STOMACH: ICD-10-CM

## 2018-03-09 DIAGNOSIS — R53.81 MALAISE: ICD-10-CM

## 2018-03-09 LAB
BASOPHILS # BLD AUTO: 0 10E9/L (ref 0–0.2)
BASOPHILS NFR BLD AUTO: 0.7 %
DIFFERENTIAL METHOD BLD: NORMAL
EOSINOPHIL # BLD AUTO: 0.3 10E9/L (ref 0–0.7)
EOSINOPHIL NFR BLD AUTO: 5.4 %
ERYTHROCYTE [DISTWIDTH] IN BLOOD BY AUTOMATED COUNT: 13 % (ref 10–15)
HCT VFR BLD AUTO: 41.3 % (ref 40–53)
HGB BLD-MCNC: 14.1 G/DL (ref 13.3–17.7)
LIPASE SERPL-CCNC: 158 U/L (ref 73–393)
LYMPHOCYTES # BLD AUTO: 1.5 10E9/L (ref 0.8–5.3)
LYMPHOCYTES NFR BLD AUTO: 26.2 %
MCH RBC QN AUTO: 31.3 PG (ref 26.5–33)
MCHC RBC AUTO-ENTMCNC: 34.1 G/DL (ref 31.5–36.5)
MCV RBC AUTO: 92 FL (ref 78–100)
MONOCYTES # BLD AUTO: 0.5 10E9/L (ref 0–1.3)
MONOCYTES NFR BLD AUTO: 9.4 %
NEUTROPHILS # BLD AUTO: 3.2 10E9/L (ref 1.6–8.3)
NEUTROPHILS NFR BLD AUTO: 58.3 %
PLATELET # BLD AUTO: 248 10E9/L (ref 150–450)
RBC # BLD AUTO: 4.51 10E12/L (ref 4.4–5.9)
WBC # BLD AUTO: 5.5 10E9/L (ref 4–11)

## 2018-03-09 PROCEDURE — 83690 ASSAY OF LIPASE: CPT | Performed by: FAMILY MEDICINE

## 2018-03-09 PROCEDURE — 36415 COLL VENOUS BLD VENIPUNCTURE: CPT | Performed by: FAMILY MEDICINE

## 2018-03-09 PROCEDURE — 80053 COMPREHEN METABOLIC PANEL: CPT | Performed by: FAMILY MEDICINE

## 2018-03-09 PROCEDURE — 85025 COMPLETE CBC W/AUTO DIFF WBC: CPT | Performed by: FAMILY MEDICINE

## 2018-03-09 PROCEDURE — 99214 OFFICE O/P EST MOD 30 MIN: CPT | Performed by: FAMILY MEDICINE

## 2018-03-09 PROCEDURE — 93000 ELECTROCARDIOGRAM COMPLETE: CPT | Performed by: FAMILY MEDICINE

## 2018-03-09 PROCEDURE — 84443 ASSAY THYROID STIM HORMONE: CPT | Performed by: FAMILY MEDICINE

## 2018-03-09 NOTE — PROGRESS NOTES
SUBJECTIVE:   Greg Tripp is a 37 year old male who presents to clinic today for the following health issues:    Discomfort       Onset: 1 week    Description (location/character/radiation/duration): chest    Intensity:  mild    Accompanying signs and symptoms:        Shortness of breath: no        Sweating: no        Nausea/vomiting: no        Palpitations: no        Other (fevers/chills/cough/heartburn/lightheadedness): no     History (similar episodes/previous evaluation): None    Precipitating or alleviating factors:       Worse with exertion: no        Worse with breathing: no        Related to eating: no        Better with burping: YES    Therapies tried and outcome: None    Reports is a pessimistic cynic, too much in his head, has anxiety about symptoms but denies being a hypochondriac, does not usually go to the doctors office often only if there has been an issue.     Hx of exercise induced bronchospasm on albuterol prn, seasonal allergies, HTN on lisinopril hctz, viral warts, prior appy, right knee arthroscopy, allergic to PCN, hx of treated UTI & elevated bili in dec 217, given omnicef, labs , std testing negative, Ucx grew e coli resistant to bactrim, sensitive to Macrobid, seen by urology 2/20/18 with a history of dysuria and LUTS,  Urinary frequency and hematuria with feeling he needed to double/void. Passed some bloody mucous and found to have UTI. Symptoms improved on Cefdinir. Subsequently tried Bactrim but didn't tolerate that well and symptoms got worse. Tried more cefdinir and symptoms improved again, but came back again once he stopped medication. Noted hesitation with voiding. Dysuria. Urinary urgency/frequency. Stated  he feels like he's sitting on a bike seat. Had been on 1 week of Cipro. Previous antibiotic courses had been 2 weeks or less. Had  elevated PVR that day.  Dx with prostatitis, and since he was yet to receive antibiotic course of adequate duration. his Cipro course  was extended for another 4 weeks and advised an NSAID's and tamsulosin for symptoms and follow-up in 6 weeks to urology for symptom review.    Seen by Dr Gillespie PCP for a physical on 2/27/18  & doing well  No LUTS or prostate symptoms as before. Currently On day 24 of 4 week course    &On Flomax     Notes feels  kind of worked up, almost stressed kind of feeling past 2 weeks, since on new meds like Cipro, not sure if its that or something else, feels translated to a feeling of a little heart burn, ? Anxiety/stressed induced heart burn, burning in chest, denies chest pain right now. Feels it back of throat, drinking cool water makes it go away, when burps it gets better, acid reflux like feeling in middle of chest, no debilitating chest pain in chest, no associated light headiness, fainting, sweating, jaw pain , trouble breathing or palpitations or feeling nauseous, but it doesn't stop his brain from going crazy. Has had similar acid indigestion one yr ago and it went away without any change in diet or change in exercise or habits. He's familiar with that specific pain, this is more intense. Burping & taking Tums helps. With being on Cipro needs a two hr window to take Tums. Didn't try gaviscon or zantac yet.. Last yr when it occurred it went away after a couple weeks so thought same would happen this time but didn't occur & here as wants reassurance. Feels if he did  more regular exercise it would pull out his nervous energy. He played soccer on Sunday 5 days ago & had no chest pain or trouble with breathing or wheezing, but got a little lightheaded and only played 3/4 of the game, it then went away and went did another substitution game but the light headiness then came back so then stopped. He feels another reason could be dehydration. Reports Soccer season has been hit or miss to date so has not been as regular in exercise & symptoms could have been from deconditioning or all of the above.    Currently No fever or  chills, no headache or dizziness, ears have some congestion, when moves head quickly may feel balance moving, no sore throat, runny nose, no trouble hearing, smelling, tasting or swallowing, no trouble breathing or palpitations, mild dry cough, with post nasal drip, has had heart burn, reflux, no nausea or vomiting or diarrhea or constipation, bowels regular, occasional loose stools, now regular not too frequent, no blood in stools or black stools, no weight loss or night sweats. No urinary complaints. No pelvic complaints. No leg swelling or rash. Or joint pain.    Hypertension Follow-up      Outpatient blood pressures are not being checked.    Low Salt Diet: no added salt      Problem list and histories reviewed & adjusted, as indicated.  Additional history: as documented    Patient Active Problem List   Diagnosis     Viral warts     Seasonal allergic rhinitis     Hypertension     Exercise-induced bronchospasm     Past Surgical History:   Procedure Laterality Date     C APPENDECTOMY  1/04     SURGICAL HISTORY OF -   7/03    right knee arthoscopy       Social History   Substance Use Topics     Smoking status: Never Smoker     Smokeless tobacco: Never Used     Alcohol use 1.0 oz/week      Comment: occ.     Family History   Problem Relation Age of Onset     Depression Paternal Grandmother      Hypertension Father      Family History Negative Mother          Current Outpatient Prescriptions   Medication Sig Dispense Refill     ranitidine (ZANTAC) 150 MG tablet Take 1 tablet (150 mg) by mouth 2 times daily 60 tablet 0     albuterol (PROAIR HFA/PROVENTIL HFA/VENTOLIN HFA) 108 (90 BASE) MCG/ACT Inhaler Inhale 2 puffs into the lungs every 6 hours as needed for shortness of breath / dyspnea or wheezing 1 Inhaler 11     lisinopril-hydrochlorothiazide (PRINZIDE/ZESTORETIC) 20-12.5 MG per tablet Take 1 tablet by mouth daily 90 tablet 3     ciprofloxacin (CIPRO) 500 MG tablet Take 1 tablet (500 mg) by mouth 2 times daily for  28 days 56 tablet 0     tamsulosin (FLOMAX) 0.4 MG capsule Take 1 capsule (0.4 mg) by mouth daily 30 capsule 1     Cetirizine HCl (ZYRTEC ALLERGY PO) Take by mouth daily       UNABLE TO FIND MEDICATION NAME: topical steroid       Allergies   Allergen Reactions     Penicillins      Recent Labs   Lab Test  03/09/18   1513  02/27/18   1207  02/03/17   0954  02/05/16   1206   12/31/14   1950   LDL   --    --   109*  96   --    --    HDL   --    --   57  69   --    --    TRIG   --    --   64  87   --    --    ALT  29   --   23   --    --    --    CR  1.17  0.92  0.99  1.20   < >  0.94   GFRESTIMATED  69  >90  85  68   < >  >90  Non  GFR Calc     GFRESTBLACK  84  >90  >90   GFR Calc    83   < >  >90   GFR Calc     POTASSIUM  4.6  4.8  4.9  3.9   < >  3.9   TSH  1.44   --    --    --    --   0.75    < > = values in this interval not displayed.      BP Readings from Last 3 Encounters:   03/09/18 130/82   02/27/18 115/82   01/26/18 116/75    Wt Readings from Last 3 Encounters:   03/09/18 176 lb (79.8 kg)   02/27/18 177 lb (80.3 kg)   02/20/18 172 lb (78 kg)                  Labs reviewed in EPIC    Reviewed and updated as needed this visit by clinical staff  Tobacco  Allergies  Meds  Med Hx  Surg Hx  Fam Hx  Soc Hx      Reviewed and updated as needed this visit by Provider         ROS:  Constitutional, HEENT, cardiovascular, pulmonary, GI, , musculoskeletal, neuro, skin, endocrine and psych systems are negative, except as otherwise noted.    OBJECTIVE:     /82  Pulse 74  Temp 98.5  F (36.9  C) (Oral)  Resp 18  Wt 176 lb (79.8 kg)  SpO2 99%  BMI 25.99 kg/m2  Body mass index is 25.99 kg/(m^2).  GENERAL: healthy, alert and no distress, burped once in room  EYES: Eyes grossly normal to inspection, PERRL and conjunctivae and sclerae normal  HENT: ear canals and TM's normal, nose and mouth without ulcers or lesions  NECK: no adenopathy, no asymmetry, masses, or  scars and thyroid normal to palpation  RESP: lungs clear to auscultation - no rales, rhonchi or wheezes  CV: regular rate and rhythm, normal S1 S2, no S3 or S4, no murmur, click or rub, no peripheral edema and peripheral pulses strong  ABDOMEN: soft, non tender, no hepatosplenomegaly, no masses and bowel sounds normal  MS: no gross musculoskeletal defects noted, no edema  SKIN: no suspicious lesions or rashes  NEURO: Normal strength and tone, mentation intact and speech normal  PSYCH: mentation appears normal, affect normal/bright, anxious, speech pressured, judgement and insight intact and appearance well groomed    Diagnostic Test Results:  No results found for this or any previous visit (from the past 24 hour(s)).   EKG & CBC normal     ASSESSMENT/PLAN:     1. Atypical chest pain  EKG normal, but counseled is just a snap shot in time. Since symptoms ongoing two weeks & EKG unremarkable & hx more of upper GI, suspect atypical chest discomfort from indigestion/ dyspepsia/ GERD. Start zantac 150 mg twice a day if that does not work can try Prilosec 20 mg 1/2 hr on empty stomach martinez for 2 weeks. Advised Frequent small meals, avoid spicy , greasy foods, do not eat right before lying down. Will send lab result via my chart Take their with food. Follow up with urology as planned. Advised to eat a probiotic for duration of antibiotics to prevent C diff. And to go to the ER if symptoms worsen despite above  - CBC with platelets differential  - EKG 12-lead complete w/read - Clinics  - ranitidine (ZANTAC) 150 MG tablet; Take 1 tablet (150 mg) by mouth 2 times daily  Dispense: 60 tablet; Refill: 0  - Lipase    2. Eructation  Gaviscon, Tums prn & try zantac  - ranitidine (ZANTAC) 150 MG tablet; Take 1 tablet (150 mg) by mouth 2 times daily  Dispense: 60 tablet; Refill: 0  - Lipase    3. Dyspepsia and disorder of function of stomach  - ranitidine (ZANTAC) 150 MG tablet; Take 1 tablet (150 mg) by mouth 2 times daily  Dispense:  60 tablet; Refill: 0  - Lipase    4. Malaise  Unclear etiology/diagnosis with uncertain prognosis requiring further workup below.  - CBC with platelets differential  - Comprehensive metabolic panel  - TSH with free T4 reflex    5. History of prostatitis  On day 24 of 28 of Cipro under care of urology  - CBC with platelets differential    6. Essential hypertension  stable on meds  - CBC with platelets differential  - TSH with free T4 reflex    See Patient Instructions    Addis Fuentes MD  [facility]

## 2018-03-09 NOTE — PATIENT INSTRUCTIONS
EKG normal, is a snap shot in time  Less likely given going on 1 to 2 weeks and history that is is cardiac, suspect indigestion/ dyspepsia/ GERD  Start zantac 150 mg twice a day if that does not work can try prilosec 20 mg 1/2 hr on empty stomach martinez for 2 weeks   Frequent small meals  Will send lab result with my chart   Take your cipro with food   Follow up with urology   Eat a probiotic so dont get a gut infection ( diarrhea ) from antibiotics  Go to the Er if symptoms worsens despite above

## 2018-03-09 NOTE — MR AVS SNAPSHOT
After Visit Summary   3/9/2018    Greg Tripp    MRN: [id number]           Patient Information     Date Of Birth          1980        Visit Information        Provider Department      3/9/2018 2:20 PM Addis Fuentes MD [facility]        Today's Diagnoses     Atypical chest pain    -  1    Eructation        Dyspepsia and disorder of function of stomach        Malaise        History of prostatitis        Essential hypertension          Care Instructions    EKG normal, is a snap shot in time  Less likely given going on 1 to 2 weeks and history that is is cardiac, suspect indigestion/ dyspepsia/ GERD  Start zantac 150 mg twice a day if that does not work can try prilosec 20 mg 1/2 hr on empty stomach martinez for 2 weeks   Frequent small meals  Will send lab result with my chart   Take your cipro with food   Follow up with urology   Ea a probiotic so dont get a gut infection ( diarrhea ) form antibiotics  Go to the Er if symptoms worsens despite above          Follow-ups after your visit        Your next 10 appointments already scheduled     Apr 24, 2018 11:00 AM CDT   Return Visit with Antione Hazel MD   [facility] [place] ([facility] [place])    [address]  [address]  [facility] [address] [phone]              Who to contact     If you have questions or need follow up information about today's clinic visit or your schedule please contact [facility] directly at [phone].  Normal or non-critical lab and imaging results will be communicated to you by MyChart, letter or phone within 4 business days after the clinic has received the results. If you do not hear from us within 7 days, please contact the clinic through MyChart or phone. If you have a critical or abnormal lab result, we will notify you by phone as soon as possible.  Submit refill requests through MyChart or call your  pharmacy and they will forward the refill request to us. Please allow 3 business days for your refill to be completed.          Additional Information About Your Visit        Synlogichart Information     [employer] gives you secure access to your electronic health record. If you see a primary care provider, you can also send messages to your care team and make appointments. If you have questions, please call your primary care clinic.  If you do not have a primary care provider, please call [phone] and they will assist you.        Care EveryWhere ID     This is your Care EveryWhere ID. This could be used by other organizations to access your [place] medical records  [id number]        Your Vitals Were     Pulse Temperature Respirations Pulse Oximetry BMI (Body Mass Index)       74 98.5  F (36.9  C) (Oral) 18 99% 25.99 kg/m2        Blood Pressure from Last 3 Encounters:   03/09/18 130/82   02/27/18 115/82   01/26/18 116/75    Weight from Last 3 Encounters:   03/09/18 176 lb (79.8 kg)   02/27/18 177 lb (80.3 kg)   02/20/18 172 lb (78 kg)              We Performed the Following     CBC with platelets differential     Comprehensive metabolic panel     EKG 12-lead complete w/read - Clinics     Lipase     TSH with free T4 reflex          Today's Medication Changes          These changes are accurate as of 3/9/18  3:30 PM.  If you have any questions, ask your nurse or doctor.               Start taking these medicines.        Dose/Directions    ranitidine 150 MG tablet   Commonly known as:  ZANTAC   Used for:  Atypical chest pain, Eructation, Dyspepsia and disorder of function of stomach   Started by:  Addis Fuentes MD        Dose:  150 mg   Take 1 tablet (150 mg) by mouth 2 times daily   Quantity:  60 tablet   Refills:  0            Where to get your medicines      These medications were sent to [id number] - [place], [place] - [address] AT [facility] [address], [place]  [address]    Hours:  24-hours Phone:  [phone]     ranitidine 150 MG tablet                Primary Care Provider Office Phone # Fax #    Eloy Gillespie [phone] [address]  [facility] [address]        Equal Access to Services     ELIZABETH DIAS : Hadii aad ku hadasho Soomaali, waaxda luqadaha, qaybta kaalmada adeegyada, waxay idiin hayaan adeeg alia lakimberlyn ah. So [facility] [phone].    ATENCIÓN: Si habla español, tiene a villela disposición servicios gratuitos de asistencia lingüística. [facility] [phone].    We comply with applicable federal civil rights laws and [place] laws. We do not discriminate on the basis of race, color, national origin, age, disability, sex, sexual orientation, or gender identity.            Thank you!     Thank you for choosing [facility]  for your care. Our goal is always to provide you with excellent care. Hearing back from our patients is one way we can continue to improve our services. Please take a few minutes to complete the written survey that you may receive in the mail after your visit with us. Thank you!             Your Updated Medication List - Protect others around you: Learn how to safely use, store and throw away your medicines at www.disposemymeds.org.          This list is accurate as of 3/9/18  3:30 PM.  Always use your most recent med list.                   Brand Name Dispense Instructions for use Diagnosis    albuterol 108 (90 BASE) MCG/ACT Inhaler    PROAIR HFA/PROVENTIL HFA/VENTOLIN HFA    1 Inhaler    Inhale 2 puffs into the lungs every 6 hours as needed for shortness of breath / dyspnea or wheezing    Exercise-induced bronchospasm       ciprofloxacin 500 MG tablet    CIPRO    56 tablet    Take 1 tablet (500 mg) by mouth 2 times daily for 28 days    Prostatitis, unspecified prostatitis type       lisinopril-hydrochlorothiazide 20-12.5 MG per tablet    PRINZIDE/ZESTORETIC    90 tablet    Take 1 tablet by mouth daily     Essential hypertension       ranitidine 150 MG tablet    ZANTAC    60 tablet    Take 1 tablet (150 mg) by mouth 2 times daily    Atypical chest pain, Eructation, Dyspepsia and disorder of function of stomach       tamsulosin 0.4 MG capsule    FLOMAX    30 capsule    Take 1 capsule (0.4 mg) by mouth daily    Prostatitis, unspecified prostatitis type       UNABLE TO FIND      MEDICATION NAME: topical steroid        ZYRTEC ALLERGY PO      Take by mouth daily

## 2018-03-10 LAB
ALBUMIN SERPL-MCNC: 4 G/DL (ref 3.4–5)
ALP SERPL-CCNC: 66 U/L (ref 40–150)
ALT SERPL W P-5'-P-CCNC: 29 U/L (ref 0–70)
ANION GAP SERPL CALCULATED.3IONS-SCNC: 8 MMOL/L (ref 3–14)
AST SERPL W P-5'-P-CCNC: 27 U/L (ref 0–45)
BILIRUB SERPL-MCNC: 0.7 MG/DL (ref 0.2–1.3)
BUN SERPL-MCNC: 16 MG/DL (ref 7–30)
CALCIUM SERPL-MCNC: 9 MG/DL (ref 8.5–10.1)
CHLORIDE SERPL-SCNC: 102 MMOL/L (ref 94–109)
CO2 SERPL-SCNC: 27 MMOL/L (ref 20–32)
CREAT SERPL-MCNC: 1.17 MG/DL (ref 0.66–1.25)
GFR SERPL CREATININE-BSD FRML MDRD: 69 ML/MIN/1.7M2
GLUCOSE SERPL-MCNC: 92 MG/DL (ref 70–99)
POTASSIUM SERPL-SCNC: 4.6 MMOL/L (ref 3.4–5.3)
PROT SERPL-MCNC: 7.5 G/DL (ref 6.8–8.8)
SODIUM SERPL-SCNC: 137 MMOL/L (ref 133–144)
TSH SERPL DL<=0.005 MIU/L-ACNC: 1.44 MU/L (ref 0.4–4)

## 2018-03-10 NOTE — PROGRESS NOTES
Praneeth Mr. Tripp,  Your results came back with a normal lipase ( no pancreatitic inflammation ) If you have any further concerns please do not hesitate to contact us by message, phone or making an appointment.  Have a good day   Dr Alfredo CAO

## 2018-03-11 NOTE — PROGRESS NOTES
Praneeth Mr. Tripp,  Your results came back and are within acceptable limits. -Liver and gallbladder tests are normal. (ALT,AST, Alk phos, bilirubin), kidney function is normal (Cr, GFR), Sodium is normal, Potassium is normal, Calcium is normal, Glucose is normal (diabetes screening test).   -TSH (thyroid stimulating hormone) level is normal which indicates normal thyroid function.. If you have any further concerns please do not hesitate to contact us by message, phone or making an appointment.  Have a good day   Dr Alfredo CAO

## 2018-04-24 ENCOUNTER — OFFICE VISIT (OUTPATIENT)
Dept: UROLOGY | Facility: CLINIC | Age: 38
End: 2018-04-24
Payer: COMMERCIAL

## 2018-04-24 VITALS
SYSTOLIC BLOOD PRESSURE: 114 MMHG | HEART RATE: 72 BPM | BODY MASS INDEX: 26.07 KG/M2 | OXYGEN SATURATION: 98 % | HEIGHT: 69 IN | WEIGHT: 176 LBS | DIASTOLIC BLOOD PRESSURE: 82 MMHG

## 2018-04-24 DIAGNOSIS — N41.9 PROSTATITIS, UNSPECIFIED PROSTATITIS TYPE: Primary | ICD-10-CM

## 2018-04-24 LAB — RESIDUAL VOLUME (RV) (EXTERNAL): 0

## 2018-04-24 PROCEDURE — 99213 OFFICE O/P EST LOW 20 MIN: CPT | Performed by: UROLOGY

## 2018-04-24 ASSESSMENT — PAIN SCALES - GENERAL: PAINLEVEL: NO PAIN (0)

## 2018-04-24 NOTE — NURSING NOTE
"Pt has a butt area cramp he can not get rid of.  No other trouble with voiding.  Sx improved since he saw you last.  Pt denies trouble urinating.  Pt is not on flomax.  Pt had side effect of \"weird ejaculation\".  Side effect stopped after stopping the flomax.  No urinary help from flomax.  INGE Darling, ZAN    "

## 2018-04-24 NOTE — PROGRESS NOTES
"  CHIEF COMPLAINT  It was my pleasure to see Greg Tripp who is a 38 year old male for follow-up of prostatitis.      HPI  Greg Tripp is a very pleasant 38 year old male who presents with a history of prostatitis. Had frequnecy and hematuria with need to double-void and had culture-positive UTI. Symptoms improved on antibiotics. Recently completed course of Cipro, tamsulosin, and ibuprofen. Symptoms now much improved.    PVR was 201ml at last visit    PHYSICAL EXAM  Vitals:    04/24/18 1130   BP: 114/82   Pulse: 72   SpO2: 98%   Weight: 79.8 kg (176 lb)   Height: 1.753 m (5' 9\")     Constitutional: Alert, no acute distress  Psychiatric: Normal mood and affect    ASSESSMENT and PLAN  38 year old male with prostatitis. Now improved. Feels he's emptying much better. PVR 0 today. Overall doing well.    - Follow-up prn    I spent over 15 minutes with the patient.  Over half this time was spent on counseling regarding prostatitis.    Antione Hazel MD  Urology  [facility]  Clinic Phone [phone]        "

## 2018-04-24 NOTE — MR AVS SNAPSHOT
After Visit Summary   4/24/2018    Greg Tripp    MRN: [id number]           Patient Information     Date Of Birth          1980        Visit Information        Provider Department      4/24/2018 11:00 AM Antione Hazel MD [facility]        Today's Diagnoses     Prostatitis, unspecified prostatitis type    -  1       Follow-ups after your visit        Follow-up notes from your care team     Return if symptoms worsen or fail to improve.      Who to contact     If you have questions or need follow up information about today's clinic visit or your schedule please contact [facility] [place] directly at [phone].  Normal or non-critical lab and imaging results will be communicated to you by MyChart, letter or phone within 4 business days after the clinic has received the results. If you do not hear from us within 7 days, please contact the clinic through DNagehart or phone. If you have a critical or abnormal lab result, we will notify you by phone as soon as possible.  Submit refill requests through ReFashioner or call your pharmacy and they will forward the refill request to us. Please allow 3 business days for your refill to be completed.          Additional Information About Your Visit        MyChart Information     [employer] gives you secure access to your electronic health record. If you see a primary care provider, you can also send messages to your care team and make appointments. If you have questions, please call your primary care clinic.  If you do not have a primary care provider, please call [phone] and they will assist you.        Care EveryWhere ID     This is your Care EveryWhere ID. This could be used by other organizations to access your [place] medical records  [id number]        Your Vitals Were     Pulse Height Pulse Oximetry BMI (Body Mass Index)          72 1.753 m (5'  "9\") 98% 25.99 kg/m2         Blood Pressure from Last 3 Encounters:   04/24/18 114/82   03/09/18 130/82   02/27/18 115/82    Weight from Last 3 Encounters:   04/24/18 79.8 kg (176 lb)   03/09/18 79.8 kg (176 lb)   02/27/18 80.3 kg (177 lb)              We Performed the Following     Bladder scan        Primary Care Provider Office Phone # Fax #    Eloy Gillespie [phone] [address]  [facility] [address]        Equal Access to Services     [facility] HospitalEVONNE : Hadii aad ku hadasho Sogene, waaxda luqadaha, qaybta kaalmada adeandrewyaphil, julianna jackson . So [facility] [phone].    ATENCIÓN: Si habla español, tiene a villela disposición servicios gratuitos de asistencia lingüística. [facility] [phone].    We comply with applicable federal civil rights laws and [place] laws. We do not discriminate on the basis of race, color, national origin, age, disability, sex, sexual orientation, or gender identity.            Thank you!     Thank you for choosing [facility] [place]  for your care. Our goal is always to provide you with excellent care. Hearing back from our patients is one way we can continue to improve our services. Please take a few minutes to complete the written survey that you may receive in the mail after your visit with us. Thank you!             Your Updated Medication List - Protect others around you: Learn how to safely use, store and throw away your medicines at www.disposemymeds.org.          This list is accurate as of 4/24/18 12:17 PM.  Always use your most recent med list.                   Brand Name Dispense Instructions for use Diagnosis    albuterol 108 (90 Base) MCG/ACT Inhaler    PROAIR HFA/PROVENTIL HFA/VENTOLIN HFA    1 Inhaler    Inhale 2 puffs into the lungs every 6 hours as needed for shortness of breath / dyspnea or wheezing    Exercise-induced bronchospasm       lisinopril-hydrochlorothiazide 20-12.5 MG per " tablet    PRINZIDE/ZESTORETIC    90 tablet    Take 1 tablet by mouth daily    Essential hypertension       ranitidine 150 MG tablet    ZANTAC    60 tablet    Take 1 tablet (150 mg) by mouth 2 times daily    Atypical chest pain, Eructation, Dyspepsia and disorder of function of stomach       tamsulosin 0.4 MG capsule    FLOMAX    30 capsule    Take 1 capsule (0.4 mg) by mouth daily    Prostatitis, unspecified prostatitis type       UNABLE TO FIND      MEDICATION NAME: topical steroid        ZYRTEC ALLERGY PO      Take by mouth daily

## 2018-05-02 ENCOUNTER — MYC MEDICAL ADVICE (OUTPATIENT)
Dept: FAMILY MEDICINE | Facility: CLINIC | Age: 38
End: 2018-05-02

## 2018-05-02 DIAGNOSIS — J30.2 SEASONAL ALLERGIC RHINITIS, UNSPECIFIED CHRONICITY, UNSPECIFIED TRIGGER: Primary | ICD-10-CM

## 2018-05-02 RX ORDER — FLUTICASONE PROPIONATE 50 MCG
1-2 SPRAY, SUSPENSION (ML) NASAL DAILY
Qty: 1 BOTTLE | Refills: 5 | Status: CANCELLED | OUTPATIENT
Start: 2018-05-02

## 2018-05-02 RX ORDER — FLUTICASONE PROPIONATE 50 MCG
1-2 SPRAY, SUSPENSION (ML) NASAL DAILY
Qty: 1 BOTTLE | Refills: 11 | Status: SHIPPED | OUTPATIENT
Start: 2018-05-02 | End: 2019-06-19

## 2018-05-02 NOTE — TELEPHONE ENCOUNTER
Sent mychart as per below with provider response    Closing encounter - no further actions needed at this time    Ronni Quesada RN

## 2018-06-26 ENCOUNTER — OFFICE VISIT (OUTPATIENT)
Dept: FAMILY MEDICINE | Facility: CLINIC | Age: 38
End: 2018-06-26
Payer: COMMERCIAL

## 2018-06-26 VITALS
RESPIRATION RATE: 23 BRPM | DIASTOLIC BLOOD PRESSURE: 78 MMHG | SYSTOLIC BLOOD PRESSURE: 115 MMHG | OXYGEN SATURATION: 98 % | BODY MASS INDEX: 25.43 KG/M2 | WEIGHT: 172.2 LBS | TEMPERATURE: 99.3 F | HEART RATE: 65 BPM

## 2018-06-26 DIAGNOSIS — N63.0 LUMP OR MASS IN BREAST: Primary | ICD-10-CM

## 2018-06-26 DIAGNOSIS — L20.82 FLEXURAL ECZEMA: ICD-10-CM

## 2018-06-26 DIAGNOSIS — L30.4 INTERTRIGO: ICD-10-CM

## 2018-06-26 PROCEDURE — 99213 OFFICE O/P EST LOW 20 MIN: CPT | Performed by: FAMILY MEDICINE

## 2018-06-26 NOTE — PROGRESS NOTES
SUBJECTIVE:   Greg Tripp is a 38 year old male who presents to clinic today for the following health issues:    1. Vacation in [place] one week ago and noticed in a lump in his right breast, some bruising, subdermal mass,  right breast above nipple, tender but not painful, diet consistent, no animals at home  - no tick/animal bites, no exposures  - Has always had mild gyneocmastia   - Mammo in 2016 was normal    2. Groin itch, unsure when started, thought it was eczema, comes and goes, itchy, tried some antifungal OTC medication with no relief.   - Has mild eczema in antecubital and popliteal  fossa, using cream/moisture.     Lump      Duration: Noticed it last week while on vacation     Description (location/character/radiation): right side of chest     Intensity:  mild    Accompanying signs and symptoms: tender, red, bruising like    Therapies tried and outcome: None       Problem list and histories reviewed & adjusted, as indicated.  Additional history: none    Patient Active Problem List   Diagnosis     Viral warts     Seasonal allergic rhinitis     Hypertension     Exercise-induced bronchospasm     Past Surgical History:   Procedure Laterality Date     C APPENDECTOMY  1/04     SURGICAL HISTORY OF -   7/03    right knee arthoscopy       Social History   Substance Use Topics     Smoking status: Never Smoker     Smokeless tobacco: Never Used     Alcohol use 1.0 oz/week      Comment: occ.     Family History   Problem Relation Age of Onset     Depression Paternal Grandmother      Hypertension Father      Family History Negative Mother          Current Outpatient Prescriptions   Medication Sig Dispense Refill     albuterol (PROAIR HFA/PROVENTIL HFA/VENTOLIN HFA) 108 (90 BASE) MCG/ACT Inhaler Inhale 2 puffs into the lungs every 6 hours as needed for shortness of breath / dyspnea or wheezing 1 Inhaler 11     Cetirizine HCl (ZYRTEC ALLERGY PO) Take by mouth daily       fluticasone (FLONASE) 50 MCG/ACT  spray Spray 1-2 sprays into both nostrils daily 1 Bottle 11     lisinopril-hydrochlorothiazide (PRINZIDE/ZESTORETIC) 20-12.5 MG per tablet Take 1 tablet by mouth daily 90 tablet 3     UNABLE TO FIND MEDICATION NAME: topical steroid       ranitidine (ZANTAC) 150 MG tablet Take 1 tablet (150 mg) by mouth 2 times daily (Patient not taking: Reported on 4/24/2018) 60 tablet 0     tamsulosin (FLOMAX) 0.4 MG capsule Take 1 capsule (0.4 mg) by mouth daily (Patient not taking: Reported on 4/24/2018) 30 capsule 1       Reviewed and updated as needed this visit by clinical staff       Reviewed and updated as needed this visit by Provider         ROS:  Constitutional, HEENT, cardiovascular, pulmonary, gi and gu systems are negative, except as otherwise noted.    OBJECTIVE:     /78 (BP Location: Left arm, Patient Position: Sitting, Cuff Size: Adult Large)  Pulse 65  Temp 99.3  F (37.4  C) (Oral)  Resp 23  Wt 172 lb 3.2 oz (78.1 kg)  SpO2 98%  BMI 25.43 kg/m2  Body mass index is 25.43 kg/(m^2).  GENERAL: healthy, alert and no distress  NECK: no adenopathy, no asymmetry, masses, or scars and thyroid normal to palpation  RESP: lungs clear to auscultation - no rales, rhonchi or wheezes  BREAST: mild gynecomastia bilaterally, firm, non-tender, 1cm, mobile mass, greenish discoloration around the skin superior to the nipple, appears to be healing bruise, no nipple discharge and no palpable axillary masses or adenopathy.   CV: regular rate and rhythm, normal S1 S2, no S3 or S4, no murmur, click or rub, no peripheral edema and peripheral pulses strong  ABDOMEN: soft, nontender, no hepatosplenomegaly, no masses and bowel sounds normal   (male): normal male genitalia without lesions or urethral discharge, no hernia, mild erythema with indistinct borders on the scrotum and skin of thigh, no scaling.   MS: no gross musculoskeletal defects noted, no edema    Diagnostic Test Results:  none     ASSESSMENT/PLAN:       ICD-10-CM     1. Lump or mass in breast N63.0 MA Diagnostic Digital Right   2. Intertrigo L30.4    3. Flexural eczema L20.82    the lump may be traumatic given the bruising. Discussed monitoring for a short time for resolution and to mammo/us if it does not resolve. The groin/scrotal rash seems to be intertigo/contact derm. Use of OTC  meds. Discussed.

## 2018-06-26 NOTE — PATIENT INSTRUCTIONS
If the breast lesion does not resolve over the coming 1-2 weeks then make that appointment with the specialist. To schedule  call [phone] for [facility] scheduling. [phone] at [facility] (or for [facility] cardiology procedure scheduling [phone])

## 2018-06-26 NOTE — MR AVS SNAPSHOT
After Visit Summary   6/26/2018    Greg Tripp    MRN: [id number]           Patient Information     Date Of Birth          1980        Visit Information        Provider Department      6/26/2018 2:00 PM Eloy Gillespie MD [facility]        Today's Diagnoses     Lump or mass in breast    -  1    Intertrigo        Flexural eczema          Care Instructions    If the breast lesion does not resolve over the coming 1-2 weeks then make that appointment with the specialist.               Follow-ups after your visit        Future tests that were ordered for you today     Open Future Orders        Priority Expected Expires Ordered    MA Diagnostic Digital Right Routine  6/26/2019 6/26/2018            Who to contact     If you have questions or need follow up information about today's clinic visit or your schedule please contact [facility] directly at [phone].  Normal or non-critical lab and imaging results will be communicated to you by Sunglasshart, letter or phone within 4 business days after the clinic has received the results. If you do not hear from us within 7 days, please contact the clinic through Sunglasshart or phone. If you have a critical or abnormal lab result, we will notify you by phone as soon as possible.  Submit refill requests through Boingo Wireless or call your pharmacy and they will forward the refill request to us. Please allow 3 business days for your refill to be completed.          Additional Information About Your Visit        Sunglasshart Information     [employer] gives you secure access to your electronic health record. If you see a primary care provider, you can also send messages to your care team and make appointments. If you have questions, please call your primary care clinic.  If you do not have a primary care provider, please call [phone] and they will assist you.        Care EveryWhere ID     This is your Care EveryWhere ID. This  could be used by other organizations to access your [place] medical records  [id number]        Your Vitals Were     Pulse Temperature Respirations Pulse Oximetry BMI (Body Mass Index)       65 99.3  F (37.4  C) (Oral) 23 98% 25.43 kg/m2        Blood Pressure from Last 3 Encounters:   06/26/18 115/78   04/24/18 114/82   03/09/18 130/82    Weight from Last 3 Encounters:   06/26/18 172 lb 3.2 oz (78.1 kg)   04/24/18 176 lb (79.8 kg)   03/09/18 176 lb (79.8 kg)               Primary Care Provider Office Phone # Fax #    Eloy Gonzalo Gillespie [phone] [address] [facility] [address]        Equal Access to Services     ELIZABETH DIAS : Hadii lyn garg hadasho Soomaali, waaxda luqadaha, qaybta kaalmada adeegyada, julianna jackson . So [facility] [phone].    ATENCIÓN: Si habla español, tiene a villela disposición servicios gratuitos de asistencia lingüística. Llame al [phone].    We comply with applicable federal civil rights laws and [place] laws. We do not discriminate on the basis of race, color, national origin, age, disability, sex, sexual orientation, or gender identity.            Thank you!     Thank you for choosing [facility]  for your care. Our goal is always to provide you with excellent care. Hearing back from our patients is one way we can continue to improve our services. Please take a few minutes to complete the written survey that you may receive in the mail after your visit with us. Thank you!             Your Updated Medication List - Protect others around you: Learn how to safely use, store and throw away your medicines at www.disposemymeds.org.          This list is accurate as of 6/26/18  2:51 PM.  Always use your most recent med list.                   Brand Name Dispense Instructions for use Diagnosis    albuterol 108 (90 Base) MCG/ACT Inhaler    PROAIR HFA/PROVENTIL HFA/VENTOLIN HFA    1 Inhaler    Inhale 2 puffs into the lungs every 6  hours as needed for shortness of breath / dyspnea or wheezing    Exercise-induced bronchospasm       fluticasone 50 MCG/ACT spray    FLONASE    1 Bottle    Spray 1-2 sprays into both nostrils daily    Seasonal allergic rhinitis, unspecified chronicity, unspecified trigger       lisinopril-hydrochlorothiazide 20-12.5 MG per tablet    PRINZIDE/ZESTORETIC    90 tablet    Take 1 tablet by mouth daily    Essential hypertension       ranitidine 150 MG tablet    ZANTAC    60 tablet    Take 1 tablet (150 mg) by mouth 2 times daily    Atypical chest pain, Eructation, Dyspepsia and disorder of function of stomach       tamsulosin 0.4 MG capsule    FLOMAX    30 capsule    Take 1 capsule (0.4 mg) by mouth daily    Prostatitis, unspecified prostatitis type       UNABLE TO FIND      MEDICATION NAME: topical steroid        ZYRTEC ALLERGY PO      Take by mouth daily

## 2019-03-12 ENCOUNTER — OFFICE VISIT (OUTPATIENT)
Dept: FAMILY MEDICINE | Facility: CLINIC | Age: 39
End: 2019-03-12
Payer: COMMERCIAL

## 2019-03-12 VITALS
SYSTOLIC BLOOD PRESSURE: 129 MMHG | BODY MASS INDEX: 25.7 KG/M2 | TEMPERATURE: 98.6 F | DIASTOLIC BLOOD PRESSURE: 80 MMHG | RESPIRATION RATE: 16 BRPM | OXYGEN SATURATION: 100 % | HEIGHT: 69 IN | HEART RATE: 67 BPM | WEIGHT: 173.5 LBS

## 2019-03-12 DIAGNOSIS — N63.20 PAINFUL LUMPY LEFT BREAST: ICD-10-CM

## 2019-03-12 DIAGNOSIS — J45.990 EXERCISE-INDUCED BRONCHOSPASM: ICD-10-CM

## 2019-03-12 DIAGNOSIS — I10 ESSENTIAL HYPERTENSION: ICD-10-CM

## 2019-03-12 DIAGNOSIS — N64.4 PAINFUL LUMPY LEFT BREAST: ICD-10-CM

## 2019-03-12 DIAGNOSIS — Z00.00 ROUTINE GENERAL MEDICAL EXAMINATION AT A HEALTH CARE FACILITY: Primary | ICD-10-CM

## 2019-03-12 LAB
ALBUMIN UR-MCNC: NEGATIVE MG/DL
APPEARANCE UR: CLEAR
BILIRUB UR QL STRIP: NEGATIVE
COLOR UR AUTO: YELLOW
GLUCOSE UR STRIP-MCNC: NEGATIVE MG/DL
HGB UR QL STRIP: NEGATIVE
KETONES UR STRIP-MCNC: NEGATIVE MG/DL
LEUKOCYTE ESTERASE UR QL STRIP: NEGATIVE
NITRATE UR QL: NEGATIVE
PH UR STRIP: 6 PH (ref 5–7)
SOURCE: NORMAL
SP GR UR STRIP: 1.01 (ref 1–1.03)
UROBILINOGEN UR STRIP-ACNC: 0.2 EU/DL (ref 0.2–1)

## 2019-03-12 PROCEDURE — 36415 COLL VENOUS BLD VENIPUNCTURE: CPT | Performed by: FAMILY MEDICINE

## 2019-03-12 PROCEDURE — 81003 URINALYSIS AUTO W/O SCOPE: CPT | Performed by: FAMILY MEDICINE

## 2019-03-12 PROCEDURE — 80048 BASIC METABOLIC PNL TOTAL CA: CPT | Performed by: FAMILY MEDICINE

## 2019-03-12 PROCEDURE — 99395 PREV VISIT EST AGE 18-39: CPT | Performed by: FAMILY MEDICINE

## 2019-03-12 PROCEDURE — 82043 UR ALBUMIN QUANTITATIVE: CPT | Performed by: FAMILY MEDICINE

## 2019-03-12 RX ORDER — ALBUTEROL SULFATE 90 UG/1
2 AEROSOL, METERED RESPIRATORY (INHALATION) EVERY 6 HOURS PRN
Qty: 6.7 G | Refills: 11 | Status: SHIPPED | OUTPATIENT
Start: 2019-03-12 | End: 2021-03-15

## 2019-03-12 RX ORDER — LISINOPRIL AND HYDROCHLOROTHIAZIDE 12.5; 2 MG/1; MG/1
1 TABLET ORAL DAILY
Qty: 90 TABLET | Refills: 3 | Status: SHIPPED | OUTPATIENT
Start: 2019-03-12 | End: 2020-03-13

## 2019-03-12 ASSESSMENT — ENCOUNTER SYMPTOMS
NAUSEA: 0
JOINT SWELLING: 0
HEMATURIA: 0
DYSURIA: 0
WEAKNESS: 0
PARESTHESIAS: 0
HEADACHES: 0
MYALGIAS: 0
PALPITATIONS: 0
ABDOMINAL PAIN: 0
FREQUENCY: 0
ARTHRALGIAS: 0
DIARRHEA: 0
FEVER: 0
SORE THROAT: 0
SHORTNESS OF BREATH: 0
EYE PAIN: 0
COUGH: 0
HEMATOCHEZIA: 0
HEARTBURN: 1
NERVOUS/ANXIOUS: 0
DIZZINESS: 0
CHILLS: 0
CONSTIPATION: 0

## 2019-03-12 ASSESSMENT — MIFFLIN-ST. JEOR: SCORE: 1692.37

## 2019-03-12 NOTE — PATIENT INSTRUCTIONS
Preventive Health Recommendations  Male Ages 26 - 39    Yearly exam:             See your health care provider every year in order to  o   Review health changes.   o   Discuss preventive care.    o   Review your medicines if your doctor has prescribed any.    You should be tested each year for STDs (sexually transmitted diseases), if you re at risk.     After age 35, talk to your provider about cholesterol testing. If you are at risk for heart disease, have your cholesterol tested at least every 5 years.     If you are at risk for diabetes, you should have a diabetes test (fasting glucose).  Shots: Get a flu shot each year. Get a tetanus shot every 10 years.     Nutrition:    Eat at least 5 servings of fruits and vegetables daily.     Eat whole-grain bread, whole-wheat pasta and brown rice instead of white grains and rice.     Get adequate Calcium and Vitamin D.     Lifestyle    Exercise for at least 150 minutes a week (30 minutes a day, 5 days a week). This will help you control your weight and prevent disease.     Limit alcohol to one drink per day.     No smoking.     Wear sunscreen to prevent skin cancer.     See your dentist every six months for an exam and cleaning.     To schedule your ultrasound/mammography, call [phone] for [facility] scheduling. [phone] at [facility] (or for [facility] cardiology procedure scheduling [phone])

## 2019-03-12 NOTE — PROGRESS NOTES
SUBJECTIVE:   CC: Greg Tripp is an 38 year old male who presents for preventative health visit.     Physical   Annual:     Getting at least 3 servings of Calcium per day:  Yes    Bi-annual eye exam:  NO    Dental care twice a year:  Yes    Sleep apnea or symptoms of sleep apnea:  None    Diet:  Regular (no restrictions)    Frequency of exercise:  2-3 days/week    Duration of exercise:  45-60 minutes    Taking medications regularly:  Yes    Medication side effects:  None    Additional concerns today:  Yes    PHQ-2 Total Score: 0      Today's PHQ-2 Score:   PHQ-2 ( 1999 [employer]) 3/12/2019   Q1: Little interest or pleasure in doing things 0   Q2: Feeling down, depressed or hopeless 0   PHQ-2 Score 0   Q1: Little interest or pleasure in doing things Not at all   Q2: Feeling down, depressed or hopeless Not at all   PHQ-2 Score 0       Abuse: Current or Past(Physical, Sexual or Emotional)- No  Do you feel safe in your environment? Yes    Social History     Tobacco Use     Smoking status: Never Smoker     Smokeless tobacco: Never Used   Substance Use Topics     Alcohol use: Yes     Alcohol/week: 1.0 oz     Comment: occ.     Alcohol Use 3/12/2019   If you drink alcohol do you typically have greater than 3 drinks per day OR greater than 7 drinks per week? Yes   No flowsheet data found.    Last PSA: No results found for: PSA    Reviewed orders with patient. Reviewed health maintenance and updated orders accordingly - Yes  Labs reviewed in EPIC    Reviewed and updated as needed this visit by clinical staff  Tobacco  Allergies  Meds  Med Hx  Surg Hx  Fam Hx  Soc Hx        Reviewed and updated as needed this visit by Provider            Review of Systems   Constitutional: Negative for chills and fever.   HENT: Positive for congestion. Negative for ear pain, hearing loss and sore throat.    Eyes: Negative for pain and visual disturbance.   Respiratory: Negative for cough and shortness of breath.    Cardiovascular:  "Negative for chest pain, palpitations and peripheral edema.   Gastrointestinal: Positive for heartburn. Negative for abdominal pain, constipation, diarrhea, hematochezia and nausea.   Genitourinary: Negative for dysuria, frequency, genital sores, hematuria and urgency.   Musculoskeletal: Negative for arthralgias, joint swelling and myalgias.   Skin: Positive for rash.   Neurological: Negative for dizziness, weakness, headaches and paresthesias.   Psychiatric/Behavioral: Negative for mood changes. The patient is not nervous/anxious.      Some pain in his breast left side and noted some lumps in the area of soreness.     OBJECTIVE:   /80   Pulse 67   Temp 98.6  F (37  C) (Oral)   Resp 16   Ht 1.753 m (5' 9\")   Wt 78.7 kg (173 lb 8 oz)   SpO2 100%   BMI 25.62 kg/m      Physical Exam  GENERAL: healthy, alert and no distress  EYES: Eyes grossly normal to inspection, PERRL and conjunctivae and sclerae normal  HENT: ear canals and TM's normal, nose and mouth without ulcers or lesions  NECK: no adenopathy, no asymmetry, masses, or scars and thyroid normal to palpation  RESP: lungs clear to auscultation - no rales, rhonchi or wheezes  BREAST: mild gynecomastia. There is some fatty appearing lumpiness at the inferior margin of the breast on the left   CV: regular rate and rhythm, normal S1 S2, no S3 or S4, no murmur, click or rub, no peripheral edema and peripheral pulses strong  ABDOMEN: soft, nontender, no hepatosplenomegaly, no masses and bowel sounds normal  MS: no gross musculoskeletal defects noted, no edema  SKIN: no suspicious lesions or rashes  NEURO: Normal strength and tone, mentation intact and speech normal  PSYCH: mentation appears normal, affect normal/bright    Diagnostic Test Results:  none     ASSESSMENT/PLAN:       ICD-10-CM    1. Routine general medical examination at a health care facility Z00.00    2. Exercise-induced bronchospasm J45.990 albuterol (PROAIR HFA/PROVENTIL HFA/VENTOLIN HFA) 108 " "(90 Base) MCG/ACT inhaler   3. Essential hypertension I10 lisinopril-hydrochlorothiazide (PRINZIDE/ZESTORETIC) 20-12.5 MG tablet     Basic metabolic panel     Albumin Random Urine Quantitative with Creat Ratio     *UA reflex to Microscopic and Culture ([facility] (except [place] and [place])   4. Painful lumpy left breast N64.4 MA Diagnostic Digital Bilateral    N63.20 US Breast Left Complete 4 Quadrants   refilled bp meds. Labs pending. To breast center for mammo with lump and pain.     COUNSELING:   Reviewed preventive health counseling, as reflected in patient instructions       Regular exercise       Healthy diet/nutrition       Immunizations    Declined: Influenza due to Conscientious objector            BP Readings from Last 1 Encounters:   06/26/18 115/78     Estimated body mass index is 25.62 kg/m  as calculated from the following:    Height as of this encounter: 1.753 m (5' 9\").    Weight as of this encounter: 78.7 kg (173 lb 8 oz).           reports that  has never smoked. he has never used smokeless tobacco.      Counseling Resources:  ATP IV Guidelines  Pooled Cohorts Equation Calculator  FRAX Risk Assessment  ICSI Preventive Guidelines  Dietary Guidelines for Americans, 2010  USDA's MyPlate  ASA Prophylaxis  Lung CA Screening    Eloy Gillespie MD  [facility]  "

## 2019-03-13 LAB
ANION GAP SERPL CALCULATED.3IONS-SCNC: 7 MMOL/L (ref 3–14)
BUN SERPL-MCNC: 17 MG/DL (ref 7–30)
CALCIUM SERPL-MCNC: 9.4 MG/DL (ref 8.5–10.1)
CHLORIDE SERPL-SCNC: 103 MMOL/L (ref 94–109)
CO2 SERPL-SCNC: 28 MMOL/L (ref 20–32)
CREAT SERPL-MCNC: 1.1 MG/DL (ref 0.66–1.25)
CREAT UR-MCNC: 34 MG/DL
GFR SERPL CREATININE-BSD FRML MDRD: 84 ML/MIN/{1.73_M2}
GLUCOSE SERPL-MCNC: 83 MG/DL (ref 70–99)
MICROALBUMIN UR-MCNC: <5 MG/L
MICROALBUMIN/CREAT UR: NORMAL MG/G CR (ref 0–17)
POTASSIUM SERPL-SCNC: 4.4 MMOL/L (ref 3.4–5.3)
SODIUM SERPL-SCNC: 138 MMOL/L (ref 133–144)

## 2019-03-13 ASSESSMENT — ASTHMA QUESTIONNAIRES: ACT_TOTALSCORE: 24

## 2019-06-10 ENCOUNTER — NURSE TRIAGE (OUTPATIENT)
Dept: FAMILY MEDICINE | Facility: CLINIC | Age: 39
End: 2019-06-10

## 2019-06-10 NOTE — PROGRESS NOTES
Subjective     Greg Tripp is a 39 year old male who presents to clinic today for the following health issues:    HPI   Abdominal Pain  cmpo      Duration: 6/6/2019    Description (location/character/radiation): right upper abdominal pain/discomfront        Associated flank pain: None    Intensity:  mild    Accompanying signs and symptoms: tender, played soccer think muscle is strained. Pt's daughter is sick and not sure if it's related.           Fever/Chills: no        Gas/Bloating: no        Nausea/vomitting: no        Diarrhea: YES       Dysuria or Hematuria: no     History (previous similar pain/trauma/previous testing): none     Precipitating or alleviating factors:       Pain worse with eating/BM/urination: none        Pain relieved by BM: no     Therapies tried and outcome: None    LMP:  not applicable    Pain onset over the past week. Initially after having too much alcohol. Since then cut way back and the symptoms are better but not gone. Concerned in part because of family hx of pancreatic cancer in his mother. No meds tried. Unclear relation to foods. No radiation. Some associated gassiness. No nausea or vomiting.      Reviewed and updated as needed this visit by Provider-includes his mothers passing. Drinks alcohol. No ibuprofen. has restarted playing soccer. Not sure if this may have worsened the pain.  Mod caffeine.          Review of Systems   ROS COMP: Constitutional, HEENT, cardiovascular, pulmonary, gi and gu systems are negative, except as otherwise noted.      Objective    /81   Pulse 75   Temp 98.8  F (37.1  C) (Oral)   Resp 18   Wt 78.9 kg (174 lb)   SpO2 99%   BMI 25.70 kg/m    Body mass index is 25.7 kg/m .  Physical Exam   GENERAL: healthy, alert and no distress  NECK: no adenopathy, no asymmetry, masses, or scars and thyroid normal to palpation  RESP: lungs clear to auscultation - no rales, rhonchi or wheezes  CV: regular rate and rhythm, normal S1 S2, no S3 or S4, no  "murmur, click or rub, no peripheral edema and peripheral pulses strong  ABDOMEN: soft, nontender, no hepatosplenomegaly, no masses and bowel sounds normal  MS: no gross musculoskeletal defects noted, no edema  PSYCH: mentation appears normal, affect normal/bright    Diagnostic Test Results:  Labs reviewed in Epic        Assessment & Plan     1. Abdominal pain, right upper quadrant  ? Etioloyg. Most likley is gastritis worse due to stress, alcohol. Use of OTC  meds. Discussed. follow up if persisting or worsening. Less likley would be gallstones, pancreatitis.   - Comprehensive metabolic panel  - Lipase  - CANCER RISK MGMT/CANCER GENETIC COUNSELING REFERRAL  - CBC with platelets    2. Family history of pancreatic cancer   recommended to see genetic cancer specailst       BMI:   Estimated body mass index is 25.7 kg/m  as calculated from the following:    Height as of 3/12/19: 1.753 m (5' 9\").    Weight as of this encounter: 78.9 kg (174 lb).       Return in about 3 weeks (around 7/2/2019), or if symptoms worsen or fail to improve.    Eloy Gillespie MD  [facility]            "

## 2019-06-10 NOTE — TELEPHONE ENCOUNTER
"  Additional Information    Negative: Passed out (i.e., fainted, collapsed and was not responding)    Negative: Shock suspected (e.g., cold/pale/clammy skin, too weak to stand, low BP, rapid pulse)    Negative: Sounds like a life-threatening emergency to the triager    Negative: Chest pain    Negative: Pain is mainly in upper abdomen (if needed ask: 'is it mainly above the belly button?')    Negative: SEVERE abdominal pain (e.g., excruciating)    Negative: Vomiting red blood or black (coffee ground) material    Negative: Bloody, black, or tarry bowel movements    Negative: Unable to urinate (or only a few drops) and bladder feels very full    Negative: Pain in scrotum persists > 1 hour    Negative: Constant abdominal pain lasting > 2 hours    Negative: Vomiting bile (green color)    Negative: Patient sounds very sick or weak to the triager    Negative: Vomiting and abdomen looks much more swollen than usual    Negative: White of the eyes have turned yellow (i.e., jaundice)    Negative: Blood in urine (red, pink, or tea-colored)    Negative: Fever > 103 F (39.4 C)    Negative: Fever > 101 F (38.3 C) and over 60 years of age    Negative: Fever > 100.0 F (37.8 C) and has diabetes mellitus or a weak immune system (e.g., HIV positive, cancer chemotherapy, organ transplant, splenectomy, chronic steroids)    Negative: Fever > 100.0 F (37.8 C) and bedridden (e.g., nursing home patient, stroke, chronic illness, recovering from surgery)    MILD pain that comes and goes (cramps) lasts > 24 hours    Answer Assessment - Initial Assessment Questions  1. LOCATION: \"Where does it hurt?\"       Underneath right rib.  Not a pain but a pressure feeling.  2. RADIATION: \"Does the pain shoot anywhere else?\" (e.g., chest, back)      No  3. ONSET: \"When did the pain begin?\" (Minutes, hours or days ago)       6/6/19 morning, after patient's mother passed away.  Drank alcohol that day.  Is not drinking alcohol throughout the day but is " "drinking alcohol in evenings since mother passed away.  4. SUDDEN: \"Gradual or sudden onset?\"      Not sudden, but feels \"it is there\"  5. PATTERN \"Does the pain come and go, or is it constant?\"     - If constant: \"Is it getting better, staying the same, or worsening?\"       (Note: Constant means the pain never goes away completely; most serious pain is constant and it progresses)      - If intermittent: \"How long does it last?\" \"Do you have pain now?\"      (Note: Intermittent means the pain goes away completely between bouts)      Intermittent  6. SEVERITY: \"How bad is the pain?\"  (e.g., Scale 1-10; mild, moderate, or severe)     - MILD (1-3): doesn't interfere with normal activities, abdomen soft and not tender to touch      - MODERATE (4-7): interferes with normal activities or awakens from sleep, tender to touch      - SEVERE (8-10): excruciating pain, doubled over, unable to do any normal activities        Mild  7. RECURRENT SYMPTOM: \"Have you ever had this type of abdominal pain before?\" If so, ask: \"When was the last time?\" and \"What happened that time?\"       Similar feeling as to when had gas build up, but does not seem to be related to gas.  8. CAUSE: \"What do you think is causing the abdominal pain?\"      Unsure  9. RELIEVING/AGGRAVATING FACTORS: \"What makes it better or worse?\" (e.g., movement, antacids, bowel movement)      Time makes it better, pressure feeling will pass.  10. OTHER SYMPTOMS: \"Has there been any vomiting, diarrhea, constipation, or urine problems?\"        A little constipation and also had a watery stool.  No emesis, urinary issues.    Protocols used: ABDOMINAL PAIN - MALE-A-OH    AINSLEY Bellamy, ESTHERN, RN    "

## 2019-06-10 NOTE — TELEPHONE ENCOUNTER
Reason for Call:  Other call back    Detailed comments: pt called and is having lower abdomin discomfort wants to speak with a nurse.Please Advise    Phone Number Patient can be reached at: Home number on file [phone] (home)    Best Time: anytime    Can we leave a detailed message on this number? YES    Call taken on 6/10/2019 at 11:52 AM by Judi Gutierrez

## 2019-06-11 ENCOUNTER — OFFICE VISIT (OUTPATIENT)
Dept: FAMILY MEDICINE | Facility: CLINIC | Age: 39
End: 2019-06-11
Payer: COMMERCIAL

## 2019-06-11 ENCOUNTER — TELEPHONE (OUTPATIENT)
Dept: ONCOLOGY | Facility: CLINIC | Age: 39
End: 2019-06-11

## 2019-06-11 VITALS
DIASTOLIC BLOOD PRESSURE: 81 MMHG | HEART RATE: 75 BPM | TEMPERATURE: 98.8 F | BODY MASS INDEX: 25.7 KG/M2 | SYSTOLIC BLOOD PRESSURE: 112 MMHG | RESPIRATION RATE: 18 BRPM | OXYGEN SATURATION: 99 % | WEIGHT: 174 LBS

## 2019-06-11 DIAGNOSIS — R10.11 ABDOMINAL PAIN, RIGHT UPPER QUADRANT: Primary | ICD-10-CM

## 2019-06-11 DIAGNOSIS — Z80.0 FAMILY HISTORY OF PANCREATIC CANCER: ICD-10-CM

## 2019-06-11 LAB
ALBUMIN SERPL-MCNC: 4.1 G/DL (ref 3.4–5)
ALP SERPL-CCNC: 58 U/L (ref 40–150)
ALT SERPL W P-5'-P-CCNC: 25 U/L (ref 0–70)
ANION GAP SERPL CALCULATED.3IONS-SCNC: 9 MMOL/L (ref 3–14)
AST SERPL W P-5'-P-CCNC: 20 U/L (ref 0–45)
BILIRUB SERPL-MCNC: 0.8 MG/DL (ref 0.2–1.3)
BUN SERPL-MCNC: 12 MG/DL (ref 7–30)
CALCIUM SERPL-MCNC: 9.7 MG/DL (ref 8.5–10.1)
CHLORIDE SERPL-SCNC: 105 MMOL/L (ref 94–109)
CO2 SERPL-SCNC: 26 MMOL/L (ref 20–32)
CREAT SERPL-MCNC: 1.1 MG/DL (ref 0.66–1.25)
ERYTHROCYTE [DISTWIDTH] IN BLOOD BY AUTOMATED COUNT: 12.1 % (ref 10–15)
GFR SERPL CREATININE-BSD FRML MDRD: 83 ML/MIN/{1.73_M2}
GLUCOSE SERPL-MCNC: 77 MG/DL (ref 70–99)
HCT VFR BLD AUTO: 42.8 % (ref 40–53)
HGB BLD-MCNC: 14.7 G/DL (ref 13.3–17.7)
LIPASE SERPL-CCNC: 122 U/L (ref 73–393)
MCH RBC QN AUTO: 31.9 PG (ref 26.5–33)
MCHC RBC AUTO-ENTMCNC: 34.3 G/DL (ref 31.5–36.5)
MCV RBC AUTO: 93 FL (ref 78–100)
PLATELET # BLD AUTO: 209 10E9/L (ref 150–450)
POTASSIUM SERPL-SCNC: 4.3 MMOL/L (ref 3.4–5.3)
PROT SERPL-MCNC: 7.7 G/DL (ref 6.8–8.8)
RBC # BLD AUTO: 4.61 10E12/L (ref 4.4–5.9)
SODIUM SERPL-SCNC: 140 MMOL/L (ref 133–144)
WBC # BLD AUTO: 3.5 10E9/L (ref 4–11)

## 2019-06-11 PROCEDURE — 85027 COMPLETE CBC AUTOMATED: CPT | Performed by: FAMILY MEDICINE

## 2019-06-11 PROCEDURE — 36415 COLL VENOUS BLD VENIPUNCTURE: CPT | Performed by: FAMILY MEDICINE

## 2019-06-11 PROCEDURE — 83690 ASSAY OF LIPASE: CPT | Performed by: FAMILY MEDICINE

## 2019-06-11 PROCEDURE — 80053 COMPREHEN METABOLIC PANEL: CPT | Performed by: FAMILY MEDICINE

## 2019-06-11 PROCEDURE — 99214 OFFICE O/P EST MOD 30 MIN: CPT | Performed by: FAMILY MEDICINE

## 2019-06-11 NOTE — TELEPHONE ENCOUNTER
Left message for patient to call back, regarding referral for genetic counseling    Please schedule off of the order.

## 2019-06-11 NOTE — PATIENT INSTRUCTIONS
If the labs are normal, then lets have you take ranitidine 150mg twice daily for the discomfort. Also cut back on the alcohol and caffeine until the symptoms resolved.

## 2019-06-11 NOTE — TELEPHONE ENCOUNTER
ONCOLOGY INTAKE: Records Information      APPT INFORMATION:  Referring provider:  Eloy Gillespie  Referring provider s clinic:  [facility]  Reason for visit/diagnosis:  Genetics  Has patient been notified of appointment date and time?: Per PT    RECORDS INFORMATION:  Were the records received with the referral (via [employer])? No - Internal Referral    Has patient been seen for any external appt for this diagnosis? Per PT, no outside records    ADDITIONAL INFORMATION:  NA

## 2019-06-19 DIAGNOSIS — J30.2 SEASONAL ALLERGIC RHINITIS: ICD-10-CM

## 2019-06-20 NOTE — TELEPHONE ENCOUNTER
"Requested Prescriptions   Pending Prescriptions Disp Refills     fluticasone (FLONASE) 50 MCG/ACT nasal spray [Pharmacy Med Name: FLUTICASONE 50MCG NASAL SP (120) RX] 16 mL 0     Sig: SHAKE LIQUID AND USE 1 TO 2 SPRAYS IN EACH NOSTRIL DAILY      Last Written Prescription Date:  5/2/2018  Last Fill Quantity: 1 bottle     ,  # refills: 11   Last Office Visit: 6/11/2019   Future Office Visit:            Inhaled Steroids Protocol Passed - 6/19/2019  1:37 PM        Passed - Patient is age 12 or older        Passed - Asthma control assessment score within normal limits in last 6 months     Please review ACT score.         Passed - Medication is active on med list        Passed - Recent (6 mo) or future (30 days) visit within the authorizing provider's specialty     Patient had office visit in the last 6 months or has a visit in the next 30 days with authorizing provider or within the authorizing provider's specialty.  See \"Patient Info\" tab in inbasket, or \"Choose Columns\" in Meds & Orders section of the refill encounter.          ACT Total Scores 2/22/2017 2/27/2018 3/12/2019   ACT TOTAL SCORE (Goal Greater than or Equal to 20) 25 25 24   In the past 12 months, how many times did you visit the emergency room for your asthma without being admitted to the hospital? 0 0 0   In the past 12 months, how many times were you hospitalized overnight because of your asthma? 0 0 0       "

## 2019-06-21 RX ORDER — FLUTICASONE PROPIONATE 50 MCG
SPRAY, SUSPENSION (ML) NASAL
Qty: 48 ML | Refills: 0 | Status: SHIPPED | OUTPATIENT
Start: 2019-06-21 | End: 2020-09-16

## 2019-06-21 NOTE — TELEPHONE ENCOUNTER
Prescription approved per [facility] Refill Protocol.  Jesusita Schwarz RN

## 2019-07-25 ENCOUNTER — THERAPY VISIT (OUTPATIENT)
Dept: PHYSICAL THERAPY | Facility: CLINIC | Age: 39
End: 2019-07-25
Payer: COMMERCIAL

## 2019-07-25 DIAGNOSIS — S76.319A HAMSTRING MUSCLE STRAIN: ICD-10-CM

## 2019-07-25 PROCEDURE — 97110 THERAPEUTIC EXERCISES: CPT | Mod: GP | Performed by: PHYSICAL THERAPIST

## 2019-07-25 PROCEDURE — 97161 PT EVAL LOW COMPLEX 20 MIN: CPT | Mod: GP | Performed by: PHYSICAL THERAPIST

## 2019-07-25 NOTE — PROGRESS NOTES
[facility] Initial Evaluation    Subjective:  Pt presents to PT with a chief complaint of L proximal hamstring pain with reported onset 2 months ago while playing soccer. Pt attempted conservative management with relative rest and reports gradual improvements in pain. However, pain still present with running, pivoting, and twisting.       Type of problem:  Left hip   Condition occurred with:  Repetition/overuse. This is a new condition   Problem details: 2 months ago 05/2019.   Site of Pain: L hamstring origin (ischial tuberosity tenderness) Radiates to:  No radiation. Associated symptoms:  Loss of motion/stiffness and loss of strength. Symptoms are exacerbated by ascending stairs, descending stairs and bending/squatting and relieved by rest and ice.    Where condition occurred: during recreation / sport.  and reported as 3/10 on pain scale. General health as reported by patient is good. Pertinent medical history includes:  None.    Surgeries include:  None.  Current medications:  None.   Primary job tasks include:  Computer work.  Pain is described as aching and is intermittent. Pain is the same all the time. Since onset symptoms are gradually improving.      Occupation: .   Barriers include:  None as reported by patient.  Red flags:  None as reported by patient.                      Objective:  System                                           Hip Evaluation  HIP AROM:  : loss of L hamstring flexibility (SLR 80 deg) R hamstring SLR: 95 deg.                    Hip Strength:    Flexion:   Left: 5/5   Pain:  Right: 5/5   Pain:                    Extension:  Left: 4/5  Pain:Right: 4+/5    Pain:    Abduction:  Left: 4+/5     Pain:Right: 4+/5    Pain:        Knee Flexion:  Left: 4+/5   +  Pain:Right: 5/5   Pain:            Hip Palpation:    Left hip tenderness present at:   Ischial Tuberosity               General     ROS    Assessment/Plan:    Patient is a 39 year old male with left side  hip complaints.    Patient has the following significant findings with corresponding treatment plan.                Diagnosis 1:  L hamstring strain  Pain -  manual therapy, self management and education  Decreased ROM/flexibility - manual therapy and therapeutic exercise  Decreased strength - therapeutic exercise and therapeutic activities  Impaired muscle performance - neuro re-education    Therapy Evaluation Codes:   Cumulative Therapy Evaluation is: Low complexity.    Previous and current functional limitations:  (See Goal Flow Sheet for this information)    Short term and Long term goals: (See Goal Flow Sheet for this information)     Communication ability:  Patient appears to be able to clearly communicate and understand verbal and written communication and follow directions correctly.  Treatment Explanation - The following has been discussed with the patient:   RX ordered/plan of care  Anticipated outcomes  Possible risks and side effects  This patient would benefit from PT intervention to resume normal activities.   Rehab potential is good.    Frequency:  2 X month, once daily  Duration:  for 2 months  Discharge Plan:  Achieve all LTG.  Independent in home treatment program.  Reach maximal therapeutic benefit.    Please refer to the daily flowsheet for treatment today, total treatment time and time spent performing 1:1 timed codes.

## 2019-07-26 PROBLEM — S76.319A HAMSTRING MUSCLE STRAIN: Status: ACTIVE | Noted: 2019-07-26

## 2019-09-12 ENCOUNTER — E-VISIT (OUTPATIENT)
Dept: FAMILY MEDICINE | Facility: CLINIC | Age: 39
End: 2019-09-12
Payer: COMMERCIAL

## 2019-09-12 DIAGNOSIS — R10.13 DYSPEPSIA AND DISORDER OF FUNCTION OF STOMACH: Primary | ICD-10-CM

## 2019-09-12 DIAGNOSIS — K31.9 DYSPEPSIA AND DISORDER OF FUNCTION OF STOMACH: Primary | ICD-10-CM

## 2019-09-12 PROCEDURE — 99444 ZZC PHYSICIAN ONLINE EVALUATION & MANAGEMENT SERVICE: CPT | Performed by: FAMILY MEDICINE

## 2019-09-12 RX ORDER — LANSOPRAZOLE 30 MG/1
30 CAPSULE, DELAYED RELEASE ORAL DAILY
Qty: 30 CAPSULE | Refills: 0 | Status: SHIPPED | OUTPATIENT
Start: 2019-09-12 | End: 2020-03-13

## 2019-09-25 PROBLEM — S76.319A HAMSTRING MUSCLE STRAIN: Status: RESOLVED | Noted: 2019-07-26 | Resolved: 2019-09-25

## 2019-09-27 ENCOUNTER — HEALTH MAINTENANCE LETTER (OUTPATIENT)
Age: 39
End: 2019-09-27

## 2019-10-09 ENCOUNTER — MYC MEDICAL ADVICE (OUTPATIENT)
Dept: FAMILY MEDICINE | Facility: CLINIC | Age: 39
End: 2019-10-09

## 2019-10-09 ENCOUNTER — TELEPHONE (OUTPATIENT)
Dept: GASTROENTEROLOGY | Facility: CLINIC | Age: 39
End: 2019-10-09

## 2019-10-09 DIAGNOSIS — R10.9 ABDOMINAL PAIN: Primary | ICD-10-CM

## 2019-10-09 NOTE — TELEPHONE ENCOUNTER
Patient has already has been seen by genetic counseling. Pt now will be asking his primary care provider for gastroenterology referral for abd pain.  Pt now scheduled on October 18 at 4 pm. Pt has not had any endoscopy procedures. Pt has been taking a ppi.

## 2019-10-09 NOTE — TELEPHONE ENCOUNTER
[facility] Call Center    Phone Message    May a detailed message be left on voicemail: yes    Reason for Call: Other: Patient has been experiencing abdominal pain. Often it comes in short bursts. mother recently  of pancreatic cancer, which prompted him wanting to be seen about this issue. Records in care everywhere, seen Dr. Gillespie at [facility]. Please review and contact patient.     Action Taken: Message routed to:  [facility] GI

## 2019-10-11 NOTE — TELEPHONE ENCOUNTER
RECORDS RECEIVED FROM: Internal    DATE RECEIVED: 10/18/19    NOTES STATUS DETAILS   OFFICE NOTE from referring provider Internal All recs in epic to review    OFFICE NOTE from other specialist N/A    DISCHARGE SUMMARY from hospital N/A    OPERATIVE REPORT N/A    MEDICATION LIST N/A         ENDOSCOPY  N/A    COLONOSCOPY N/A    ERCP N/A    EUS N/A    STOOL TESTING N/A    PERTINENT LABS Internal    PATHOLOGY REPORTS (RELATED) N/A    IMAGING (CT, MRI, EGD) N/A      REFERRAL INFORMATION    Date referral was placed: 10/18/19    Date all records received: N/A   Date records were scanned into Epic: N/A   Date records were sent to Provider to review: N/A   Date and recommendation received from provider:  LETTER SENT  SCHEDULE APPOINTMENT   Date patient was contacted to schedule: 10/09/19

## 2019-10-14 ENCOUNTER — TELEPHONE (OUTPATIENT)
Dept: GASTROENTEROLOGY | Facility: CLINIC | Age: 39
End: 2019-10-14

## 2019-10-14 NOTE — TELEPHONE ENCOUNTER
Spoke to patient reminding of appointment scheduled on 10/18/19 at 4pm with [facility]. Patient to arrive 15 min early. To reschedule or cancel patient to call [phone].      KIRILL Montes

## 2019-10-18 ENCOUNTER — OFFICE VISIT (OUTPATIENT)
Dept: GASTROENTEROLOGY | Facility: CLINIC | Age: 39
End: 2019-10-18
Payer: COMMERCIAL

## 2019-10-18 ENCOUNTER — PRE VISIT (OUTPATIENT)
Dept: GASTROENTEROLOGY | Facility: CLINIC | Age: 39
End: 2019-10-18

## 2019-10-18 VITALS
OXYGEN SATURATION: 99 % | HEIGHT: 69 IN | HEART RATE: 67 BPM | DIASTOLIC BLOOD PRESSURE: 96 MMHG | WEIGHT: 179.6 LBS | SYSTOLIC BLOOD PRESSURE: 142 MMHG | BODY MASS INDEX: 26.6 KG/M2

## 2019-10-18 DIAGNOSIS — R10.13 EPIGASTRIC PAIN: ICD-10-CM

## 2019-10-18 DIAGNOSIS — R10.11 RUQ ABDOMINAL PAIN: ICD-10-CM

## 2019-10-18 RX ORDER — OMEPRAZOLE 40 MG/1
40 CAPSULE, DELAYED RELEASE ORAL DAILY
Qty: 90 CAPSULE | Refills: 3 | Status: SHIPPED | OUTPATIENT
Start: 2019-10-18 | End: 2020-03-13

## 2019-10-18 ASSESSMENT — ENCOUNTER SYMPTOMS
SKIN CHANGES: 0
NAUSEA: 0
ABDOMINAL PAIN: 1
BLOATING: 0
NAIL CHANGES: 0
VOMITING: 0
POOR WOUND HEALING: 0
BLOOD IN STOOL: 0
JAUNDICE: 0
RECTAL PAIN: 0
BOWEL INCONTINENCE: 0
DIARRHEA: 0
CONSTIPATION: 0
HEARTBURN: 1

## 2019-10-18 ASSESSMENT — PAIN SCALES - GENERAL: PAINLEVEL: NO PAIN (0)

## 2019-10-18 ASSESSMENT — MIFFLIN-ST. JEOR: SCORE: 1715.04

## 2019-10-18 NOTE — PROGRESS NOTES
GI CLINIC VISIT    CC/REFERRING MD:  Referred Self  REASON FOR CONSULTATION: Right upper quadrant abdominal pain    ASSESSMENT/PLAN:  1.  Right upper quadrant abdominal pain  Has had normal hepatic panel, lipase, unremarkable CBC.  Differential for vague symptoms is broad and includes GERD, functional dyspepsia, biliary pain, gastritis, or functional abdominal pain among others.  Given family history of pancreatic cancer with possible genetic component, will plan to evaluate for any structural pancreatic abnormality in addition to gallbladder abnormality with MRCP.  Agree with plan to meet with genetic counselor to discuss risk.  Patient also concerned about possible ulcer.  Given persistent symptoms despite trial of H2 blocker and PPI, will evaluate with upper endoscopy with biopsies for H. pylori and celiac disease.  Has not had significant improvement in symptoms with lansoprazole.  Would recommend trial of omeprazole to see if this improves symptoms.  Also discussed lifestyle modifications for GERD.    If above work-up is within normal limits, patient may benefit from meeting with our dietitian to discuss low gas/low FODMAP diet, and meeting with our GI health psychologist to discuss mind gut connection.  May also benefit from antispasmodic or tricyclic antidepressant for pain management.    -- plan for MRI- To schedule imaging, please call [phone]   -- take omeprazole 40 mg on an empty stomach 30-60 minutes before eating or drinking   -- will plan for EGD- they will call to schedule   -- Agree with plan with meet with genetic counseling   GERD Lifestyle Modifications  -- Avoid foods high in fat or high fructose corn syrup  -- do not eat within three hours of laying down or going to bed  -- raise the head of your bed 6-8 inches  -- avoid alcohol    Colorectal cancer screening: No significant family history of colorectal cancer.  Would recommend routine screening at age 45-50 or sooner with change or  "worsening symptoms.    RTC 2 months    Thank you for this consultation.  It was a pleasure to participate in the care of this patient; please contact us with any further questions.     A total of 45+ face-to-face minutes was spent with this patient, >50% of which was counseling regarding the above delineated issues.      Emma Pack PA-C  Division of Gastroenterology, Hepatology & Nutrition  [facility]        HPI  Greg Tripp is a 39 year old male with a past medical history of HTN presenting for evaluation of abdominal pain.    Patient reports last winter, his mother was diagnosed with pancreatic cancer, he is planning to meet with genetic counseling to further discuss potential genetic connection in 1 month.  She passed away in  of this summer.  Notes that on the day she , he drank increased amount of alcohol.  Then noticed a severe abdominal pain located in his right upper quadrant.  Was then seen by his primary care provider where he had normal hepatic panel and lipase, told to take H2 blocker, but this lessened his symptoms however they have persisted.    Now states he has some degree of discomfort on a daily basis.  The nature of the pain can change.  Does not seem to be worse in any certain time a day.  Has difficulty describing the sensation and states it can occasionally \"feel like a balloon\", like a mechanical click, or focal \"heartburn \".  This can sometimes be accompanied by increased gas, bloating.  Occasionally reports acid in his mouth, is not always tied to the pain.  Otherwise denies typical GERD symptoms.  States he can feel this discomfort both when he is feeling hungry, and after eating.  Denies early satiety, nausea, vomiting.  Occasionally the pain will move to the left side of his abdomen.  Pain is not worsened with movement, touching the area.    Is having soft daily bowel movements, occasionally will have a loose stool.      ROS:    No fevers or chills  No " weight loss, slightly increase   No blurry vision, double vision or change in vision  + sore throat, lymphadenopathy- with seasonal allergies   No headache, paraesthesias, or weakness in a limb  + raynaud's on fingers   No shortness of breath or wheezing  No chest pain or pressure  No arthralgias or myalgias   No rashes or skin changes + exzema   No odynophagia or dysphagia  No BRBPR, hematochezia, melena  No dysuria, frequency or urgency  No hot/cold intolerance or polyria  + anxiety    PROBLEM LIST  Patient Active Problem List    Diagnosis Date Noted     Exercise-induced bronchospasm 02/27/2018     Priority: Medium     Hypertension 01/06/2015     Priority: Medium     Seasonal allergic rhinitis 06/17/2011     Priority: Medium     Viral warts 04/03/2008     Priority: Medium     Problem list name updated by automated process. Provider to review         PERTINENT PAST MEDICAL HISTORY:  Past Medical History:   Diagnosis Date     Hypertension 2017    Genetic, father     NO ACTIVE PROBLEMS allergies     Skin disease 2017/18    Eczema     Stomach problems 6/2019    What I'm being seen for     Uncomplicated asthma 2015    Sports-induced     Urinary tract infection 12/2017       PREVIOUS SURGERIES:  Past Surgical History:   Procedure Laterality Date     ABDOMEN SURGERY  2003    Appendectomy     C APPENDECTOMY  1/04     SURGICAL HISTORY OF -   7/03    right knee arthoscopy       PREVIOUS ENDOSCOPY:  None     ALLERGIES:     Allergies   Allergen Reactions     Penicillins        PERTINENT MEDICATIONS:    Current Outpatient Medications:      albuterol (PROAIR HFA/PROVENTIL HFA/VENTOLIN HFA) 108 (90 Base) MCG/ACT inhaler, Inhale 2 puffs into the lungs every 6 hours as needed for shortness of breath / dyspnea or wheezing, Disp: 6.7 g, Rfl: 11     Cetirizine HCl (ZYRTEC ALLERGY PO), Take by mouth daily, Disp: , Rfl:      fluticasone (FLONASE) 50 MCG/ACT nasal spray, SHAKE LIQUID AND USE 1 TO 2 SPRAYS IN EACH NOSTRIL DAILY, Disp: 48  mL, Rfl: 0     LANsoprazole (PREVACID) 30 MG DR capsule, Take 1 capsule (30 mg) by mouth daily, Disp: 30 capsule, Rfl: 0     lisinopril-hydrochlorothiazide (PRINZIDE/ZESTORETIC) 20-12.5 MG tablet, Take 1 tablet by mouth daily, Disp: 90 tablet, Rfl: 3     UNABLE TO FIND, MEDICATION NAME: topical steroid, Disp: , Rfl:     SOCIAL HISTORY:  Alcohol- on average 2 beers a night, (up to 3-4 at the most), no drugs  Social History     Socioeconomic History     Marital status:      Spouse name: Not on file     Number of children: Not on file     Years of education: Not on file     Highest education level: Not on file   Occupational History     Occupation: pc Tech.     Comment: [employer]   Social Needs     Financial resource strain: Not on file     Food insecurity:     Worry: Not on file     Inability: Not on file     Transportation needs:     Medical: Not on file     Non-medical: Not on file   Tobacco Use     Smoking status: Never Smoker     Smokeless tobacco: Never Used   Substance and Sexual Activity     Alcohol use: Yes     Alcohol/week: 1.7 standard drinks     Comment: occ.     Drug use: No     Sexual activity: Never     Birth control/protection: None   Lifestyle     Physical activity:     Days per week: Not on file     Minutes per session: Not on file     Stress: Not on file   Relationships     Social connections:     Talks on phone: Not on file     Gets together: Not on file     Attends [employer] service: Not on file     Active member of club or organization: Not on file     Attends meetings of clubs or organizations: Not on file     Relationship status: Not on file     Intimate partner violence:     Fear of current or ex partner: Not on file     Emotionally abused: Not on file     Physically abused: Not on file     Forced sexual activity: Not on file   Other Topics Concern      Service Not Asked     Blood Transfusions Not Asked     Caffeine Concern Not Asked     Occupational Exposure Not Asked     Hobby  "Hazards Not Asked     Sleep Concern Not Asked     Stress Concern Not Asked     Weight Concern Not Asked     Special Diet Not Asked     Back Care Not Asked     Exercise Yes     Bike Helmet Not Asked     Seat Belt Yes     Self-Exams Yes     Parent/sibling w/ CABG, MI or angioplasty before 65F 55M? No   Social History Narrative    PC consultant        FAMILY HISTORY:  FH of CRC: none   Mother diagnosed with pancreatic cancer (per patient's brother had genetic marker)  Family History   Problem Relation Age of Onset     Depression Paternal Grandmother         Not 100%     Hypertension Father      Family History Negative Mother      Other Cancer Mother         Pancreatic     Prostate Cancer Maternal Grandfather      Coronary Artery Disease Paternal Grandfather      Hypertension Paternal Grandfather      Substance Abuse Brother        Past/family/social history reviewed and no changes    PHYSICAL EXAMINATION:  Constitutional: aaox3, cooperative, pleasant, not dyspneic/diaphoretic, no acute distress  Vitals reviewed: BP (!) 142/96   Pulse 67   Ht 1.753 m (5' 9\")   Wt 81.5 kg (179 lb 9.6 oz)   SpO2 99%   BMI 26.52 kg/m    Wt:   Wt Readings from Last 2 Encounters:   10/18/19 81.5 kg (179 lb 9.6 oz)   06/11/19 78.9 kg (174 lb)      Eyes: Sclera anicteric/injected  Ears/nose/mouth/throat: Normal oropharynx without ulcers or exudate, mucus membranes moist, hearing intact  Neck: supple, thyroid normal size  CV: No edema  Respiratory: Unlabored breathing  Lymph: No submandibular, supraclavicular lymphadenopathy  Abd: Nondistended, no hepatosplenomegaly, nontender, no peritoneal signs  Skin: warm, perfused, no jaundice  Psych: Normal affect  MSK: Normal gait    PERTINENT STUDIES:  Office Visit on 06/11/2019   Component Date Value Ref Range Status     Sodium 06/11/2019 140  133 - 144 mmol/L Final     Potassium 06/11/2019 4.3  3.4 - 5.3 mmol/L Final     Chloride 06/11/2019 105  94 - 109 mmol/L Final     Carbon Dioxide 06/11/2019 " 26  20 - 32 mmol/L Final     Anion Gap 06/11/2019 9  3 - 14 mmol/L Final     Glucose 06/11/2019 77  70 - 99 mg/dL Final     Urea Nitrogen 06/11/2019 12  7 - 30 mg/dL Final     Creatinine 06/11/2019 1.10  0.66 - 1.25 mg/dL Final     GFR Estimate 06/11/2019 83  >60 mL/min/[1.73_m2] Final     GFR Estimate If Black 06/11/2019 >90  >60 mL/min/[1.73_m2] Final     Calcium 06/11/2019 9.7  8.5 - 10.1 mg/dL Final     Bilirubin Total 06/11/2019 0.8  0.2 - 1.3 mg/dL Final     Albumin 06/11/2019 4.1  3.4 - 5.0 g/dL Final     Protein Total 06/11/2019 7.7  6.8 - 8.8 g/dL Final     Alkaline Phosphatase 06/11/2019 58  40 - 150 U/L Final     ALT 06/11/2019 25  0 - 70 U/L Final     AST 06/11/2019 20  0 - 45 U/L Final     Lipase 06/11/2019 122  73 - 393 U/L Final     WBC 06/11/2019 3.5* 4.0 - 11.0 10e9/L Final     RBC Count 06/11/2019 4.61  4.4 - 5.9 10e12/L Final     Hemoglobin 06/11/2019 14.7  13.3 - 17.7 g/dL Final     Hematocrit 06/11/2019 42.8  40.0 - 53.0 % Final     MCV 06/11/2019 93  78 - 100 fl Final     MCH 06/11/2019 31.9  26.5 - 33.0 pg Final     MCHC 06/11/2019 34.3  31.5 - 36.5 g/dL Final     RDW 06/11/2019 12.1  10.0 - 15.0 % Final     Platelet Count 06/11/2019 209  150 - 450 10e9/L Final     Answers for HPI/ROS submitted by the patient on 10/18/2019   General Symptoms: No  Skin Symptoms: Yes  HENT Symptoms: No  EYE SYMPTOMS: No  HEART SYMPTOMS: No  LUNG SYMPTOMS: No  INTESTINAL SYMPTOMS: Yes  URINARY SYMPTOMS: No  REPRODUCTIVE SYMPTOMS: No  SKELETAL SYMPTOMS: No  BLOOD SYMPTOMS: No  NERVOUS SYSTEM SYMPTOMS: No  MENTAL HEALTH SYMPTOMS: No  Changes in hair: No  Changes in moles/birth marks: No  Itching: Yes  Rashes: Yes  Changes in nails: No  Acne: No  Change in facial hair: No  Warts: No  Non-healing sores: No  Scarring: No  Flaking of skin: No  Color changes of hands/feet in cold : Yes  Sun sensitivity: No  Skin thickening: No  Heart burn or indigestion: Yes  Nausea: No  Vomiting: No  Abdominal pain: Yes  Bloating:  No  Constipation: No  Diarrhea: No  Blood in stool: No  Black stools: No  Rectal or Anal pain: No  Fecal incontinence: No  Yellowing of skin or eyes: No  Vomit with blood: No  Change in stools: No

## 2019-10-18 NOTE — NURSING NOTE
"Chief Complaint   Patient presents with     New Patient     new consult, abdominal pain       Vitals:    10/18/19 1553   BP: (!) 142/96   Pulse: 67   SpO2: 99%   Weight: 81.5 kg (179 lb 9.6 oz)   Height: 1.753 m (5' 9\")       Body mass index is 26.52 kg/m .    Romelia Cabrera CMA    "

## 2019-10-18 NOTE — LETTER
10/18/2019       RE: Greg Tripp  [address] [place] [address]     Dear Colleague,    Thank you for referring your patient, Greg Tripp, to the [facility] GASTRO[facility] IBD [facility]INIC at [facility]. Please see a copy of my visit note below.    GI CLINIC VISIT    CC/REFERRING MD:  Referred Self  REASON FOR CONSULTATION: Right upper quadrant abdominal pain    ASSESSMENT/PLAN:  1.  Right upper quadrant abdominal pain  Has had normal hepatic panel, lipase, unremarkable CBC.  Differential for vague symptoms is broad and includes GERD, functional dyspepsia, biliary pain, gastritis, or functional abdominal pain among others.  Given family history of pancreatic cancer with possible genetic component, will plan to evaluate for any structural pancreatic abnormality in addition to gallbladder abnormality with MRCP.  Agree with plan to meet with genetic counselor to discuss risk.  Patient also concerned about possible ulcer.  Given persistent symptoms despite trial of H2 blocker and PPI, will evaluate with upper endoscopy with biopsies for H. pylori and celiac disease.  Has not had significant improvement in symptoms with lansoprazole.  Would recommend trial of omeprazole to see if this improves symptoms.  Also discussed lifestyle modifications for GERD.    If above work-up is within normal limits, patient may benefit from meeting with our dietitian to discuss low gas/low FODMAP diet, and meeting with our GI health psychologist to discuss mind gut connection.  May also benefit from antispasmodic or tricyclic antidepressant for pain management.    -- plan for MRI- To schedule imaging, please call [phone]   -- take omeprazole 40 mg on an empty stomach 30-60 minutes before eating or drinking   -- will plan for EGD- they will call to schedule   -- Agree with plan with meet with genetic counseling   GERD Lifestyle Modifications  -- Avoid foods high in fat or  "high fructose corn syrup  -- do not eat within three hours of laying down or going to bed  -- raise the head of your bed 6-8 inches  -- avoid alcohol    Colorectal cancer screening: No significant family history of colorectal cancer.  Would recommend routine screening at age 45-50 or sooner with change or worsening symptoms.    RTC 2 months    Thank you for this consultation.  It was a pleasure to participate in the care of this patient; please contact us with any further questions.     A total of 45+ face-to-face minutes was spent with this patient, >50% of which was counseling regarding the above delineated issues.    Emma Pack PA-C  Division of Gastroenterology, Hepatology & Nutrition  [facility]      HPI  Greg Tripp is a 39 year old male with a past medical history of HTN presenting for evaluation of abdominal pain.    Patient reports last winter, his mother was diagnosed with pancreatic cancer, he is planning to meet with genetic counseling to further discuss potential genetic connection in 1 month.  She passed away in  of this summer.  Notes that on the day she , he drank increased amount of alcohol.  Then noticed a severe abdominal pain located in his right upper quadrant.  Was then seen by his primary care provider where he had normal hepatic panel and lipase, told to take H2 blocker, but this lessened his symptoms however they have persisted.    Now states he has some degree of discomfort on a daily basis.  The nature of the pain can change.  Does not seem to be worse in any certain time a day.  Has difficulty describing the sensation and states it can occasionally \"feel like a balloon\", like a mechanical click, or focal \"heartburn \".  This can sometimes be accompanied by increased gas, bloating.  Occasionally reports acid in his mouth, is not always tied to the pain.  Otherwise denies typical GERD symptoms.  States he can feel this discomfort both when he is feeling " hungry, and after eating.  Denies early satiety, nausea, vomiting.  Occasionally the pain will move to the left side of his abdomen.  Pain is not worsened with movement, touching the area.    Is having soft daily bowel movements, occasionally will have a loose stool.      ROS:    No fevers or chills  No weight loss, slightly increase   No blurry vision, double vision or change in vision  + sore throat, lymphadenopathy- with seasonal allergies   No headache, paraesthesias, or weakness in a limb  + raynaud's on fingers   No shortness of breath or wheezing  No chest pain or pressure  No arthralgias or myalgias   No rashes or skin changes + exzema   No odynophagia or dysphagia  No BRBPR, hematochezia, melena  No dysuria, frequency or urgency  No hot/cold intolerance or polyria  + anxiety    PROBLEM LIST  Patient Active Problem List    Diagnosis Date Noted     Exercise-induced bronchospasm 02/27/2018     Priority: Medium     Hypertension 01/06/2015     Priority: Medium     Seasonal allergic rhinitis 06/17/2011     Priority: Medium     Viral warts 04/03/2008     Priority: Medium     Problem list name updated by automated process. Provider to review         PERTINENT PAST MEDICAL HISTORY:  Past Medical History:   Diagnosis Date     Hypertension 2017    Genetic, father     NO ACTIVE PROBLEMS allergies     Skin disease 2017/18    Eczema     Stomach problems 6/2019    What I'm being seen for     Uncomplicated asthma 2015    Sports-induced     Urinary tract infection 12/2017       PREVIOUS SURGERIES:  Past Surgical History:   Procedure Laterality Date     ABDOMEN SURGERY  2003    Appendectomy     C APPENDECTOMY  1/04     SURGICAL HISTORY OF -   7/03    right knee arthoscopy       PREVIOUS ENDOSCOPY:  None     ALLERGIES:     Allergies   Allergen Reactions     Penicillins        PERTINENT MEDICATIONS:    Current Outpatient Medications:      albuterol (PROAIR HFA/PROVENTIL HFA/VENTOLIN HFA) 108 (90 Base) MCG/ACT inhaler, Inhale 2  puffs into the lungs every 6 hours as needed for shortness of breath / dyspnea or wheezing, Disp: 6.7 g, Rfl: 11     Cetirizine HCl (ZYRTEC ALLERGY PO), Take by mouth daily, Disp: , Rfl:      fluticasone (FLONASE) 50 MCG/ACT nasal spray, SHAKE LIQUID AND USE 1 TO 2 SPRAYS IN EACH NOSTRIL DAILY, Disp: 48 mL, Rfl: 0     LANsoprazole (PREVACID) 30 MG DR capsule, Take 1 capsule (30 mg) by mouth daily, Disp: 30 capsule, Rfl: 0     lisinopril-hydrochlorothiazide (PRINZIDE/ZESTORETIC) 20-12.5 MG tablet, Take 1 tablet by mouth daily, Disp: 90 tablet, Rfl: 3     UNABLE TO FIND, MEDICATION NAME: topical steroid, Disp: , Rfl:     SOCIAL HISTORY:  Alcohol- on average 2 beers a night, (up to 3-4 at the most), no drugs  Social History     Socioeconomic History     Marital status:      Spouse name: Not on file     Number of children: Not on file     Years of education: Not on file     Highest education level: Not on file   Occupational History     Occupation: .     Comment: [employer]   Social Needs     Financial resource strain: Not on file     Food insecurity:     Worry: Not on file     Inability: Not on file     Transportation needs:     Medical: Not on file     Non-medical: Not on file   Tobacco Use     Smoking status: Never Smoker     Smokeless tobacco: Never Used   Substance and Sexual Activity     Alcohol use: Yes     Alcohol/week: 1.7 standard drinks     Comment: occ.     Drug use: No     Sexual activity: Never     Birth control/protection: None   Lifestyle     Physical activity:     Days per week: Not on file     Minutes per session: Not on file     Stress: Not on file   Relationships     Social connections:     Talks on phone: Not on file     Gets together: Not on file     Attends Alevism service: Not on file     Active member of club or organization: Not on file     Attends meetings of clubs or organizations: Not on file     Relationship status: Not on file     Intimate partner violence:     Fear of current  "or ex partner: Not on file     Emotionally abused: Not on file     Physically abused: Not on file     Forced sexual activity: Not on file   Other Topics Concern      Service Not Asked     Blood Transfusions Not Asked     Caffeine Concern Not Asked     Occupational Exposure Not Asked     Hobby Hazards Not Asked     Sleep Concern Not Asked     Stress Concern Not Asked     Weight Concern Not Asked     Special Diet Not Asked     Back Care Not Asked     Exercise Yes     Bike Helmet Not Asked     Seat Belt Yes     Self-Exams Yes     Parent/sibling w/ CABG, MI or angioplasty before 65F 55M? No   Social History Narrative    PC consultant        FAMILY HISTORY:  FH of CRC: none   Mother diagnosed with pancreatic cancer (per patient's brother had genetic marker)  Family History   Problem Relation Age of Onset     Depression Paternal Grandmother         Not 100%     Hypertension Father      Family History Negative Mother      Other Cancer Mother         Pancreatic     Prostate Cancer Maternal Grandfather      Coronary Artery Disease Paternal Grandfather      Hypertension Paternal Grandfather      Substance Abuse Brother        Past/family/social history reviewed and no changes    PHYSICAL EXAMINATION:  Constitutional: aaox3, cooperative, pleasant, not dyspneic/diaphoretic, no acute distress  Vitals reviewed: BP (!) 142/96   Pulse 67   Ht 1.753 m (5' 9\")   Wt 81.5 kg (179 lb 9.6 oz)   SpO2 99%   BMI 26.52 kg/m     Wt:   Wt Readings from Last 2 Encounters:   10/18/19 81.5 kg (179 lb 9.6 oz)   06/11/19 78.9 kg (174 lb)      Eyes: Sclera anicteric/injected  Ears/nose/mouth/throat: Normal oropharynx without ulcers or exudate, mucus membranes moist, hearing intact  Neck: supple, thyroid normal size  CV: No edema  Respiratory: Unlabored breathing  Lymph: No submandibular, supraclavicular lymphadenopathy  Abd: Nondistended, no hepatosplenomegaly, nontender, no peritoneal signs  Skin: warm, perfused, no jaundice  Psych: " Normal affect  MSK: Normal gait    PERTINENT STUDIES:  Office Visit on 06/11/2019   Component Date Value Ref Range Status     Sodium 06/11/2019 140  133 - 144 mmol/L Final     Potassium 06/11/2019 4.3  3.4 - 5.3 mmol/L Final     Chloride 06/11/2019 105  94 - 109 mmol/L Final     Carbon Dioxide 06/11/2019 26  20 - 32 mmol/L Final     Anion Gap 06/11/2019 9  3 - 14 mmol/L Final     Glucose 06/11/2019 77  70 - 99 mg/dL Final     Urea Nitrogen 06/11/2019 12  7 - 30 mg/dL Final     Creatinine 06/11/2019 1.10  0.66 - 1.25 mg/dL Final     GFR Estimate 06/11/2019 83  >60 mL/min/[1.73_m2] Final     GFR Estimate If Black 06/11/2019 >90  >60 mL/min/[1.73_m2] Final     Calcium 06/11/2019 9.7  8.5 - 10.1 mg/dL Final     Bilirubin Total 06/11/2019 0.8  0.2 - 1.3 mg/dL Final     Albumin 06/11/2019 4.1  3.4 - 5.0 g/dL Final     Protein Total 06/11/2019 7.7  6.8 - 8.8 g/dL Final     Alkaline Phosphatase 06/11/2019 58  40 - 150 U/L Final     ALT 06/11/2019 25  0 - 70 U/L Final     AST 06/11/2019 20  0 - 45 U/L Final     Lipase 06/11/2019 122  73 - 393 U/L Final     WBC 06/11/2019 3.5* 4.0 - 11.0 10e9/L Final     RBC Count 06/11/2019 4.61  4.4 - 5.9 10e12/L Final     Hemoglobin 06/11/2019 14.7  13.3 - 17.7 g/dL Final     Hematocrit 06/11/2019 42.8  40.0 - 53.0 % Final     MCV 06/11/2019 93  78 - 100 fl Final     MCH 06/11/2019 31.9  26.5 - 33.0 pg Final     MCHC 06/11/2019 34.3  31.5 - 36.5 g/dL Final     RDW 06/11/2019 12.1  10.0 - 15.0 % Final     Platelet Count 06/11/2019 209  150 - 450 10e9/L Final       Again, thank you for allowing me to participate in the care of your patient.      Sincerely,    Emma Pack PA-C

## 2019-10-18 NOTE — PATIENT INSTRUCTIONS
It was a pleasure taking care of you today.  I've included a brief summary of our discussion and care plan from today's visit below.  Please review this information with your primary care provider.  ______________________________________________________________________    My recommendations are summarized as follows:    -- plan for MRI- To schedule imaging, please call [phone]     -- take omeprazole 40 mg on an empty stomach 30-60 minutes before eating or drinking     -- will plan for EGD- they will call to schedule     -- Agree with plan with meet with genetic counseling     GERD Lifestyle Modifications  -- Avoid foods high in fat or high fructose corn syrup  -- do not eat within three hours of laying down or going to bed  -- raise the head of your bed 6-8 inches  -- avoid alcohol    Return to GI Clinic in 2 months (after EGD and MRCP) to review your progress.    ______________________________________________________________________    Who do I call with any questions after my visit?  Please be in touch if there are any further questions that arise following today's visit.  There are multiple ways to contact your gastroenterology care team.        During business hours, you may reach a Gastroenterology nurse at [phone]      To schedule or reschedule an appointment, please call [phone].       You can always send a secure message through Fiix.  Fiix messages are answered by your nurse or doctor typically within 24 hours.  Please allow extra time on weekends and holidays.        For urgent/emergent questions after business hours, you may reach the on-call GI Fellow by contacting the [facility] at [phone].     How will I get the results of any tests ordered?    You will receive all of your results.  If you have signed up for Fiix, any tests ordered at your visit will be available to you after your physician reviews them.  Typically this takes 1-2 weeks.  If there are  urgent results that require a change in your care plan, your physician or nurse will call you to discuss the next steps.      What is Shuamehart?  Ocapo is a secure way for you to access all of your healthcare records from the [facility].  It is a web based computer program, so you can sign on to it from any location.  It also allows you to send secure messages to your care team.  I recommend signing up for Ocapo access if you have not already done so and are comfortable with using a computer.      How to I schedule a follow-up visit?  If you did not schedule a follow-up visit today, please call [phone] to schedule a follow-up office visit.        Sincerely,    Emma Pack PA-C  Division of Gastroenterology, Hepatology & Nutrition  [facility]

## 2019-10-30 ENCOUNTER — ANCILLARY PROCEDURE (OUTPATIENT)
Dept: MRI IMAGING | Facility: CLINIC | Age: 39
End: 2019-10-30
Attending: PHYSICIAN ASSISTANT
Payer: COMMERCIAL

## 2019-10-30 DIAGNOSIS — R10.11 RUQ ABDOMINAL PAIN: ICD-10-CM

## 2019-10-30 DIAGNOSIS — R10.13 EPIGASTRIC PAIN: ICD-10-CM

## 2019-10-30 RX ORDER — GADOBUTROL 604.72 MG/ML
7.5 INJECTION INTRAVENOUS ONCE
Status: COMPLETED | OUTPATIENT
Start: 2019-10-30 | End: 2019-10-30

## 2019-10-30 RX ADMIN — GADOBUTROL 7.5 ML: 604.72 INJECTION INTRAVENOUS at 16:22

## 2019-10-30 NOTE — DISCHARGE INSTRUCTIONS
MRI Contrast Discharge Instructions    The IV contrast you received today will pass out of your body in your  urine. This will happen in the next 24 hours. You will not feel this process.  Your urine will not change color.    Drink at least 4 extra glasses of water or juice today (unless your doctor  has restricted your fluids). This reduces the stress on your kidneys.  You may take your regular medicines.    If you are on dialysis: It is best to have dialysis today.    If you have a reaction: Most reactions happen right away. If you have  any new symptoms after leaving the hospital (such as hives or swelling),  call your hospital at the correct number below. Or call your family doctor.  If you have breathing distress or wheezing, call 911.    Special instructions: ***    I have read and understand the above information.    Signature:______________________________________ Date:___________    Staff:__________________________________________ Date:___________     Time:__________    [facility] Departments:    ___[place]: [phone]  ___[facility]: [phone]  ___[place]: [phone] ___[facility]: [phone]  ___[facility]: [phone]  ___[facility]: [phone]  ___[phone]  ___[facility]: [phone]  ___[phone]

## 2019-10-31 ENCOUNTER — TELEPHONE (OUTPATIENT)
Dept: GASTROENTEROLOGY | Facility: CLINIC | Age: 39
End: 2019-10-31

## 2019-10-31 ENCOUNTER — MYC MEDICAL ADVICE (OUTPATIENT)
Dept: GASTROENTEROLOGY | Facility: CLINIC | Age: 39
End: 2019-10-31

## 2019-10-31 NOTE — TELEPHONE ENCOUNTER
Patient Name: Greg Tripp   : 1980  MRN: [id number]       : [x] N/A     VM et MyChart with information needed to complete pre-assessment call.  Request pt contact Endoscopy Pre-assessment RN to complete upcoming procedure information.  . Telephone call-back number provided.      Instructions resent via Bonanzahart  - This includes Request to contact Pre-Assessment RN et information that may be complete by VM if necessary, EGD  instructions, ASC /MAC Instructions, procedure date/time/location/provider.      Katelin Palacios, RN, RN  [facility] Endoscopy    Additional Information regarding appointment:      Patient scheduled for:  [x] EGD      Indication for procedure.   [x] Abdominal pain, right upper quadrant    Sedation Type:   [x] MAC       Procedure Provider:  [place]      Referring Provider. Emma Pack; Eloy Gillespie    Arrival time verified: .1145    Facility location verified:   [x] [facility], 5th floor   [facility] OR - [address] [facility], 3rd Floor Surgery check-in      Prep Type:   [x]NPO /p 0400, No solid food /p 2200 the night before    Anticoagulants or blood thinners: [x]None                Electronic implanted devices: [x] No      H&P / Pre op physical completed: [x] N/A    Additional Information: Bx. H-pylori  _______________________________________________

## 2019-11-01 ENCOUNTER — TELEPHONE (OUTPATIENT)
Dept: GASTROENTEROLOGY | Facility: CLINIC | Age: 39
End: 2019-11-01

## 2019-11-01 ENCOUNTER — PRE VISIT (OUTPATIENT)
Dept: ONCOLOGY | Facility: CLINIC | Age: 39
End: 2019-11-01

## 2019-11-01 NOTE — TELEPHONE ENCOUNTER
Patient called in response to MyChart Message, instructed him to check MyChart.    Emma Pack PA-C  Division of Gastroenterology, Hepatology & Nutrition  [facility]

## 2019-11-04 NOTE — TELEPHONE ENCOUNTER
Patient called in response to [employer] Message.  Discussed that results from MRCP do not show any pancreas abnormality or other concerning findings which may explain symptoms.  Discussed that pain may be functional, musculoskeletal, or related to possible GI condition such as H. pylori, celiac disease, gastritis, or esophagitis.  Discussed with patient that if h wishes to defer endoscopy, could talk about other tests or medication options.  Patient wishes to undergo upper endoscopy as scheduled on Wednesday this week.  He has not heard from our scheduling team, he will nurse to discuss this further.    Emma Pack PA-C  Division of Gastroenterology, Hepatology & Nutrition  [facility]

## 2019-11-05 ENCOUNTER — ANESTHESIA EVENT (OUTPATIENT)
Dept: SURGERY | Facility: AMBULATORY SURGERY CENTER | Age: 39
End: 2019-11-05

## 2019-11-06 ENCOUNTER — HOSPITAL ENCOUNTER (OUTPATIENT)
Facility: AMBULATORY SURGERY CENTER | Age: 39
End: 2019-11-06
Attending: INTERNAL MEDICINE
Payer: COMMERCIAL

## 2019-11-06 ENCOUNTER — ANESTHESIA (OUTPATIENT)
Dept: SURGERY | Facility: AMBULATORY SURGERY CENTER | Age: 39
End: 2019-11-06

## 2019-11-06 VITALS
BODY MASS INDEX: 26.6 KG/M2 | SYSTOLIC BLOOD PRESSURE: 115 MMHG | RESPIRATION RATE: 16 BRPM | HEIGHT: 69 IN | OXYGEN SATURATION: 98 % | DIASTOLIC BLOOD PRESSURE: 75 MMHG | WEIGHT: 179.6 LBS | TEMPERATURE: 97.7 F | HEART RATE: 65 BPM

## 2019-11-06 DIAGNOSIS — R10.13 EPIGASTRIC PAIN: Primary | ICD-10-CM

## 2019-11-06 LAB — UPPER GI ENDOSCOPY: NORMAL

## 2019-11-06 RX ORDER — FENTANYL CITRATE 50 UG/ML
25-50 INJECTION, SOLUTION INTRAMUSCULAR; INTRAVENOUS
Status: DISCONTINUED | OUTPATIENT
Start: 2019-11-06 | End: 2019-11-07 | Stop reason: HOSPADM

## 2019-11-06 RX ORDER — ONDANSETRON 2 MG/ML
4 INJECTION INTRAMUSCULAR; INTRAVENOUS
Status: DISCONTINUED | OUTPATIENT
Start: 2019-11-06 | End: 2019-11-07 | Stop reason: HOSPADM

## 2019-11-06 RX ORDER — NALOXONE HYDROCHLORIDE 0.4 MG/ML
.1-.4 INJECTION, SOLUTION INTRAMUSCULAR; INTRAVENOUS; SUBCUTANEOUS
Status: DISCONTINUED | OUTPATIENT
Start: 2019-11-06 | End: 2019-11-07 | Stop reason: HOSPADM

## 2019-11-06 RX ORDER — LIDOCAINE 40 MG/G
CREAM TOPICAL
Status: DISCONTINUED | OUTPATIENT
Start: 2019-11-06 | End: 2019-11-07 | Stop reason: HOSPADM

## 2019-11-06 RX ORDER — OXYCODONE HYDROCHLORIDE 5 MG/1
5 TABLET ORAL EVERY 4 HOURS PRN
Status: DISCONTINUED | OUTPATIENT
Start: 2019-11-06 | End: 2019-11-07 | Stop reason: HOSPADM

## 2019-11-06 RX ORDER — ONDANSETRON 4 MG/1
4 TABLET, ORALLY DISINTEGRATING ORAL EVERY 30 MIN PRN
Status: DISCONTINUED | OUTPATIENT
Start: 2019-11-06 | End: 2019-11-07 | Stop reason: HOSPADM

## 2019-11-06 RX ORDER — ACETAMINOPHEN 325 MG/1
975 TABLET ORAL ONCE
Status: DISCONTINUED | OUTPATIENT
Start: 2019-11-06 | End: 2019-11-07 | Stop reason: HOSPADM

## 2019-11-06 RX ORDER — ONDANSETRON 2 MG/ML
4 INJECTION INTRAMUSCULAR; INTRAVENOUS EVERY 30 MIN PRN
Status: DISCONTINUED | OUTPATIENT
Start: 2019-11-06 | End: 2019-11-07 | Stop reason: HOSPADM

## 2019-11-06 RX ORDER — SODIUM CHLORIDE, SODIUM LACTATE, POTASSIUM CHLORIDE, CALCIUM CHLORIDE 600; 310; 30; 20 MG/100ML; MG/100ML; MG/100ML; MG/100ML
INJECTION, SOLUTION INTRAVENOUS CONTINUOUS
Status: DISCONTINUED | OUTPATIENT
Start: 2019-11-06 | End: 2019-11-07 | Stop reason: HOSPADM

## 2019-11-06 RX ORDER — FENTANYL CITRATE 50 UG/ML
INJECTION, SOLUTION INTRAMUSCULAR; INTRAVENOUS PRN
Status: DISCONTINUED | OUTPATIENT
Start: 2019-11-06 | End: 2019-11-06 | Stop reason: HOSPADM

## 2019-11-06 RX ORDER — MEPERIDINE HYDROCHLORIDE 25 MG/ML
12.5 INJECTION INTRAMUSCULAR; INTRAVENOUS; SUBCUTANEOUS
Status: DISCONTINUED | OUTPATIENT
Start: 2019-11-06 | End: 2019-11-07 | Stop reason: HOSPADM

## 2019-11-06 RX ORDER — GABAPENTIN 300 MG/1
300 CAPSULE ORAL ONCE
Status: DISCONTINUED | OUTPATIENT
Start: 2019-11-06 | End: 2019-11-07 | Stop reason: HOSPADM

## 2019-11-06 ASSESSMENT — MIFFLIN-ST. JEOR: SCORE: 1715.29

## 2020-03-13 ENCOUNTER — OFFICE VISIT (OUTPATIENT)
Dept: FAMILY MEDICINE | Facility: CLINIC | Age: 40
End: 2020-03-13
Payer: COMMERCIAL

## 2020-03-13 VITALS
SYSTOLIC BLOOD PRESSURE: 122 MMHG | HEART RATE: 57 BPM | HEIGHT: 69 IN | TEMPERATURE: 97.5 F | BODY MASS INDEX: 26.07 KG/M2 | DIASTOLIC BLOOD PRESSURE: 88 MMHG | RESPIRATION RATE: 16 BRPM | OXYGEN SATURATION: 98 % | WEIGHT: 176 LBS

## 2020-03-13 DIAGNOSIS — I10 ESSENTIAL HYPERTENSION: ICD-10-CM

## 2020-03-13 DIAGNOSIS — Z00.00 ROUTINE GENERAL MEDICAL EXAMINATION AT A HEALTH CARE FACILITY: Primary | ICD-10-CM

## 2020-03-13 LAB
ALBUMIN SERPL-MCNC: 3.8 G/DL (ref 3.4–5)
ALP SERPL-CCNC: 76 U/L (ref 40–150)
ALT SERPL W P-5'-P-CCNC: 24 U/L (ref 0–70)
ANION GAP SERPL CALCULATED.3IONS-SCNC: 2 MMOL/L (ref 3–14)
AST SERPL W P-5'-P-CCNC: 14 U/L (ref 0–45)
BASOPHILS # BLD AUTO: 0 10E9/L (ref 0–0.2)
BASOPHILS NFR BLD AUTO: 0.6 %
BILIRUB SERPL-MCNC: 0.5 MG/DL (ref 0.2–1.3)
BUN SERPL-MCNC: 17 MG/DL (ref 7–30)
CALCIUM SERPL-MCNC: 9.2 MG/DL (ref 8.5–10.1)
CHLORIDE SERPL-SCNC: 105 MMOL/L (ref 94–109)
CHOLEST SERPL-MCNC: 177 MG/DL
CO2 SERPL-SCNC: 30 MMOL/L (ref 20–32)
CREAT SERPL-MCNC: 0.92 MG/DL (ref 0.66–1.25)
DIFFERENTIAL METHOD BLD: NORMAL
EOSINOPHIL # BLD AUTO: 0.3 10E9/L (ref 0–0.7)
EOSINOPHIL NFR BLD AUTO: 6 %
ERYTHROCYTE [DISTWIDTH] IN BLOOD BY AUTOMATED COUNT: 12 % (ref 10–15)
GFR SERPL CREATININE-BSD FRML MDRD: >90 ML/MIN/{1.73_M2}
GLUCOSE SERPL-MCNC: 92 MG/DL (ref 70–99)
HCT VFR BLD AUTO: 44.6 % (ref 40–53)
HDLC SERPL-MCNC: 59 MG/DL
HGB BLD-MCNC: 14.9 G/DL (ref 13.3–17.7)
LDLC SERPL CALC-MCNC: 109 MG/DL
LYMPHOCYTES # BLD AUTO: 1.5 10E9/L (ref 0.8–5.3)
LYMPHOCYTES NFR BLD AUTO: 30.1 %
MCH RBC QN AUTO: 31 PG (ref 26.5–33)
MCHC RBC AUTO-ENTMCNC: 33.4 G/DL (ref 31.5–36.5)
MCV RBC AUTO: 93 FL (ref 78–100)
MONOCYTES # BLD AUTO: 0.4 10E9/L (ref 0–1.3)
MONOCYTES NFR BLD AUTO: 9.1 %
NEUTROPHILS # BLD AUTO: 2.6 10E9/L (ref 1.6–8.3)
NEUTROPHILS NFR BLD AUTO: 54.2 %
NONHDLC SERPL-MCNC: 118 MG/DL
PLATELET # BLD AUTO: 239 10E9/L (ref 150–450)
POTASSIUM SERPL-SCNC: 4.6 MMOL/L (ref 3.4–5.3)
PROT SERPL-MCNC: 7.9 G/DL (ref 6.8–8.8)
RBC # BLD AUTO: 4.81 10E12/L (ref 4.4–5.9)
SODIUM SERPL-SCNC: 137 MMOL/L (ref 133–144)
TRIGL SERPL-MCNC: 47 MG/DL
WBC # BLD AUTO: 4.9 10E9/L (ref 4–11)

## 2020-03-13 PROCEDURE — 80053 COMPREHEN METABOLIC PANEL: CPT | Performed by: PHYSICIAN ASSISTANT

## 2020-03-13 PROCEDURE — 99396 PREV VISIT EST AGE 40-64: CPT | Performed by: PHYSICIAN ASSISTANT

## 2020-03-13 PROCEDURE — 36415 COLL VENOUS BLD VENIPUNCTURE: CPT | Performed by: PHYSICIAN ASSISTANT

## 2020-03-13 PROCEDURE — 85025 COMPLETE CBC W/AUTO DIFF WBC: CPT | Performed by: PHYSICIAN ASSISTANT

## 2020-03-13 PROCEDURE — 80061 LIPID PANEL: CPT | Performed by: PHYSICIAN ASSISTANT

## 2020-03-13 RX ORDER — LISINOPRIL AND HYDROCHLOROTHIAZIDE 12.5; 2 MG/1; MG/1
1 TABLET ORAL DAILY
Qty: 90 TABLET | Refills: 3 | Status: SHIPPED | OUTPATIENT
Start: 2020-03-13 | End: 2021-03-15

## 2020-03-13 ASSESSMENT — MIFFLIN-ST. JEOR: SCORE: 1699.18

## 2020-03-13 NOTE — LETTER
My Asthma Action Plan    Name: Greg Tripp   YOB: 1980  Date: 3/13/2020   My doctor: Luigi Brown PA-C   My clinic: [facility]        My Rescue Medicine:   Albuterol inhaler (Proair/Ventolin/Proventil HFA)  2-4 puffs EVERY 4 HOURS as needed. Use a spacer if recommended by your provider.   My Asthma Severity:   Intermittent / Exercise Induced  Know your asthma triggers: N/A             GREEN ZONE   Good Control    I feel good    No cough or wheeze    Can work, sleep and play without asthma symptoms       Take your asthma control medicine every day.     1. If exercise triggers your asthma, take your rescue medication    15 minutes before exercise or sports, and    During exercise if you have asthma symptoms  2. Spacer to use with inhaler: If you have a spacer, make sure to use it with your inhaler             YELLOW ZONE Getting Worse  I have ANY of these:    I do not feel good    Cough or wheeze    Chest feels tight    Wake up at night   1. Keep taking your Green Zone medications  2. Start taking your rescue medicine:    every 20 minutes for up to 1 hour. Then every 4 hours for 24-48 hours.  3. If you stay in the Yellow Zone for more than 12-24 hours, contact your doctor.  4. If you do not return to the Green Zone in 12-24 hours or you get worse, start taking your oral steroid medicine if prescribed by your provider.           RED ZONE Medical Alert - Get Help  I have ANY of these:    I feel awful    Medicine is not helping    Breathing getting harder    Trouble walking or talking    Nose opens wide to breathe       1. Take your rescue medicine NOW  2. If your provider has prescribed an oral steroid medicine, start taking it NOW  3. Call your doctor NOW  4. If you are still in the Red Zone after 20 minutes and you have not reached your doctor:    Take your rescue medicine again and    Call 911 or go to the emergency room right away    See your regular doctor within 2  weeks of an Emergency Room or Urgent Care visit for follow-up treatment.          Annual Reminders:  Meet with Asthma Educator,  Flu Shot in the Fall, consider Pneumonia Vaccination for patients with asthma (aged 19 and older).    Pharmacy: [id number] - [place], [place] - [address] AT [facility] [address]    Electronically signed by Luigi Brown PA-C   Date: 03/13/20                    Asthma Triggers  How To Control Things That Make Your Asthma Worse    Triggers are things that make your asthma worse.  Look at the list below to help you find your triggers and   what you can do about them. You can help prevent asthma flare-ups by staying away from your triggers.      Trigger                                                          What you can do   Cigarette Smoke  Tobacco smoke can make asthma worse. Do not allow smoking in your home, car or around you.  Be sure no one smokes at a child s day care or school.  If you smoke, ask your health care provider for ways to help you quit.  Ask family members to quit too.  Ask your health care provider for a referral to Quit Plan to help you quit smoking, or call 3-276-440-PLAN.     Colds, Flu, Bronchitis  These are common triggers of asthma. Wash your hands often.  Don t touch your eyes, nose or mouth.  Get a flu shot every year.     Dust Mites  These are tiny bugs that live in cloth or carpet. They are too small to see. Wash sheets and blankets in hot water every week.   Encase pillows and mattress in dust mite proof covers.  Avoid having carpet if you can. If you have carpet, vacuum weekly.   Use a dust mask and HEPA vacuum.   Pollen and Outdoor Mold  Some people are allergic to trees, grass, or weed pollen, or molds. Try to keep your windows closed.  Limit time out doors when pollen count is high.   Ask you health care provider about taking medicine during allergy season.     Animal Dander  Some people are allergic to skin flakes, urine or  saliva from pets with fur or feathers. Keep pets with fur or feathers out of your home.    If you can t keep the pet outdoors, then keep the pet out of your bedroom.  Keep the bedroom door closed.  Keep pets off cloth furniture and away from stuffed toys.     Mice, Rats, and Cockroaches  Some people are allergic to the waste from these pests.   Cover food and garbage.  Clean up spills and food crumbs.  Store grease in the refrigerator.   Keep food out of the bedroom.   Indoor Mold  This can be a trigger if your home has high moisture. Fix leaking faucets, pipes, or other sources of water.   Clean moldy surfaces.  Dehumidify basement if it is damp and smelly.   Smoke, Strong Odors, and Sprays  These can reduce air quality. Stay away from strong odors and sprays, such as perfume, powder, hair spray, paints, smoke incense, paint, cleaning products, candles and new carpet.   Exercise or Sports  Some people with asthma have this trigger. Be active!  Ask your doctor about taking medicine before sports or exercise to prevent symptoms.    Warm up for 5-10 minutes before and after sports or exercise.     Other Triggers of Asthma  Cold air:  Cover your nose and mouth with a scarf.  Sometimes laughing or crying can be a trigger.  Some medicines and food can trigger asthma.

## 2020-03-13 NOTE — PROGRESS NOTES
SUBJECTIVE:   CC: Greg Tripp is an 39 year old male who presents for preventative health visit.     Healthy Habits:     Getting at least 3 servings of Calcium per day:  Yes    Bi-annual eye exam:  Yes    Dental care twice a year:  Yes    Sleep apnea or symptoms of sleep apnea:  None    Diet:  Regular (no restrictions)    Frequency of exercise:  2-3 days/week    Duration of exercise:  N/A    Taking medications regularly:  Yes    Medication side effects:  None    PHQ-2 Total Score: 0    Additional concerns today:  No      PMH:   Seasonal allergies - zyrtec   Eczema - cortisone cream PRN   GERD - not currently taking medication   Sports induced asthma/bronchoconstriction - albuterol before exercise   Hypertension - lisinorpil-hydrochlorothiazide 20-12.5    Family:  Father's side hypertension   Mother - pancreatic cancer -  2019  Maternal grandfather - prostate cancer     Social:   - wife   Two children - ages 10 and 12 years   Enjoys playing soccer - usually plays midfield position   runs [employer]   Works from home       Today's PHQ-2 Score:   PHQ-2 (  [employer]) 3/13/2020   Q1: Little interest or pleasure in doing things 0   Q2: Feeling down, depressed or hopeless 0   PHQ-2 Score 0   Q1: Little interest or pleasure in doing things Not at all   Q2: Feeling down, depressed or hopeless Not at all   PHQ-2 Score 0       Abuse: Current or Past(Physical, Sexual or Emotional)- No  Do you feel safe in your environment? Yes        Social History     Tobacco Use     Smoking status: Never Smoker     Smokeless tobacco: Never Used   Substance Use Topics     Alcohol use: Yes     Alcohol/week: 1.7 standard drinks     Comment: occ.     If you drink alcohol do you typically have >3 drinks per day or >7 drinks per week? No    Alcohol Use 3/13/2020   Prescreen: >3 drinks/day or >7 drinks/week? Not Applicable   Prescreen: >3 drinks/day or >7 drinks/week? -   No flowsheet  "data found.    Last PSA: No results found for: PSA    Reviewed orders with patient. Reviewed health maintenance and updated orders accordingly - Yes    Reviewed and updated as needed this visit by clinical staff         Reviewed and updated as needed this visit by Provider          Review of Systems  CONSTITUTIONAL: NEGATIVE for fever, chills, change in weight  INTEGUMENTARY/SKIN: NEGATIVE for worrisome rashes, moles or lesions  EYES: NEGATIVE for vision changes or irritation  ENT: NEGATIVE for ear, mouth and throat problems  RESP: NEGATIVE for significant cough or SOB  CV: NEGATIVE for chest pain, palpitations or peripheral edema  GI: NEGATIVE for nausea, abdominal pain, heartburn, or change in bowel habits   male: negative for dysuria, hematuria, decreased urinary stream, erectile dysfunction, urethral discharge  MUSCULOSKELETAL: NEGATIVE for significant arthralgias or myalgia  NEURO: NEGATIVE for weakness, dizziness or paresthesias  PSYCHIATRIC: NEGATIVE for changes in mood or affect    OBJECTIVE:   /88 (BP Location: Right arm, Patient Position: Sitting, Cuff Size: Adult Regular)   Pulse 57   Temp 97.5  F (36.4  C) (Tympanic)   Resp 16   Ht 1.753 m (5' 9.03\")   Wt 79.8 kg (176 lb)   SpO2 98%   BMI 25.97 kg/m      Physical Exam  GENERAL: healthy, alert and no distress  EYES: Eyes grossly normal to inspection, PERRL and conjunctivae and sclerae normal  HENT: ear canals and TM's normal, nose and mouth without ulcers or lesions  NECK: no adenopathy, no asymmetry, masses, or scars and thyroid normal to palpation  RESP: lungs clear to auscultation - no rales, rhonchi or wheezes  CV: regular rate and rhythm, normal S1 S2, no S3 or S4, no murmur, click or rub, no peripheral edema and peripheral pulses strong  ABDOMEN: soft, nontender, no hepatosplenomegaly, no masses and bowel sounds normal  MS: no gross musculoskeletal defects noted, no edema  SKIN: no suspicious lesions or rashes  NEURO: Normal strength " "and tone, mentation intact and speech normal  PSYCH: mentation appears normal, affect normal/bright    Diagnostic Test Results:  Labs reviewed in Epic  Results for orders placed or performed in visit on 03/13/20 (from the past 24 hour(s))   CBC with platelets and differential   Result Value Ref Range    WBC 4.9 4.0 - 11.0 10e9/L    RBC Count 4.81 4.4 - 5.9 10e12/L    Hemoglobin 14.9 13.3 - 17.7 g/dL    Hematocrit 44.6 40.0 - 53.0 %    MCV 93 78 - 100 fl    MCH 31.0 26.5 - 33.0 pg    MCHC 33.4 31.5 - 36.5 g/dL    RDW 12.0 10.0 - 15.0 %    Platelet Count 239 150 - 450 10e9/L    Diff Method Automated Method     % Neutrophils 54.2 %    % Lymphocytes 30.1 %    % Monocytes 9.1 %    % Eosinophils 6.0 %    % Basophils 0.6 %    Absolute Neutrophil 2.6 1.6 - 8.3 10e9/L    Absolute Lymphocytes 1.5 0.8 - 5.3 10e9/L    Absolute Monocytes 0.4 0.0 - 1.3 10e9/L    Absolute Eosinophils 0.3 0.0 - 0.7 10e9/L    Absolute Basophils 0.0 0.0 - 0.2 10e9/L       ASSESSMENT/PLAN:   1. Routine general medical examination at a health care facility  - Comprehensive metabolic panel (BMP + Alb, Alk Phos, ALT, AST, Total. Bili, TP)  - Lipid panel reflex to direct LDL Non-fasting  - CBC with platelets and differential    2. Essential hypertension  - Stable, no dose adjustments necessary today   - Comprehensive metabolic panel (BMP + Alb, Alk Phos, ALT, AST, Total. Bili, TP)  - lisinopril-hydrochlorothiazide (ZESTORETIC) 20-12.5 MG tablet; Take 1 tablet by mouth daily  Dispense: 90 tablet; Refill: 3    COUNSELING:   Reviewed preventive health counseling, as reflected in patient instructions       Regular exercise       Healthy diet/nutrition       Immunizations    Declined: Influenza due to Other late in the flu season, will get next year             Estimated body mass index is 26.51 kg/m  as calculated from the following:    Height as of 11/6/19: 1.753 m (5' 9.02\").    Weight as of 11/6/19: 81.5 kg (179 lb 9.6 oz).     Weight management plan: " Discussed healthy diet and exercise guidelines     reports that he has never smoked. He has never used smokeless tobacco.      Counseling Resources:  ATP IV Guidelines  Pooled Cohorts Equation Calculator  FRAX Risk Assessment  ICSI Preventive Guidelines  Dietary Guidelines for Americans, 2010  USDA's MyPlate  ASA Prophylaxis  Lung CA Screening    Luigi Brown PA-C  [facility]

## 2020-03-14 ASSESSMENT — ASTHMA QUESTIONNAIRES: ACT_TOTALSCORE: 25

## 2020-09-16 DIAGNOSIS — J30.2 SEASONAL ALLERGIC RHINITIS: ICD-10-CM

## 2020-09-16 RX ORDER — FLUTICASONE PROPIONATE 50 MCG
SPRAY, SUSPENSION (ML) NASAL
Qty: 48 G | Refills: 0 | Status: SHIPPED | OUTPATIENT
Start: 2020-09-16 | End: 2021-03-15

## 2021-01-09 ENCOUNTER — HEALTH MAINTENANCE LETTER (OUTPATIENT)
Age: 41
End: 2021-01-09

## 2021-03-14 ASSESSMENT — ENCOUNTER SYMPTOMS
NERVOUS/ANXIOUS: 0
MYALGIAS: 0
NAUSEA: 0
HEARTBURN: 0
HEADACHES: 0
FEVER: 0
DYSURIA: 0
HEMATURIA: 0
ABDOMINAL PAIN: 0
CHILLS: 0
FREQUENCY: 0
PALPITATIONS: 0
SHORTNESS OF BREATH: 0
DIARRHEA: 0
PARESTHESIAS: 0
ARTHRALGIAS: 0
JOINT SWELLING: 0
EYE PAIN: 0
WEAKNESS: 0
CONSTIPATION: 0
DIZZINESS: 0
HEMATOCHEZIA: 0
COUGH: 0
SORE THROAT: 0

## 2021-03-15 ENCOUNTER — OFFICE VISIT (OUTPATIENT)
Dept: FAMILY MEDICINE | Facility: CLINIC | Age: 41
End: 2021-03-15
Payer: COMMERCIAL

## 2021-03-15 VITALS
SYSTOLIC BLOOD PRESSURE: 126 MMHG | TEMPERATURE: 98.5 F | DIASTOLIC BLOOD PRESSURE: 88 MMHG | BODY MASS INDEX: 26.05 KG/M2 | HEIGHT: 70 IN | RESPIRATION RATE: 16 BRPM | WEIGHT: 182 LBS | HEART RATE: 84 BPM | OXYGEN SATURATION: 96 %

## 2021-03-15 DIAGNOSIS — K21.9 GASTROESOPHAGEAL REFLUX DISEASE WITHOUT ESOPHAGITIS: ICD-10-CM

## 2021-03-15 DIAGNOSIS — I10 ESSENTIAL HYPERTENSION: ICD-10-CM

## 2021-03-15 DIAGNOSIS — J45.990 EXERCISE-INDUCED BRONCHOSPASM: ICD-10-CM

## 2021-03-15 DIAGNOSIS — Z00.00 ROUTINE GENERAL MEDICAL EXAMINATION AT A HEALTH CARE FACILITY: Primary | ICD-10-CM

## 2021-03-15 LAB
ALBUMIN SERPL-MCNC: 4 G/DL (ref 3.4–5)
ALP SERPL-CCNC: 74 U/L (ref 40–150)
ALT SERPL W P-5'-P-CCNC: 32 U/L (ref 0–70)
ANION GAP SERPL CALCULATED.3IONS-SCNC: 3 MMOL/L (ref 3–14)
AST SERPL W P-5'-P-CCNC: 19 U/L (ref 0–45)
BILIRUB SERPL-MCNC: 0.6 MG/DL (ref 0.2–1.3)
BUN SERPL-MCNC: 13 MG/DL (ref 7–30)
CALCIUM SERPL-MCNC: 9.1 MG/DL (ref 8.5–10.1)
CHLORIDE SERPL-SCNC: 105 MMOL/L (ref 94–109)
CHOLEST SERPL-MCNC: 192 MG/DL
CO2 SERPL-SCNC: 30 MMOL/L (ref 20–32)
CREAT SERPL-MCNC: 1.01 MG/DL (ref 0.66–1.25)
GFR SERPL CREATININE-BSD FRML MDRD: >90 ML/MIN/{1.73_M2}
GLUCOSE SERPL-MCNC: 94 MG/DL (ref 70–99)
HCV AB SERPL QL IA: NONREACTIVE
HDLC SERPL-MCNC: 59 MG/DL
LDLC SERPL CALC-MCNC: 120 MG/DL
NONHDLC SERPL-MCNC: 133 MG/DL
POTASSIUM SERPL-SCNC: 4 MMOL/L (ref 3.4–5.3)
PROT SERPL-MCNC: 7.8 G/DL (ref 6.8–8.8)
SODIUM SERPL-SCNC: 138 MMOL/L (ref 133–144)
TRIGL SERPL-MCNC: 63 MG/DL

## 2021-03-15 PROCEDURE — 99213 OFFICE O/P EST LOW 20 MIN: CPT | Mod: 25 | Performed by: PHYSICIAN ASSISTANT

## 2021-03-15 PROCEDURE — 36415 COLL VENOUS BLD VENIPUNCTURE: CPT | Performed by: PHYSICIAN ASSISTANT

## 2021-03-15 PROCEDURE — 99396 PREV VISIT EST AGE 40-64: CPT | Performed by: PHYSICIAN ASSISTANT

## 2021-03-15 PROCEDURE — 80061 LIPID PANEL: CPT | Performed by: PHYSICIAN ASSISTANT

## 2021-03-15 PROCEDURE — 80053 COMPREHEN METABOLIC PANEL: CPT | Performed by: PHYSICIAN ASSISTANT

## 2021-03-15 PROCEDURE — 86803 HEPATITIS C AB TEST: CPT | Performed by: PHYSICIAN ASSISTANT

## 2021-03-15 RX ORDER — LISINOPRIL AND HYDROCHLOROTHIAZIDE 12.5; 2 MG/1; MG/1
1 TABLET ORAL DAILY
Qty: 90 TABLET | Refills: 3 | Status: SHIPPED | OUTPATIENT
Start: 2021-03-15 | End: 2022-03-14

## 2021-03-15 RX ORDER — ALBUTEROL SULFATE 90 UG/1
2 AEROSOL, METERED RESPIRATORY (INHALATION) EVERY 6 HOURS PRN
Qty: 6.7 G | Refills: 11 | Status: SHIPPED | OUTPATIENT
Start: 2021-03-15 | End: 2023-05-04

## 2021-03-15 ASSESSMENT — ENCOUNTER SYMPTOMS
HEMATOCHEZIA: 0
WEAKNESS: 0
PALPITATIONS: 0
CONSTIPATION: 0
ABDOMINAL PAIN: 0
HEMATURIA: 0
NERVOUS/ANXIOUS: 0
SORE THROAT: 0
MYALGIAS: 0
PARESTHESIAS: 0
JOINT SWELLING: 0
DIARRHEA: 0
COUGH: 0
DIZZINESS: 0
FREQUENCY: 0
EYE PAIN: 0
ARTHRALGIAS: 0
DYSURIA: 0
FEVER: 0
SHORTNESS OF BREATH: 0
CHILLS: 0
NAUSEA: 0
HEARTBURN: 0
HEADACHES: 0

## 2021-03-15 ASSESSMENT — MIFFLIN-ST. JEOR: SCORE: 1728.86

## 2021-03-15 NOTE — PROGRESS NOTES
SUBJECTIVE:   CC: Greg Tripp is an 40 year old male who presents for preventative health visit.   Patient has been advised of split billing requirements and indicates understanding: Yes  Healthy Habits:     Getting at least 3 servings of Calcium per day:  Yes    Bi-annual eye exam:  NO    Dental care twice a year:  Yes    Sleep apnea or symptoms of sleep apnea:  None    Diet:  Regular (no restrictions)    Taking medications regularly:  Yes    Medication side effects:  None    PHQ-2 Total Score: 0    Additional concerns today:  Yes (follow up for abdominal discomfort)     Hypertension Follow-up    Do you check your blood pressure regularly outside of the clinic? Yes     Are you following a low salt diet? No    Are your blood pressures ever more than 140 on the top number (systolic) OR more   than 90 on the bottom number (diastolic), for example 140/90? No    Acid reflux concerns  Father had same symptoms  Not taking any regular medications to control   Managing with diet / lifestyle     Today's PHQ-2 Score:   PHQ-2 ( 1999 Pfizer) 3/14/2021   Q1: Little interest or pleasure in doing things 0   Q2: Feeling down, depressed or hopeless 0   PHQ-2 Score 0   Q1: Little interest or pleasure in doing things Not at all   Q2: Feeling down, depressed or hopeless Not at all   PHQ-2 Score 0       Abuse: Current or Past(Physical, Sexual or Emotional)- No  Do you feel safe in your environment? Yes    Have you ever done Advance Care Planning? (For example, a Health Directive, POLST, or a discussion with a medical provider or your loved ones about your wishes): No, advance care planning information given to patient to review.  Patient plans to discuss their wishes with loved ones or provider.      Social History     Tobacco Use     Smoking status: Never Smoker     Smokeless tobacco: Never Used   Substance Use Topics     Alcohol use: Yes     Alcohol/week: 1.7 standard drinks     Comment: 7-14 drinks a week      If you drink  alcohol do you typically have >3 drinks per day or >7 drinks per week? Yes-pt has declined to answer AUDIT questions        AUDIT - Alcohol Use Disorders Identification Test - Reproduced from the World Health Organization Audit 2001 (Second Edition) 3/15/2021   1.  How often do you have a drink containing alcohol? 4 or more times a week   2.  How many drinks containing alcohol do you have on a typical day when you are drinking? 1 or 2   3.  How often do you have five or more drinks on one occasion? Never   4.  How often during the last year have you found that you were not able to stop drinking once you had started? Never   5.  How often during the last year have you failed to do what was normally expected of you because of drinking? Never       Last PSA: No results found for: PSA    Reviewed orders with patient. Reviewed health maintenance and updated orders accordingly - Yes      Reviewed and updated as needed this visit by clinical staff  Tobacco  Allergies  Meds   Med Hx  Surg Hx  Fam Hx  Soc Hx        Reviewed and updated as needed this visit by Provider                    Review of Systems   Constitutional: Negative for chills and fever.   HENT: Negative for congestion, ear pain, hearing loss and sore throat.    Eyes: Negative for pain and visual disturbance.   Respiratory: Negative for cough and shortness of breath.    Cardiovascular: Negative for chest pain, palpitations and peripheral edema.   Gastrointestinal: Negative for abdominal pain, constipation, diarrhea, heartburn, hematochezia and nausea.   Genitourinary: Negative for discharge, dysuria, frequency, genital sores, hematuria, impotence and urgency.   Musculoskeletal: Negative for arthralgias, joint swelling and myalgias.   Skin: Negative for rash.   Neurological: Negative for dizziness, weakness, headaches and paresthesias.   Psychiatric/Behavioral: Negative for mood changes. The patient is not nervous/anxious.      OBJECTIVE:   /88 (BP  "Location: Right arm, Patient Position: Sitting, Cuff Size: Adult Large)   Pulse 84   Temp 98.5  F (36.9  C) (Oral)   Resp 16   Ht 1.765 m (5' 9.5\")   Wt 82.6 kg (182 lb)   SpO2 96%   BMI 26.49 kg/m      Physical Exam  GENERAL: healthy, alert and no distress  EYES: Eyes grossly normal to inspection, PERRL and conjunctivae and sclerae normal  HENT: ear canals and TM's normal, nose and mouth without ulcers or lesions  NECK: no adenopathy, no asymmetry, masses, or scars and thyroid normal to palpation  RESP: lungs clear to auscultation - no rales, rhonchi or wheezes  CV: regular rate and rhythm, normal S1 S2, no S3 or S4, no murmur, click or rub, no peripheral edema and peripheral pulses strong  ABDOMEN: soft, nontender, no hepatosplenomegaly, no masses and bowel sounds normal  MS: no gross musculoskeletal defects noted, no edema  SKIN: no suspicious lesions or rashes  NEURO: Normal strength and tone, mentation intact and speech normal  PSYCH: mentation appears normal, affect normal/bright    Diagnostic Test Results:  Labs reviewed in Epic  No results found for this or any previous visit (from the past 24 hour(s)).    ASSESSMENT/PLAN:   Greg was seen today for physical.    Diagnoses and all orders for this visit:    Routine general medical examination at a health care facility  -     Lipid panel reflex to direct LDL Fasting  -     Hepatitis C antibody    Essential hypertension  -     lisinopril-hydrochlorothiazide (ZESTORETIC) 20-12.5 MG tablet; Take 1 tablet by mouth daily  -     Comprehensive metabolic panel (BMP + Alb, Alk Phos, ALT, AST, Total. Bili, TP)    Exercise-induced bronchospasm  -     albuterol (PROAIR HFA/PROVENTIL HFA/VENTOLIN HFA) 108 (90 Base) MCG/ACT inhaler; Inhale 2 puffs into the lungs every 6 hours as needed for shortness of breath / dyspnea or wheezing      Blood pressure well controlled on Zestoretic 20-12.5 mg daily   Increase activity level  Refill of PRN albuterol inhaler   GERD " "symptoms well managed with diet / lifestyle, in future could consider famotidine     COUNSELING:   Reviewed preventive health counseling, as reflected in patient instructions       Regular exercise       Healthy diet/nutrition       Consider Hep C screening for all patients one time for ages 18-79 years    Estimated body mass index is 26.49 kg/m  as calculated from the following:    Height as of this encounter: 1.765 m (5' 9.5\").    Weight as of this encounter: 82.6 kg (182 lb).     Weight management plan: Discussed healthy diet and exercise guidelines    He reports that he has never smoked. He has never used smokeless tobacco.      Counseling Resources:  ATP IV Guidelines  Pooled Cohorts Equation Calculator  FRAX Risk Assessment  ICSI Preventive Guidelines  Dietary Guidelines for Americans, 2010  USDA's MyPlate  ASA Prophylaxis  Lung CA Screening    Luigi Brown PA-C  [facility]  "

## 2021-03-16 ASSESSMENT — ASTHMA QUESTIONNAIRES: ACT_TOTALSCORE: 25

## 2021-07-14 ENCOUNTER — OFFICE VISIT (OUTPATIENT)
Dept: URGENT CARE | Facility: URGENT CARE | Age: 41
End: 2021-07-14
Payer: COMMERCIAL

## 2021-07-14 VITALS
WEIGHT: 180 LBS | RESPIRATION RATE: 12 BRPM | TEMPERATURE: 98.5 F | BODY MASS INDEX: 25.77 KG/M2 | OXYGEN SATURATION: 97 % | HEART RATE: 85 BPM | HEIGHT: 70 IN | SYSTOLIC BLOOD PRESSURE: 118 MMHG | DIASTOLIC BLOOD PRESSURE: 80 MMHG

## 2021-07-14 DIAGNOSIS — M10.9 ACUTE GOUTY ARTHRITIS: Primary | ICD-10-CM

## 2021-07-14 PROCEDURE — 99213 OFFICE O/P EST LOW 20 MIN: CPT | Performed by: FAMILY MEDICINE

## 2021-07-14 RX ORDER — FLUTICASONE PROPIONATE 50 MCG
1 SPRAY, SUSPENSION (ML) NASAL DAILY
COMMUNITY
End: 2022-05-10

## 2021-07-14 ASSESSMENT — MIFFLIN-ST. JEOR: SCORE: 1714.78

## 2021-07-14 NOTE — PROGRESS NOTES
Subjective: 6 days ago after a vacation where he was eating richer foods and drinking more alcohol he developed pain in the left first MTP joint of his foot.  He has never had this or gout before.  A few days before he had hurt it a little bit but that did not seem like it was connected, does have limited movement of that joint normally so it was swollen and red and warm and he took some ibuprofen and it gradually got better and is much better now but he wanted to consider the possibility that it was gout.    Objective: Left first MTP joint is mildly warm, mildly red, mildly swollen but not particularly tender and has a reasonably good range of motion.    Assessment and plan: From his story this is almost undoubtedly gout which is now resolving, first episode, ideal time to check his uric acid level would be in about a month so he will come back for those tests, does not need a kidney function test because he just had that in March.  If the uric acid level is high and he has recurrences in a relatively short period of time then he could be a candidate for preventative medication.  He is on a diuretic for blood pressure which would be another risk factor.  Hopefully he can decrease his risk factors and never will have another outbreak but the lab tests in a month will help decide whether he would be a good candidate.

## 2021-08-12 ENCOUNTER — LAB (OUTPATIENT)
Dept: LAB | Facility: CLINIC | Age: 41
End: 2021-08-12

## 2021-08-12 ENCOUNTER — OFFICE VISIT (OUTPATIENT)
Dept: FAMILY MEDICINE | Facility: CLINIC | Age: 41
End: 2021-08-12
Payer: COMMERCIAL

## 2021-08-12 VITALS
DIASTOLIC BLOOD PRESSURE: 90 MMHG | WEIGHT: 181.12 LBS | BODY MASS INDEX: 26.36 KG/M2 | HEART RATE: 79 BPM | TEMPERATURE: 97.9 F | OXYGEN SATURATION: 97 % | SYSTOLIC BLOOD PRESSURE: 130 MMHG

## 2021-08-12 DIAGNOSIS — M10.9 ACUTE GOUTY ARTHRITIS: ICD-10-CM

## 2021-08-12 DIAGNOSIS — N64.4 BREAST PAIN: Primary | ICD-10-CM

## 2021-08-12 PROCEDURE — 84550 ASSAY OF BLOOD/URIC ACID: CPT

## 2021-08-12 PROCEDURE — 36415 COLL VENOUS BLD VENIPUNCTURE: CPT

## 2021-08-12 PROCEDURE — 99212 OFFICE O/P EST SF 10 MIN: CPT | Performed by: FAMILY MEDICINE

## 2021-08-12 NOTE — PROGRESS NOTES
Assessment & Plan     Breast pain  -Resolved.      DO DEM[facility]    Nakia Dash is a 41 year old who presents for the following health issues:    HPI     Patient here for ache/lump over his left chest a few weeks ago. States was itchy and achy, worse at night. Has since resolved.   has had this before and had US of his breast which was normal. No other concerns today.     Review of Systems   CONSTITUTIONAL: NEGATIVE for fever, chills, change in weight  INTEGUMENTARY/SKIN: NEGATIVE for worrisome rashes, moles or lesions      Objective    BP (!) 130/90 (BP Location: Left arm, Patient Position: Sitting, Cuff Size: Adult Regular)   Pulse 79   Temp 97.9  F (36.6  C) (Temporal)   Wt 82.2 kg (181 lb 1.9 oz)   SpO2 97%   BMI 26.36 kg/m    Body mass index is 26.36 kg/m .  Physical Exam   GENERAL: healthy, alert and no distress  BREAST: left breast: no tenderness, masses, or lumps  SKIN: no suspicious lesions or rashes

## 2021-08-13 LAB — URATE SERPL-MCNC: 8.3 MG/DL (ref 3.5–7.2)

## 2021-09-20 NOTE — PROGRESS NOTES
[facility] Initial Evaluation    Subjective (self referred):  Greg Tripp is a 41 year old male with a right shoulder condition. Mechanism of injury: throwing a stone. Where: (home, work, MVA, community, recreation/sport, unknown, other): in the community. Onset of symptoms: August 2021. Location of symptoms: posterior. Pain level on number scale: 1-5/10. Quality of pain: ache. Associated symptoms: weakness. Pain frequency (constant/intermittent): intermittent. Symptoms are exacerbated by: lying, pushing, reaching behind back. Symptoms are relieved by: rest. Progression of symptoms since onset (same/better/worse): better. Special tests (x-ray, MRI, CT scan, EMG, bone scan): None. Previous treatment: None. Improvement with previous treatment: NA. General health as reported by patient is good.    Pertinent medical history includes:    Past Medical History:   Diagnosis Date     Hypertension 2017    Genetic, father     NO ACTIVE PROBLEMS allergies     Skin disease 2017/18    Eczema     Stomach problems 6/2019    What I'm being seen for     Uncomplicated asthma 2015    Sports-induced     Urinary tract infection 12/2017     Medical allergies includes:   Penicillins  Surgical history includes:   Past Surgical History:   Procedure Laterality Date     ABDOMEN SURGERY  2003    Appendectomy     C APPENDECTOMY  1/04     ESOPHAGOSCOPY, GASTROSCOPY, DUODENOSCOPY (EGD), COMBINED N/A 11/6/2019    Procedure: ESOPHAGOGASTRODUODENOSCOPY (EGD);  Surgeon: Patricia Shearer MD;  Location:  OR     ORTHOPEDIC SURGERY  06/03    Knee arthroscopy (plica)     SURGICAL HISTORY OF -   7/03    right knee arthoscopy     Current medications include:     Current Outpatient Medications:      albuterol (PROAIR HFA/PROVENTIL HFA/VENTOLIN HFA) 108 (90 Base) MCG/ACT inhaler, Inhale 2 puffs into the lungs every 6 hours as needed for shortness of breath / dyspnea or wheezing, Disp: 6.7 g, Rfl: 11     Cetirizine  HCl (ZYRTEC ALLERGY PO), Take by mouth daily, Disp: , Rfl:      fluticasone (FLONASE) 50 MCG/ACT nasal spray, Spray 1 spray into both nostrils daily, Disp: , Rfl:      lisinopril-hydrochlorothiazide (ZESTORETIC) 20-12.5 MG tablet, Take 1 tablet by mouth daily, Disp: 90 tablet, Rfl: 3    Occupation: IT management. Patient is (working in normal job without restrictions/working in normal job with restrictions/working in an alternate job/not working due to present treatment problem): working in normal job. Primary job tasks: computer work. Barriers at home/work: None reported by patient. Red flags: None reported by patient.    Objective  Posture    Forward Head +   Rounded Shoulders +   Scapular Winging      Bilateral shoulder AROM WNL; right posterior shoulder pain with end range FL, ABD, ER and IR    Shoulder Strength (* = pain) Right Left   FL 5-/5 5/5   ABD 4/5 5/5   ER 4/5 5/5   IR 5/5 5/5   Biceps 5/5 5/5     Special Tests Right Left   Ricks-Abraham +    Neer     [place]     Anterior Aprehension     Posterior Apprehension     Sulcus Sign     AC Crossover     Drop-Arm     Lift-off Sign     Painful Arc       Palpation tenderness:  No palpation tenderness    Cervical AROM WNL except right rotation = min loss    Assessment/Plan:    Patient is a 41 year old male with right side shoulder complaints.    Patient has the following significant findings with corresponding treatment plan.                Diagnosis 1:  Right shoulder pain  Pain -  manual therapy, self management, education, directional preference exercise and home program  Decreased ROM/flexibility - manual therapy and therapeutic exercise  Decreased strength - therapeutic exercise and therapeutic activities  Impaired muscle performance - neuro re-education  Decreased function - therapeutic activities  Impaired posture - neuro re-education    Previous and current functional limitations:  (See Goal Flow Sheet for this information)    Short term and Long term  goals: (See Goal Flow Sheet for this information)     Communication ability:  Patient appears to be able to clearly communicate and understand verbal and written communication and follow directions correctly.  Treatment Explanation - The following has been discussed with the patient:   RX ordered/plan of care  Anticipated outcomes  Possible risks and side effects  This patient would benefit from PT intervention to resume normal activities.   Rehab potential is good.    Frequency:  1 X week, once daily  Duration:  for 4 weeks tapering to 2 X a month over 1 month  Discharge Plan:  Achieve all LTG.  Independent in home treatment program.  Reach maximal therapeutic benefit.    Please refer to the daily flowsheet for treatment today, total treatment time and time spent performing 1:1 timed codes.

## 2021-09-22 ENCOUNTER — THERAPY VISIT (OUTPATIENT)
Dept: PHYSICAL THERAPY | Facility: CLINIC | Age: 41
End: 2021-09-22
Payer: COMMERCIAL

## 2021-09-22 DIAGNOSIS — M25.511 ACUTE PAIN OF RIGHT SHOULDER: ICD-10-CM

## 2021-09-22 PROCEDURE — 97110 THERAPEUTIC EXERCISES: CPT | Mod: GP | Performed by: PHYSICAL THERAPIST

## 2021-09-22 PROCEDURE — 97161 PT EVAL LOW COMPLEX 20 MIN: CPT | Mod: GP | Performed by: PHYSICAL THERAPIST

## 2021-09-22 PROCEDURE — 97530 THERAPEUTIC ACTIVITIES: CPT | Mod: GP | Performed by: PHYSICAL THERAPIST

## 2021-10-06 ENCOUNTER — THERAPY VISIT (OUTPATIENT)
Dept: PHYSICAL THERAPY | Facility: CLINIC | Age: 41
End: 2021-10-06
Payer: COMMERCIAL

## 2021-10-06 DIAGNOSIS — M25.511 ACUTE PAIN OF RIGHT SHOULDER: ICD-10-CM

## 2021-10-06 PROCEDURE — 97112 NEUROMUSCULAR REEDUCATION: CPT | Mod: GP | Performed by: PHYSICAL THERAPIST

## 2021-10-06 PROCEDURE — 97110 THERAPEUTIC EXERCISES: CPT | Mod: GP | Performed by: PHYSICAL THERAPIST

## 2021-10-23 ENCOUNTER — HEALTH MAINTENANCE LETTER (OUTPATIENT)
Age: 41
End: 2021-10-23

## 2021-12-01 PROBLEM — M25.511 ACUTE PAIN OF RIGHT SHOULDER: Status: RESOLVED | Noted: 2021-09-22 | Resolved: 2021-12-01

## 2021-12-01 NOTE — PROGRESS NOTES
Discharge Note    Progress reporting period is from initial evaluation date (please see noted date below) to Oct 6, 2021.  Linked Episodes   Type: Episode: Status: Noted: Resolved: Last update: Updated by:   PHYSICAL THERAPY right shoulder Active 9/22/2021  10/6/2021  1:23 PM Benjamin Muhammad PT      Comments:       Greg failed to follow up and current status is unknown.  Please see information below for last relevant information on current status.  Patient seen for 2 visits.    SUBJECTIVE  Subjective changes noted by patient:  Patient reports symptoms are feeling about the same overall. No pain at rest. Pain with reaching out the side, throwing and making the bed. Patient states he is consistent with his HEP and understands it will take time to build up strength.  .  Current pain level is 0/10.     Previous pain level was  5/10.   Changes in function:  Yes (See Goal flowsheet attached for changes in current functional level)  Adverse reaction to treatment or activity: None    OBJECTIVE  Changes noted in objective findings: right shoulder AROM WNL throughout - pain during ABD; right shoulder strength: FL = 5-/5, ABD and ER = 4/5; weak scapular stabilizers     ASSESSMENT/PLAN  Diagnosis: right shoulder pain   Updated problem list and treatment plan:     STG/LTGs have been met or progress has been made towards goals:  Yes, please see goal flowsheet for most current information  Assessment of Progress: current status is unknown.    Last current status:     Self Management Plans:  HEP  I have re-evaluated this patient and find that the nature, scope, duration and intensity of the therapy is appropriate for the medical condition of the patient.  Greg continues to require the following intervention to meet STG and LTG's:  HEP.    Recommendations:  Discharge with current home program.  Patient to follow up with MD as needed.    Please refer to the daily flowsheet for treatment today, total treatment time and time  spent performing 1:1 timed codes.

## 2022-03-11 ASSESSMENT — ENCOUNTER SYMPTOMS
FEVER: 0
HEADACHES: 0
PALPITATIONS: 0
HEARTBURN: 1
COUGH: 0
NAUSEA: 0
DIZZINESS: 0
WEAKNESS: 0
HEMATOCHEZIA: 0
SORE THROAT: 0
MYALGIAS: 0
HEMATURIA: 0
DIARRHEA: 0
NERVOUS/ANXIOUS: 0
EYE PAIN: 0
CONSTIPATION: 0
ABDOMINAL PAIN: 0
CHILLS: 0
SHORTNESS OF BREATH: 0
ARTHRALGIAS: 0
PARESTHESIAS: 0
FREQUENCY: 0
DYSURIA: 0
JOINT SWELLING: 0

## 2022-03-14 ENCOUNTER — OFFICE VISIT (OUTPATIENT)
Dept: FAMILY MEDICINE | Facility: CLINIC | Age: 42
End: 2022-03-14
Payer: COMMERCIAL

## 2022-03-14 VITALS
RESPIRATION RATE: 16 BRPM | OXYGEN SATURATION: 100 % | TEMPERATURE: 98.6 F | SYSTOLIC BLOOD PRESSURE: 112 MMHG | BODY MASS INDEX: 27.06 KG/M2 | WEIGHT: 189 LBS | HEART RATE: 73 BPM | DIASTOLIC BLOOD PRESSURE: 84 MMHG | HEIGHT: 70 IN

## 2022-03-14 DIAGNOSIS — Z13.220 SCREENING FOR HYPERLIPIDEMIA: ICD-10-CM

## 2022-03-14 DIAGNOSIS — I10 ESSENTIAL HYPERTENSION: ICD-10-CM

## 2022-03-14 DIAGNOSIS — Z00.00 ROUTINE GENERAL MEDICAL EXAMINATION AT A HEALTH CARE FACILITY: Primary | ICD-10-CM

## 2022-03-14 LAB
ALBUMIN SERPL-MCNC: 3.6 G/DL (ref 3.4–5)
ALP SERPL-CCNC: 63 U/L (ref 40–150)
ALT SERPL W P-5'-P-CCNC: 32 U/L (ref 0–70)
ANION GAP SERPL CALCULATED.3IONS-SCNC: 7 MMOL/L (ref 3–14)
AST SERPL W P-5'-P-CCNC: 21 U/L (ref 0–45)
BILIRUB SERPL-MCNC: 0.7 MG/DL (ref 0.2–1.3)
BUN SERPL-MCNC: 11 MG/DL (ref 7–30)
CALCIUM SERPL-MCNC: 9.4 MG/DL (ref 8.5–10.1)
CHLORIDE BLD-SCNC: 105 MMOL/L (ref 94–109)
CHOLEST SERPL-MCNC: 189 MG/DL
CO2 SERPL-SCNC: 28 MMOL/L (ref 20–32)
CREAT SERPL-MCNC: 1.03 MG/DL (ref 0.66–1.25)
FASTING STATUS PATIENT QL REPORTED: YES
GFR SERPL CREATININE-BSD FRML MDRD: >90 ML/MIN/1.73M2
GLUCOSE BLD-MCNC: 88 MG/DL (ref 70–99)
HDLC SERPL-MCNC: 54 MG/DL
LDLC SERPL CALC-MCNC: 115 MG/DL
NONHDLC SERPL-MCNC: 135 MG/DL
POTASSIUM BLD-SCNC: 3.6 MMOL/L (ref 3.4–5.3)
PROT SERPL-MCNC: 7.4 G/DL (ref 6.8–8.8)
SODIUM SERPL-SCNC: 140 MMOL/L (ref 133–144)
TRIGL SERPL-MCNC: 99 MG/DL

## 2022-03-14 PROCEDURE — 36415 COLL VENOUS BLD VENIPUNCTURE: CPT | Performed by: FAMILY MEDICINE

## 2022-03-14 PROCEDURE — 99396 PREV VISIT EST AGE 40-64: CPT | Performed by: FAMILY MEDICINE

## 2022-03-14 PROCEDURE — 80053 COMPREHEN METABOLIC PANEL: CPT | Performed by: FAMILY MEDICINE

## 2022-03-14 PROCEDURE — 99213 OFFICE O/P EST LOW 20 MIN: CPT | Mod: 25 | Performed by: FAMILY MEDICINE

## 2022-03-14 PROCEDURE — 80061 LIPID PANEL: CPT | Performed by: FAMILY MEDICINE

## 2022-03-14 RX ORDER — LISINOPRIL AND HYDROCHLOROTHIAZIDE 12.5; 2 MG/1; MG/1
1 TABLET ORAL DAILY
Qty: 90 TABLET | Refills: 3 | Status: SHIPPED | OUTPATIENT
Start: 2022-03-14 | End: 2023-03-17

## 2022-03-14 ASSESSMENT — ENCOUNTER SYMPTOMS
CHILLS: 0
CONSTIPATION: 0
PALPITATIONS: 0
WEAKNESS: 0
NAUSEA: 0
HEARTBURN: 1
JOINT SWELLING: 0
FEVER: 0
NERVOUS/ANXIOUS: 0
HEMATURIA: 0
ABDOMINAL PAIN: 0
HEMATOCHEZIA: 0
ARTHRALGIAS: 0
DIZZINESS: 0
HEADACHES: 0
DIARRHEA: 0
SORE THROAT: 0
COUGH: 0
MYALGIAS: 0
PARESTHESIAS: 0
DYSURIA: 0
EYE PAIN: 0
SHORTNESS OF BREATH: 0
FREQUENCY: 0

## 2022-03-14 ASSESSMENT — ASTHMA QUESTIONNAIRES: ACT_TOTALSCORE: 24

## 2022-03-14 NOTE — PROGRESS NOTES
SUBJECTIVE:   CC: Greg Tripp is an 41 year old male who presents for preventative health visit.     Patient has been advised of split billing requirements and indicates understanding: Yes  Healthy Habits:     Getting at least 3 servings of Calcium per day:  NO    Bi-annual eye exam:  NO    Dental care twice a year:  Yes    Sleep apnea or symptoms of sleep apnea:  None    Diet:  Regular (no restrictions) and Low salt    Frequency of exercise:  2-3 days/week    Duration of exercise:  30-45 minutes    Taking medications regularly:  Yes    Medication side effects:  Not applicable    PHQ-2 Total Score: 0    Additional concerns today:  No    Hypertension Follow-up      Do you check your blood pressure regularly outside of the clinic? Yes     Are you following a low salt diet? No    Are your blood pressures ever more than 140 on the top number (systolic) OR more   than 90 on the bottom number (diastolic), for example 140/90? No    Asthma Follow-Up    Was ACT completed today?    Yes    ACT Total Scores 3/14/2022   ACT TOTAL SCORE (Goal Greater than or Equal to 20) 24   In the past 12 months, how many times did you visit the emergency room for your asthma without being admitted to the hospital? 0   In the past 12 months, how many times were you hospitalized overnight because of your asthma? 0       How many days per week do you miss taking your asthma controller medication?  I do not have an asthma controller medication    Please describe any recent triggers for your asthma: exercise or sports    Have you had any Emergency Room Visits, Urgent Care Visits, or Hospital Admissions since your last office visit?  No  Today's PHQ-2 Score:   PHQ-2 ( 1999 Pfizer) 3/11/2022   Q1: Little interest or pleasure in doing things 0   Q2: Feeling down, depressed or hopeless 0   PHQ-2 Score 0   PHQ-2 Total Score (12-17 Years)- Positive if 3 or more points; Administer PHQ-A if positive -   Q1: Little interest or pleasure in doing  things Not at all   Q2: Feeling down, depressed or hopeless Not at all   PHQ-2 Score 0     Abuse: Current or Past(Physical, Sexual or Emotional)- No  Do you feel safe in your environment? Yes    Social History     Tobacco Use     Smoking status: Never Smoker     Smokeless tobacco: Never Used   Substance Use Topics     Alcohol use: Yes     Alcohol/week: 1.7 standard drinks     Comment: 7-14 drinks a week      If you drink alcohol do you typically have >3 drinks per day or >7 drinks per week? Yes    Alcohol Use 3/14/2022   Prescreen: >3 drinks/day or >7 drinks/week? -   Prescreen: >3 drinks/day or >7 drinks/week? -   AUDIT SCORE  4     AUDIT - Alcohol Use Disorders Identification Test - Reproduced from the World Health Organization Audit 2001 (Second Edition) 3/14/2022   1.  How often do you have a drink containing alcohol? 4 or more times a week   2.  How many drinks containing alcohol do you have on a typical day when you are drinking? 1 or 2   3.  How often do you have five or more drinks on one occasion? Never   4.  How often during the last year have you found that you were not able to stop drinking once you had started? Never   5.  How often during the last year have you failed to do what was normally expected of you because of drinking? Never   6.  How often during the last year have you needed a first drink in the morning to get yourself going after a heavy drinking session? Never   7.  How often during the last year have you had a feeling of guilt or remorse after drinking? Never   8.  How often during the last year have you been unable to remember what happened the night before because of your drinking? Never   9.  Have you or someone else been injured because of your drinking? No   10. Has a relative, friend, doctor or other health care worker been concerned about your drinking or suggested you cut down? No   TOTAL SCORE 4     Last PSA: No results found for: PSA    Reviewed orders with patient. 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 Due to covid, need to find a new team again. Old team is no longer there.   Lately more sedentary. Weight bench - getting in to that. Walking.     Work - desk job - .     Lives with his family.     Alcohol - 10-14 beers per week.     Asthma - sports induced. Albuterol before sports.     Review of Systems   Constitutional: Negative for chills and fever.   HENT: Negative for congestion, ear pain, hearing loss and sore throat.    Eyes: Negative for pain and visual disturbance.   Respiratory: Negative for cough and shortness of breath.    Cardiovascular: Negative for chest pain, palpitations and peripheral edema.   Gastrointestinal: Positive for heartburn. Negative for abdominal pain, constipation, diarrhea, hematochezia and nausea.   Genitourinary: Negative for dysuria, frequency, genital sores, hematuria, impotence, penile discharge and urgency.   Musculoskeletal: Negative for arthralgias, joint swelling and myalgias.   Skin: Negative for rash.   Neurological: Negative for dizziness, weakness, headaches and paresthesias.   Psychiatric/Behavioral: Negative for mood changes. The patient is not nervous/anxious.      OBJECTIVE:   /84 (BP Location: Right arm, Patient Position: Sitting, Cuff Size: Adult Regular)   Pulse 73   Temp 98.6  F (37  C) (Temporal)   Resp 16   Ht 1.772 m (5' 9.75\")   Wt 85.7 kg (189 lb)   SpO2 100%   BMI 27.31 kg/m      Physical Exam  GENERAL: healthy, alert and no distress  EYES: Eyes grossly normal to inspection, PERRL and conjunctivae and sclerae normal  HENT: ear canals and TM's normal, nose and mouth without ulcers or lesions  NECK: no adenopathy, no asymmetry, masses, or scars and thyroid normal to " "palpation  RESP: lungs clear to auscultation - no rales, rhonchi or wheezes  CV: regular rate and rhythm, normal S1 S2, no S3 or S4, no murmur, click or rub, no peripheral edema and peripheral pulses strong  ABDOMEN: soft, nontender, no hepatosplenomegaly, no masses and bowel sounds normal   (male): normal male genitalia without lesions or urethral discharge, no hernia  MS: no gross musculoskeletal defects noted, no edema  SKIN: no suspicious lesions or rashes  NEURO: Normal strength and tone, mentation intact and speech normal  PSYCH: mentation appears normal, affect normal/bright    ASSESSMENT/PLAN:   (Z00.00) Routine general medical examination at a health care facility  (primary encounter diagnosis)  Comment:    Plan:      (I10) Essential hypertension  Comment:  Patient is tolerating current medication without any major side effects of concerns and current dose seems reasonable too.  Current medication regime is effective. Continue current treatment without any changes.   Plan: lisinopril-hydrochlorothiazide (ZESTORETIC)         20-12.5 MG tablet, Comprehensive metabolic         panel (BMP + Alb, Alk Phos, ALT, AST, Total.         Bili, TP)           (Z13.220) Screening for hyperlipidemia  Comment:    Plan: Lipid panel reflex to direct LDL Fasting     COUNSELING:   Reviewed preventive health counseling, as reflected in patient instructions  Special attention given to:        Regular exercise       Healthy diet/nutrition       Vision screening       Hearing screening    Estimated body mass index is 27.31 kg/m  as calculated from the following:    Height as of this encounter: 1.772 m (5' 9.75\").    Weight as of this encounter: 85.7 kg (189 lb).     Weight management plan: Discussed healthy diet and exercise guidelines    He reports that he has never smoked. He has never used smokeless tobacco.    Counseling Resources:  ATP IV Guidelines  Pooled Cohorts Equation Calculator  FRAX Risk Assessment  ICSI Preventive " Guidelines  Dietary Guidelines for Americans, 2010  USDA's MyPlate  ASA Prophylaxis  Lung CA Screening    Xavi Haddad MD  [facility]

## 2022-05-10 ENCOUNTER — MYC MEDICAL ADVICE (OUTPATIENT)
Dept: FAMILY MEDICINE | Facility: CLINIC | Age: 42
End: 2022-05-10
Payer: COMMERCIAL

## 2022-05-10 DIAGNOSIS — J30.2 SEASONAL ALLERGIC RHINITIS, UNSPECIFIED TRIGGER: Primary | ICD-10-CM

## 2022-05-10 RX ORDER — CETIRIZINE HYDROCHLORIDE 10 MG/1
10 TABLET ORAL PRN
Qty: 90 TABLET | Refills: 3 | Status: CANCELLED | OUTPATIENT
Start: 2022-05-10

## 2022-05-12 RX ORDER — FLUTICASONE PROPIONATE 50 MCG
1 SPRAY, SUSPENSION (ML) NASAL DAILY
Qty: 16 G | Refills: 11 | Status: SHIPPED | OUTPATIENT
Start: 2022-05-12 | End: 2023-06-18

## 2022-05-12 NOTE — TELEPHONE ENCOUNTER
"Routing refill request to provider for review/approval because:  Medication is reported/historical    Pt message: \"I thought we had updated my script for this on my recent visit, but Yann didn't have record of it. Since I was just in there and it's a known prescription for me can I get a script resubmitted to get refills? The Spring allergies need a bit more than the Zyrtec\"    DANE Covarrubias RN  [facility]              "

## 2022-10-09 ENCOUNTER — HEALTH MAINTENANCE LETTER (OUTPATIENT)
Age: 42
End: 2022-10-09

## 2022-11-01 ASSESSMENT — ASTHMA QUESTIONNAIRES
ACT_TOTALSCORE: 25
QUESTION_4 LAST FOUR WEEKS HOW OFTEN HAVE YOU USED YOUR RESCUE INHALER OR NEBULIZER MEDICATION (SUCH AS ALBUTEROL): NOT AT ALL
QUESTION_1 LAST FOUR WEEKS HOW MUCH OF THE TIME DID YOUR ASTHMA KEEP YOU FROM GETTING AS MUCH DONE AT WORK, SCHOOL OR AT HOME: NONE OF THE TIME
QUESTION_2 LAST FOUR WEEKS HOW OFTEN HAVE YOU HAD SHORTNESS OF BREATH: NOT AT ALL
QUESTION_5 LAST FOUR WEEKS HOW WOULD YOU RATE YOUR ASTHMA CONTROL: COMPLETELY CONTROLLED
QUESTION_3 LAST FOUR WEEKS HOW OFTEN DID YOUR ASTHMA SYMPTOMS (WHEEZING, COUGHING, SHORTNESS OF BREATH, CHEST TIGHTNESS OR PAIN) WAKE YOU UP AT NIGHT OR EARLIER THAN USUAL IN THE MORNING: NOT AT ALL
ACT_TOTALSCORE: 25

## 2022-11-02 ENCOUNTER — OFFICE VISIT (OUTPATIENT)
Dept: INTERNAL MEDICINE | Facility: CLINIC | Age: 42
End: 2022-11-02
Payer: COMMERCIAL

## 2022-11-02 VITALS
DIASTOLIC BLOOD PRESSURE: 84 MMHG | TEMPERATURE: 97.3 F | RESPIRATION RATE: 16 BRPM | BODY MASS INDEX: 25.27 KG/M2 | SYSTOLIC BLOOD PRESSURE: 114 MMHG | HEART RATE: 82 BPM | OXYGEN SATURATION: 99 % | WEIGHT: 176.5 LBS | HEIGHT: 70 IN

## 2022-11-02 DIAGNOSIS — Z00.00 HEALTH MAINTENANCE EXAMINATION: ICD-10-CM

## 2022-11-02 DIAGNOSIS — N62 GYNECOMASTIA, MALE: Primary | ICD-10-CM

## 2022-11-02 LAB
ALBUMIN SERPL-MCNC: 4 G/DL (ref 3.4–5)
ALP SERPL-CCNC: 62 U/L (ref 40–150)
ALT SERPL W P-5'-P-CCNC: 22 U/L (ref 0–70)
ANION GAP SERPL CALCULATED.3IONS-SCNC: 8 MMOL/L (ref 3–14)
AST SERPL W P-5'-P-CCNC: 21 U/L (ref 0–45)
BILIRUB SERPL-MCNC: 0.9 MG/DL (ref 0.2–1.3)
BUN SERPL-MCNC: 11 MG/DL (ref 7–30)
CALCIUM SERPL-MCNC: 9.1 MG/DL (ref 8.5–10.1)
CHLORIDE BLD-SCNC: 104 MMOL/L (ref 94–109)
CO2 SERPL-SCNC: 27 MMOL/L (ref 20–32)
CREAT SERPL-MCNC: 0.94 MG/DL (ref 0.66–1.25)
ERYTHROCYTE [DISTWIDTH] IN BLOOD BY AUTOMATED COUNT: 11.9 % (ref 10–15)
GFR SERPL CREATININE-BSD FRML MDRD: >90 ML/MIN/1.73M2
GLUCOSE BLD-MCNC: 93 MG/DL (ref 70–99)
HCT VFR BLD AUTO: 43 % (ref 40–53)
HGB BLD-MCNC: 14.8 G/DL (ref 13.3–17.7)
MCH RBC QN AUTO: 31.2 PG (ref 26.5–33)
MCHC RBC AUTO-ENTMCNC: 34.4 G/DL (ref 31.5–36.5)
MCV RBC AUTO: 91 FL (ref 78–100)
PLATELET # BLD AUTO: 225 10E3/UL (ref 150–450)
POTASSIUM BLD-SCNC: 3.5 MMOL/L (ref 3.4–5.3)
PROT SERPL-MCNC: 7.4 G/DL (ref 6.8–8.8)
RBC # BLD AUTO: 4.74 10E6/UL (ref 4.4–5.9)
SODIUM SERPL-SCNC: 139 MMOL/L (ref 133–144)
WBC # BLD AUTO: 4.1 10E3/UL (ref 4–11)

## 2022-11-02 PROCEDURE — 90471 IMMUNIZATION ADMIN: CPT | Performed by: NURSE PRACTITIONER

## 2022-11-02 PROCEDURE — 99213 OFFICE O/P EST LOW 20 MIN: CPT | Mod: 25 | Performed by: NURSE PRACTITIONER

## 2022-11-02 PROCEDURE — 80053 COMPREHEN METABOLIC PANEL: CPT | Performed by: NURSE PRACTITIONER

## 2022-11-02 PROCEDURE — 36415 COLL VENOUS BLD VENIPUNCTURE: CPT | Performed by: NURSE PRACTITIONER

## 2022-11-02 PROCEDURE — 85027 COMPLETE CBC AUTOMATED: CPT | Performed by: NURSE PRACTITIONER

## 2022-11-02 PROCEDURE — 90686 IIV4 VACC NO PRSV 0.5 ML IM: CPT | Performed by: NURSE PRACTITIONER

## 2022-11-02 PROCEDURE — 84403 ASSAY OF TOTAL TESTOSTERONE: CPT | Performed by: NURSE PRACTITIONER

## 2022-11-02 ASSESSMENT — PAIN SCALES - GENERAL: PAINLEVEL: NO PAIN (0)

## 2022-11-02 NOTE — PROGRESS NOTES
"  Assessment & Plan     Gynecomastia, male    - CBC with platelets; Future  - Comprehensive metabolic panel (BMP + Alb, Alk Phos, ALT, AST, Total. Bili, TP); Future  - Testosterone, total; Future  - MA Diagnostic Digital Bilateral; Future  - CBC with platelets  - Comprehensive metabolic panel (BMP + Alb, Alk Phos, ALT, AST, Total. Bili, TP)  - Testosterone, total    Health maintenance examination    - INFLUENZA VACCINE IM > 6 MONTHS VALENT IIV4 (AFLURIA/FLUZONE)    Consider surgery referral for gynecomastia, pt will consider this  Feels more comfortable wearing sports bra, breast support  Denies feeling anxious, depressed.         BMI:   Estimated body mass index is 25.51 kg/m  as calculated from the following:    Height as of this encounter: 1.772 m (5' 9.75\").    Weight as of this encounter: 80.1 kg (176 lb 8 oz).           Lulú Burns NP  [facility] JOSE Dash is a 42 year old, presenting for the following health issues:  Breast Problem (Breast size increased)      History of Present Illness       Reason for visit:  Perceived change in breast size.  Symptom onset:  More than a monthHe consumes 0 sweetened beverage(s) daily.He exercises with enough effort to increase his heart rate 30 to 60 minutes per day.  He exercises with enough effort to increase his heart rate 3 or less days per week.   He is taking medications regularly.       Breast Concern  Onset/Duration: years, worse past 6 months  Description:   Location: L>R gynecomastia  Pain or tenderness: YES, nipple sensitivity, tenderness  Redness:  no   Intensity: mild  Progression of Symptoms: constant  Accompanying Signs & Symptoms:  Any lumps in axillary region:  no   Movable:  no   Nipple discharge: none  Changes in the skin or nipple: no  On Hormone therapy:  no   Does it change with menstrual cycle: N/A  Previous history of similar problem: yes  First degree relative with breast cancer: a negative family history " "for breast cancer.  Precipitating factors:           Worsened by: none  Alleviating factors:            Improved by: support garments, bra  Therapies tried and outcome: 2016 mammogram, US  No LMP for male patient.      Review of Systems   CONSTITUTIONAL: NEGATIVE for fever, chills, change in weight  ENT/MOUTH: NEGATIVE for ear, mouth and throat problems  RESP: NEGATIVE for significant cough or SOB  CV: NEGATIVE for chest pain, palpitations or peripheral edema  GI: NEGATIVE for nausea, abdominal pain, heartburn, or change in bowel habits  MUSCULOSKELETAL: NEGATIVE for significant arthralgias or myalgia  ENDOCRINE: NEGATIVE for temperature intolerance, skin/hair changes  PSYCHIATRIC: NEGATIVE for changes in mood or affect      Objective    /84 (BP Location: Right arm, Patient Position: Sitting, Cuff Size: Adult Regular)   Pulse 82   Temp 97.3  F (36.3  C) (Tympanic)   Resp 16   Ht 1.772 m (5' 9.75\")   Wt 80.1 kg (176 lb 8 oz)   SpO2 99%   BMI 25.51 kg/m    Body mass index is 25.51 kg/m .  Physical Exam   GENERAL: healthy, alert and no distress  EYES: Eyes grossly normal to inspection, PERRL and conjunctivae and sclerae normal  NECK: no adenopathy, no asymmetry, masses, or scars and thyroid normal to palpation  RESP: lungs clear to auscultation - no rales, rhonchi or wheezes  BREAST: normal without masses, tenderness or nipple discharge, no palpable axillary masses or adenopathy.  L>R gynecomastia  CV: regular rate and rhythm, normal S1 S2, no S3 or S4, no murmur, click or rub, no peripheral edema and peripheral pulses strong  ABDOMEN: soft, nontender, no hepatosplenomegaly, no masses and bowel sounds normal  PSYCH: tangential, anxious and speech rushed.                    "

## 2022-11-07 LAB — TESTOST SERPL-MCNC: 516 NG/DL (ref 240–950)

## 2022-11-21 ENCOUNTER — HOSPITAL ENCOUNTER (OUTPATIENT)
Dept: MAMMOGRAPHY | Facility: CLINIC | Age: 42
Discharge: HOME | End: 2022-11-21
Attending: NURSE PRACTITIONER
Payer: COMMERCIAL

## 2022-11-21 DIAGNOSIS — N62 GYNECOMASTIA, MALE: ICD-10-CM

## 2022-11-21 PROCEDURE — 77066 DX MAMMO INCL CAD BI: CPT

## 2023-03-16 ASSESSMENT — ENCOUNTER SYMPTOMS
ARTHRALGIAS: 0
FREQUENCY: 0
SHORTNESS OF BREATH: 0
JOINT SWELLING: 0
CHILLS: 0
WEAKNESS: 0
ABDOMINAL PAIN: 1
NAUSEA: 0
PARESTHESIAS: 0
SORE THROAT: 0
NERVOUS/ANXIOUS: 0
HEMATURIA: 0
PALPITATIONS: 0
MYALGIAS: 0
DIZZINESS: 0
EYE PAIN: 0
HEARTBURN: 1
DIARRHEA: 0
HEMATOCHEZIA: 0
COUGH: 0
DYSURIA: 0
CONSTIPATION: 0
FEVER: 0
HEADACHES: 0

## 2023-03-17 ENCOUNTER — OFFICE VISIT (OUTPATIENT)
Dept: FAMILY MEDICINE | Facility: CLINIC | Age: 43
End: 2023-03-17
Payer: COMMERCIAL

## 2023-03-17 VITALS
RESPIRATION RATE: 18 BRPM | HEART RATE: 73 BPM | TEMPERATURE: 97.5 F | BODY MASS INDEX: 25.04 KG/M2 | SYSTOLIC BLOOD PRESSURE: 122 MMHG | OXYGEN SATURATION: 98 % | DIASTOLIC BLOOD PRESSURE: 82 MMHG | WEIGHT: 174.9 LBS | HEIGHT: 70 IN

## 2023-03-17 DIAGNOSIS — Z00.00 ROUTINE GENERAL MEDICAL EXAMINATION AT A HEALTH CARE FACILITY: Primary | ICD-10-CM

## 2023-03-17 DIAGNOSIS — R10.13 DYSPEPSIA: ICD-10-CM

## 2023-03-17 DIAGNOSIS — I10 ESSENTIAL HYPERTENSION: ICD-10-CM

## 2023-03-17 PROBLEM — L30.9 ECZEMA: Status: ACTIVE | Noted: 2018-09-14

## 2023-03-17 LAB
ANION GAP SERPL CALCULATED.3IONS-SCNC: 13 MMOL/L (ref 7–15)
BUN SERPL-MCNC: 13.7 MG/DL (ref 6–20)
CALCIUM SERPL-MCNC: 9.7 MG/DL (ref 8.6–10)
CHLORIDE SERPL-SCNC: 102 MMOL/L (ref 98–107)
CHOLEST SERPL-MCNC: 192 MG/DL
CREAT SERPL-MCNC: 0.94 MG/DL (ref 0.67–1.17)
DEPRECATED HCO3 PLAS-SCNC: 27 MMOL/L (ref 22–29)
GFR SERPL CREATININE-BSD FRML MDRD: >90 ML/MIN/1.73M2
GLUCOSE SERPL-MCNC: 87 MG/DL (ref 70–99)
HDLC SERPL-MCNC: 61 MG/DL
LDLC SERPL CALC-MCNC: 115 MG/DL
NONHDLC SERPL-MCNC: 131 MG/DL
POTASSIUM SERPL-SCNC: 4.1 MMOL/L (ref 3.4–5.3)
SODIUM SERPL-SCNC: 142 MMOL/L (ref 136–145)
TRIGL SERPL-MCNC: 79 MG/DL

## 2023-03-17 PROCEDURE — 90746 HEPB VACCINE 3 DOSE ADULT IM: CPT | Performed by: FAMILY MEDICINE

## 2023-03-17 PROCEDURE — 36415 COLL VENOUS BLD VENIPUNCTURE: CPT | Performed by: FAMILY MEDICINE

## 2023-03-17 PROCEDURE — 80061 LIPID PANEL: CPT | Performed by: FAMILY MEDICINE

## 2023-03-17 PROCEDURE — 99214 OFFICE O/P EST MOD 30 MIN: CPT | Mod: 25 | Performed by: FAMILY MEDICINE

## 2023-03-17 PROCEDURE — 90471 IMMUNIZATION ADMIN: CPT | Performed by: FAMILY MEDICINE

## 2023-03-17 PROCEDURE — 99396 PREV VISIT EST AGE 40-64: CPT | Mod: 25 | Performed by: FAMILY MEDICINE

## 2023-03-17 PROCEDURE — 80048 BASIC METABOLIC PNL TOTAL CA: CPT | Performed by: FAMILY MEDICINE

## 2023-03-17 RX ORDER — LISINOPRIL AND HYDROCHLOROTHIAZIDE 12.5; 2 MG/1; MG/1
1 TABLET ORAL DAILY
Qty: 90 TABLET | Refills: 3 | Status: SHIPPED | OUTPATIENT
Start: 2023-03-17 | End: 2024-03-20

## 2023-03-17 ASSESSMENT — ENCOUNTER SYMPTOMS
CHILLS: 0
COUGH: 0
FREQUENCY: 0
EYE PAIN: 0
JOINT SWELLING: 0
MYALGIAS: 0
CONSTIPATION: 0
HEADACHES: 0
DYSURIA: 0
DIZZINESS: 0
PARESTHESIAS: 0
HEARTBURN: 1
DIARRHEA: 0
WEAKNESS: 0
ABDOMINAL PAIN: 1
FEVER: 0
PALPITATIONS: 0
NAUSEA: 0
HEMATOCHEZIA: 0
HEMATURIA: 0
SHORTNESS OF BREATH: 0
NERVOUS/ANXIOUS: 0
ARTHRALGIAS: 0
SORE THROAT: 0

## 2023-03-17 NOTE — PROGRESS NOTES
SUBJECTIVE:   CC: Hardy is an 42 year old who presents for preventative health visit.   Patient has been advised of split billing requirements and indicates understanding: Yes  Healthy Habits:     Getting at least 3 servings of Calcium per day:  Yes    Bi-annual eye exam:  Yes    Dental care twice a year:  Yes    Sleep apnea or symptoms of sleep apnea:  None    Diet:  Regular (no restrictions)    Frequency of exercise:  1 day/week    Duration of exercise:  30-45 minutes    Taking medications regularly:  Yes    Medication side effects:  Not applicable    PHQ-2 Total Score: 0    Additional concerns today:  Yes    Hypertension.  Has had since 30s.  Dad has as well; started in his 20s.      No problems with medications.      Also: recently notices pulse more.  Home life been more stressful; daughter at [facility].  Wife in [facility].  Daughter doing better 14.    So wonders if baseline anxiety.  Has noticedd a few times that can feel pulse.  Has noticed it more in past when blood pressure was up.  Family has anxiety; he's never been treated for it.    BP Readings from Last 6 Encounters:   03/17/23 122/82   11/02/22 114/84   03/14/22 112/84   08/12/21 (!) 130/90   07/14/21 118/80   03/15/21 126/88     Doesn't happen with exercise.  Does occasionally have GERD and some chest discomfort with that  No racing.      No family history of MI -       Today's PHQ-2 Score:   PHQ-2 ( 1999 Pfizer) 3/16/2023   Q1: Little interest or pleasure in doing things 0   Q2: Feeling down, depressed or hopeless 0   PHQ-2 Score 0   PHQ-2 Total Score (12-17 Years)- Positive if 3 or more points; Administer PHQ-A if positive -   Q1: Little interest or pleasure in doing things Not at all   Q2: Feeling down, depressed or hopeless Not at all   PHQ-2 Score 0           Social History     Tobacco Use     Smoking status: Never     Smokeless tobacco: Never   Substance Use Topics     Alcohol use: Yes     Alcohol/week: 1.7 standard drinks     Comment:  "7-14 drinks a week          Alcohol Use 3/16/2023   Prescreen: >3 drinks/day or >7 drinks/week? Not Applicable   AUDIT SCORE  -       Last PSA: No results found for: PSA    Reviewed orders with patient. Reviewed health maintenance and updated orders accordingly - Yes      Reviewed and updated as needed this visit by clinical staff   Tobacco  Allergies  Meds  Problems  Med Hx  Surg Hx  Fam Hx          Reviewed and updated as needed this visit by Provider   Tobacco  Allergies  Meds  Problems  Med Hx  Surg Hx  Fam Hx             Review of Systems   Constitutional: Negative for chills and fever.   HENT: Negative for congestion, ear pain, hearing loss and sore throat.    Eyes: Negative for pain and visual disturbance.   Respiratory: Negative for cough and shortness of breath.    Cardiovascular: Negative for chest pain, palpitations and peripheral edema.   Gastrointestinal: Positive for abdominal pain and heartburn. Negative for constipation, diarrhea, hematochezia and nausea.   Genitourinary: Negative for dysuria, frequency, genital sores, hematuria, impotence, penile discharge and urgency.   Musculoskeletal: Negative for arthralgias, joint swelling and myalgias.   Skin: Negative for rash.   Neurological: Negative for dizziness, weakness, headaches and paresthesias.   Psychiatric/Behavioral: Negative for mood changes. The patient is not nervous/anxious.          OBJECTIVE:   /82 (BP Location: Right arm, Patient Position: Sitting, Cuff Size: Adult Regular)   Pulse 73   Temp 97.5  F (36.4  C) (Temporal)   Resp 18   Ht 1.772 m (5' 9.76\")   Wt 79.3 kg (174 lb 14.4 oz)   SpO2 98%   BMI 25.27 kg/m      Physical Exam  GENERAL: healthy, alert and no distress  EYES: Eyes grossly normal to inspection, PERRL and conjunctivae and sclerae normal  HENT: ear canals and TM's normal, nose and mouth without ulcers or lesions  NECK: no adenopathy, no asymmetry, masses, or scars and thyroid normal to " "palpation  RESP: lungs clear to auscultation - no rales, rhonchi or wheezes  CV: regular rate and rhythm, normal S1 S2, no S3 or S4, no murmur, click or rub, no peripheral edema and peripheral pulses strong  ABDOMEN: soft, nontender, no hepatosplenomegaly, no masses and bowel sounds normal  MS: no gross musculoskeletal defects noted, no edema  SKIN: no suspicious lesions or rashes  NEURO: Normal strength and tone, mentation intact and speech normal  PSYCH: mentation appears normal, affect normal/bright    Diagnostic Test Results:  Labs reviewed in Epic    ASSESSMENT/PLAN:   Greg was seen today for physical and covid concern.    Diagnoses and all orders for this visit:    Routine general medical examination at a health care facility  -     Lipid panel reflex to direct LDL Fasting; Future    Essential hypertension  Comments:  controlled on lisinopril-hctz  check bmp today and yearly  refilled  Orders:  -     lisinopril-hydrochlorothiazide (ZESTORETIC) 20-12.5 MG tablet; Take 1 tablet by mouth daily  -     Basic metabolic panel  (Ca, Cl, CO2, Creat, Gluc, K, Na, BUN); Future    Other orders  -     REVIEW OF HEALTH MAINTENANCE PROTOCOL ORDERS  -     HEPATITIS B VACCINE ADULT 3 DOSE IM (ENGERIX-B/RECOMBIVAX HB)  -     Cancel: COVID-19,PF,PFIZER BOOSTER BIVALENT (12+YRS)      We also discussed some sensation of \"noticing heart beat\" without elevated rate or chest pain.  If persists, worsens, had HR >120 at rest or irregular heartbeat let me know and we'll do ziopatch.    Also discussed if chest pain with exercise let me know and we'll order stress test.    Also discussed CT calcium coronary to stratify cardiac risk; no strong family history, decided against today but might in future.    Also has history of reflux, but had normal endoscopy.  Worsened with empty stomach, onions, tomatoes.  Could consider 2-4 wk trial of omeprazole to see if settles things, but wouldn't recommend long term.      Patient has been advised " of split billing requirements and indicates understanding: Yes      COUNSELING:   Reviewed preventive health counseling, as reflected in patient instructions        He reports that he has never smoked. He has never used smokeless tobacco.        Albina Bedoya MD  [facility]

## 2023-04-19 DIAGNOSIS — I10 ESSENTIAL HYPERTENSION: ICD-10-CM

## 2023-04-20 RX ORDER — LISINOPRIL AND HYDROCHLOROTHIAZIDE 12.5; 2 MG/1; MG/1
1 TABLET ORAL DAILY
Qty: 90 TABLET | Refills: 3 | OUTPATIENT
Start: 2023-04-20

## 2023-05-02 ENCOUNTER — MYC MEDICAL ADVICE (OUTPATIENT)
Dept: FAMILY MEDICINE | Facility: CLINIC | Age: 43
End: 2023-05-02
Payer: COMMERCIAL

## 2023-05-02 DIAGNOSIS — J45.990 EXERCISE-INDUCED BRONCHOSPASM: ICD-10-CM

## 2023-05-04 RX ORDER — ALBUTEROL SULFATE 90 UG/1
2 AEROSOL, METERED RESPIRATORY (INHALATION) EVERY 6 HOURS PRN
Qty: 6.7 G | Refills: 11 | Status: SHIPPED | OUTPATIENT
Start: 2023-05-04 | End: 2023-05-10

## 2023-05-04 NOTE — TELEPHONE ENCOUNTER
Routing refill request to provider for review/approval because:  No asthma control assessment score within normal limits in last 6 months.        3/15/2021     8:55 AM 3/14/2022     8:52 AM 11/1/2022    12:05 PM   ACT Total Scores   ACT TOTAL SCORE (Goal Greater than or Equal to 20) 25 24 25   In the past 12 months, how many times did you visit the emergency room for your asthma without being admitted to the hospital? 0 0 0   In the past 12 months, how many times were you hospitalized overnight because of your asthma? 0 0 0     Last Written Prescription Date:  3/15/21  Last Fill Quantity: 6.7g,  # refills: 11   Last office visit: 3/17/2023 with Aureliano (for covid). Med originally prescribed by another provider.   Future Office Visit:      Nova Diop RN  [facility]

## 2023-05-10 RX ORDER — ALBUTEROL SULFATE 90 UG/1
2 AEROSOL, METERED RESPIRATORY (INHALATION) EVERY 6 HOURS PRN
Qty: 18 G | Refills: 11 | Status: SHIPPED | OUTPATIENT
Start: 2023-05-10

## 2023-06-14 DIAGNOSIS — J30.2 SEASONAL ALLERGIC RHINITIS, UNSPECIFIED TRIGGER: ICD-10-CM

## 2023-06-18 RX ORDER — FLUTICASONE PROPIONATE 50 MCG
1 SPRAY, SUSPENSION (ML) NASAL DAILY
Qty: 16 G | Refills: 8 | Status: SHIPPED | OUTPATIENT
Start: 2023-06-18

## 2023-06-18 NOTE — TELEPHONE ENCOUNTER
"Last Written Prescription Date:  5/12/22  Last Fill Quantity: 16,  # refills: 11   Last office visit provider:  3/17/23     Requested Prescriptions   Pending Prescriptions Disp Refills     fluticasone (FLONASE) 50 MCG/ACT nasal spray 16 g 11     Sig: Spray 1 spray into both nostrils daily       Nasal Allergy Protocol Passed - 6/14/2023  8:20 AM        Passed - Patient is age 12 or older        Passed - Recent (12 mo) or future (30 days) visit within the authorizing provider's specialty     Patient has had an office visit with the authorizing provider or a provider within the authorizing providers department within the previous 12 mos or has a future within next 30 days. See \"Patient Info\" tab in inbasket, or \"Choose Columns\" in Meds & Orders section of the refill encounter.              Passed - Medication is active on med Isela Crockett RN 06/18/23 2:26 PM  "

## 2023-09-29 ENCOUNTER — E-VISIT (OUTPATIENT)
Dept: FAMILY MEDICINE | Facility: CLINIC | Age: 43
End: 2023-09-29
Payer: COMMERCIAL

## 2023-09-29 ENCOUNTER — APPOINTMENT (OUTPATIENT)
Dept: LAB | Facility: CLINIC | Age: 43
End: 2023-09-29
Attending: PHYSICIAN ASSISTANT
Payer: COMMERCIAL

## 2023-09-29 DIAGNOSIS — J02.9 SORE THROAT: Primary | ICD-10-CM

## 2023-09-29 LAB
DEPRECATED S PYO AG THROAT QL EIA: NEGATIVE
GROUP A STREP BY PCR: NOT DETECTED

## 2023-09-29 PROCEDURE — 99207 PR NO CHARGE LOS: CPT | Performed by: PHYSICIAN ASSISTANT

## 2023-09-29 PROCEDURE — 87651 STREP A DNA AMP PROBE: CPT | Performed by: PHYSICIAN ASSISTANT

## 2023-12-14 ASSESSMENT — ASTHMA QUESTIONNAIRES: ACT_TOTALSCORE: 25

## 2023-12-20 NOTE — PATIENT INSTRUCTIONS
"Prostate concerns:  Labs today  Let me know if symptoms worsen and I can refer you to urology  Lifestyle modifications include:  ?Limiting fluid intake before bedtime or prior to travel  ?Limiting intake of mild diuretics (eg, caffeine, alcohol)  ?Limiting intake of bladder irritants (eg, highly seasoned or irritative foods)  ?Avoiding constipation  ?Increasing activity, including regular strenuous exercise    Additional behavioral interventions include:  ?Timed voiding regimens - Attempt to empty their bladder based on a time interval rather than by the usual sensations. Urinate \"by the clock\" (every 90 to 120 minutes during the daytime).  ?Double-voiding techniques - Similarly, men who complain of obstructive symptoms may benefit by following one urination by a second attempt at emptying (the double void) within a minute or two of the initial void, which can be used in combination with timed voiding.  ?Pelvic floor muscle training - Kegel exercises at time of urinary urgency and pelvic floor muscle training, including the use of biofeedback, may be helpful for patients with urgency symptoms that do not respond to voiding interventions.       Effects of Feminizing Hormone Therapy: When They Happen      * If you are on feminizing hormones, your body is still able to make sperm, so your partner could still get pregnant. Feminizing hormones are not birth control. Some people choose to stop feminizing hormones with the goal of having a baby. You may then choose to go back to feminizing hormones therapy at any time. The effect of feminizing therapy on the amount and quality of sperm varies and is unknown.   **Electrolysis, laser treatments, or both can remove facial, armpit, and pubic hair. In most cases, these changes are permanent.          Informed Consent Form for Feminizing Medications    This form refers to the use of estrogen, progesterone, and/or androgen antagonists (sometimes called  testosterone suppressant , "  anti-androgens  or  androgen-blockers) by persons who wish to feminize their body. While there are risks associated with taking feminizing medications, when appropriately prescribed they can improve mental health and quality of life. Please seek another opinion if you want additional perspective on any aspect of your care.    Feminizing Effects      I understand that estrogen, progesterone, androgen antagonists, or a combination may be prescribed to feminize my body:  Skin may become softer  Muscle mass may decrease  Body hair may decrease  Fat may redistribute from abdomen to buttocks and thighs    2.     I understand that feminizing effects of estrogen and androgen antagonists can take several months to a year to become noticable, speed and degree of change is unpredictable, and is different for every person     3.     I understand that if I am taking estrogen I will probably develop breasts, and:   Breasts may take several years to develop to their full size.  Even if estrogen is stopped, the breast tissue that has developed will remain.  As soon as breasts start growing, it is recommended to have an annual brest exam.  There may be milky nipple discharge (galactorrhea). If you develop this, it is advised to check with a doctor to determine the cause.  It is not known if taking estrogen increases the risk of breast cancer.     4.    I understand that the following changes may occur if I stop taking feminizing medications:     Skin may become rougher   Muscle mass increases and there may be an increase in upper body strength  Body hair growth may become more noticable and grow more quickly   Male pattern baldness may develop more quickly, and hair that has already been lost will likely not grow back.  Fat may redistribute back to a more masculine pattern (increased in abdomen, decreased on buttocks/hips/thighs)    5.    I understand that taking feminizing medications will make my testicles produce less  testosterone, which can affect my overall sexual function:  Fertility may be impaired, I may become sterile, and going off feminizing medications may not bring back my ability to have biological children. I should consider sperm banking if I desire biological children.  Sperm may not mature, leading to reduced fertility. However, it is still possible to get someone pregnant and contraception should be used if needed.  Testicles may shrink by 25-50%. Testicular examinations are still recommended in young people to check for masses.  The amount of fluid ejactulated may be reduced.  There is typically a decrease in morning and spontaneous erections.  Erections may not be firm enough for penetrative sex.   Libido (sex drive) may decrease.    6.     I understand that there are some aspects of my body that are not significantly changed by feminizing medications  Beard/facial hair may grow more slowly and be less noticeable, but will not go away.  Voice pitch will not rise and speech patterns will not become more feminine (for example, feminine patterns of enunciation, body language, and phonation).   The laryngeal prominence ( Matthew s apple ) will not shrink.    7.     I understand that there are other non-medical options that can be helpful for presenting more feminine, and I may ask for additional resources about these.  Breast forms  Packers and tucking  Speech therapy for adopting feminine vocal patterns  Hair removal     Risks of Feminizing Medications    I understand that the medical effects and safety of feminizing medications are not fully understood, and that there may be long-term risks that are not yet known.     I understand that I am strongly advised not to take more medications than I am prescribed, as this increases health risks. It won t help changes come faster or increase the degree of changes.    I understand that feminizing medications can damage the liver. I have been advised that I should be monitored  for possible liver damage as long as I am taking feminizing medications.     I understand that feminizing medications will result in changes that will be noticeable by other people, and that some people in similar circumstances have experienced harassment, discrimination, and violence, while others have lost support of loved ones. I have been advised that referrals can be made for support/counseling if this would be helpful.     I understand that taking estrogen increases the risk of blood clots, which can result in:   pulmonary embolism (blood clot to the lungs), which may cause death  stroke, which may cause permanent brain damage or death  heart attack  chronic leg vein problems    6.   I understand that the risk of blood clots is much worse if I smoke cigarettes, especially over age 40. I understand that the danger is so high that I should stop smoking completely if I start taking estrogen. I can ask my doctor for advice on quitting.    7.   I understand that taking estrogen can increase deposits of fat around my internal organs, which is associated with increased risk for diabetes and heart disease.    8.   I understand that taking estrogen can cause increased blood pressure, estrogen increases the risk of gallstones, estrogen can cause headaches or migraines and can cause nausea and vomiting. I have been advised that if I develop these, it is recommended that I discuss this with my doctor.    9.   I understand that it is not known if taking estrogen increases the risk of non-cancerous tumors of the pituitary gland in the brain. I have been informed that although this is typically not life-threatening, it can damage vision. I understand that this will be monitored.    10. I have been informed that I am more likely to have dangerous side effects from estrogen if I smoke, I carry extra weight, am over 40 years old, or  have a history of blood clots, high blood pressure, or a family history of breast  cancer.    11. I have been informed that if I take too much estrogen, my body may convert it into testosterone, which may slow or stop feminization.    Medical Risks Associated with Androgen Blockers    1.   I have been informed that spironolactone (a testosterone suppressant) affects the balance of water and salts in the kidneys, and that this may:  Increase the amount of urine produced, and I may urinate more frequently for about 2 weeks  Reduce blood pressure  Rarely, cause high levels of potassium in the blood, and this will be monitored    2.   I understand that some androgen antagonists make it more difficult to evaluate the results of PSA (prostate) test. If I am over 50, I should have my prostate evaluated every year.    Prevention of Medical Complications    1.   I agree to take feminizing medications as prescribed and to tell my care provider if I am not happy with the treatment or am experiencing any problems.    2.   I understand that the right dose or type of medication prescribed for me may not be the same as for someone else.    3.   I understand that physical examinations and blood tests are needed on a regular basis (monthly, at first) to check for negative side effects of feminizing medications.    4.   I understand that feminization medications can interact with other medication (including other sources of hormones), dietary supplements, herbs, alcohol, and street drugs. I understand that being honest with my care provider about what else I am taking will help prevent medical complications that could be life-threatening. I have been informed that I will continue to get medical care no matter what information I share, and health care providers cannot legally disclose information about a patient s drug use to law enforcement.    5.   I understand that some medical conditions make it dangerous to take estrogen or androgen antagonists. I agree to be checked for risky conditions before the decision to  start or continue feminizing medication is made.    6.   I understand that I can choose to stop taking feminizing medication at any time, and that it is advised that I do this with the help of my doctor to make sure there are no negative reactions to stopping. I understand that my doctor may suggest I reduce, switch or stop taking feminizing medication, if there are severe health risks that can t be controlled.

## 2023-12-21 ENCOUNTER — OFFICE VISIT (OUTPATIENT)
Dept: FAMILY MEDICINE | Facility: CLINIC | Age: 43
End: 2023-12-21
Payer: COMMERCIAL

## 2023-12-21 ENCOUNTER — DOCUMENTATION ONLY (OUTPATIENT)
Dept: FAMILY MEDICINE | Facility: CLINIC | Age: 43
End: 2023-12-21

## 2023-12-21 VITALS
WEIGHT: 166.5 LBS | BODY MASS INDEX: 24.66 KG/M2 | SYSTOLIC BLOOD PRESSURE: 132 MMHG | HEART RATE: 71 BPM | DIASTOLIC BLOOD PRESSURE: 82 MMHG | RESPIRATION RATE: 18 BRPM | HEIGHT: 69 IN | TEMPERATURE: 98.8 F | OXYGEN SATURATION: 99 %

## 2023-12-21 DIAGNOSIS — Z13.220 SCREENING FOR LIPID DISORDERS: ICD-10-CM

## 2023-12-21 DIAGNOSIS — F64.0 GENDER DYSPHORIA IN ADULT: ICD-10-CM

## 2023-12-21 DIAGNOSIS — R39.15 URINARY URGENCY: Primary | ICD-10-CM

## 2023-12-21 DIAGNOSIS — Z13.1 SCREENING FOR DIABETES MELLITUS: ICD-10-CM

## 2023-12-21 DIAGNOSIS — N62 GYNECOMASTIA: ICD-10-CM

## 2023-12-21 LAB
ALBUMIN SERPL BCG-MCNC: 4.3 G/DL (ref 3.5–5.2)
ALBUMIN UR-MCNC: 30 MG/DL
ALP SERPL-CCNC: 56 U/L (ref 40–150)
ALT SERPL W P-5'-P-CCNC: 21 U/L (ref 0–70)
ANION GAP SERPL CALCULATED.3IONS-SCNC: 8 MMOL/L (ref 7–15)
APPEARANCE UR: CLEAR
AST SERPL W P-5'-P-CCNC: 25 U/L (ref 0–45)
BACTERIA #/AREA URNS HPF: ABNORMAL /HPF
BILIRUB SERPL-MCNC: 0.4 MG/DL
BILIRUB UR QL STRIP: NEGATIVE
BUN SERPL-MCNC: 10 MG/DL (ref 6–20)
CALCIUM SERPL-MCNC: 9.5 MG/DL (ref 8.6–10)
CHLORIDE SERPL-SCNC: 102 MMOL/L (ref 98–107)
CHOLEST SERPL-MCNC: 146 MG/DL
COLOR UR AUTO: YELLOW
CREAT SERPL-MCNC: 0.87 MG/DL (ref 0.67–1.17)
DEPRECATED HCO3 PLAS-SCNC: 29 MMOL/L (ref 22–29)
EGFRCR SERPLBLD CKD-EPI 2021: >90 ML/MIN/1.73M2
ERYTHROCYTE [DISTWIDTH] IN BLOOD BY AUTOMATED COUNT: 12.4 % (ref 10–15)
FASTING STATUS PATIENT QL REPORTED: YES
GLUCOSE SERPL-MCNC: 91 MG/DL (ref 70–99)
GLUCOSE UR STRIP-MCNC: NEGATIVE MG/DL
HBA1C MFR BLD: 4.9 % (ref 0–5.6)
HCT VFR BLD AUTO: 43.2 % (ref 40–53)
HDLC SERPL-MCNC: 63 MG/DL
HGB BLD-MCNC: 14.5 G/DL (ref 13.3–17.7)
HGB UR QL STRIP: ABNORMAL
KETONES UR STRIP-MCNC: NEGATIVE MG/DL
LDLC SERPL CALC-MCNC: 76 MG/DL
LEUKOCYTE ESTERASE UR QL STRIP: NEGATIVE
MCH RBC QN AUTO: 31 PG (ref 26.5–33)
MCHC RBC AUTO-ENTMCNC: 33.6 G/DL (ref 31.5–36.5)
MCV RBC AUTO: 92 FL (ref 78–100)
MUCOUS THREADS #/AREA URNS LPF: PRESENT /LPF
NITRATE UR QL: NEGATIVE
NONHDLC SERPL-MCNC: 83 MG/DL
PH UR STRIP: 6 [PH] (ref 5–7)
PLATELET # BLD AUTO: 174 10E3/UL (ref 150–450)
POTASSIUM SERPL-SCNC: 4.2 MMOL/L (ref 3.4–5.3)
PROT SERPL-MCNC: 7.3 G/DL (ref 6.4–8.3)
PSA SERPL DL<=0.01 NG/ML-MCNC: 0.62 NG/ML (ref 0–2.5)
RBC # BLD AUTO: 4.68 10E6/UL (ref 4.4–5.9)
RBC #/AREA URNS AUTO: ABNORMAL /HPF
SODIUM SERPL-SCNC: 139 MMOL/L (ref 135–145)
SP GR UR STRIP: 1.02 (ref 1–1.03)
SQUAMOUS #/AREA URNS AUTO: ABNORMAL /LPF
TRIGL SERPL-MCNC: 37 MG/DL
UROBILINOGEN UR STRIP-ACNC: 0.2 E.U./DL
WBC # BLD AUTO: 2.6 10E3/UL (ref 4–11)
WBC #/AREA URNS AUTO: ABNORMAL /HPF

## 2023-12-21 PROCEDURE — 36415 COLL VENOUS BLD VENIPUNCTURE: CPT | Performed by: STUDENT IN AN ORGANIZED HEALTH CARE EDUCATION/TRAINING PROGRAM

## 2023-12-21 PROCEDURE — 84403 ASSAY OF TOTAL TESTOSTERONE: CPT | Performed by: STUDENT IN AN ORGANIZED HEALTH CARE EDUCATION/TRAINING PROGRAM

## 2023-12-21 PROCEDURE — 81001 URINALYSIS AUTO W/SCOPE: CPT | Performed by: STUDENT IN AN ORGANIZED HEALTH CARE EDUCATION/TRAINING PROGRAM

## 2023-12-21 PROCEDURE — 83036 HEMOGLOBIN GLYCOSYLATED A1C: CPT | Performed by: STUDENT IN AN ORGANIZED HEALTH CARE EDUCATION/TRAINING PROGRAM

## 2023-12-21 PROCEDURE — G0103 PSA SCREENING: HCPCS | Performed by: STUDENT IN AN ORGANIZED HEALTH CARE EDUCATION/TRAINING PROGRAM

## 2023-12-21 PROCEDURE — 80061 LIPID PANEL: CPT | Performed by: STUDENT IN AN ORGANIZED HEALTH CARE EDUCATION/TRAINING PROGRAM

## 2023-12-21 PROCEDURE — 82670 ASSAY OF TOTAL ESTRADIOL: CPT | Performed by: STUDENT IN AN ORGANIZED HEALTH CARE EDUCATION/TRAINING PROGRAM

## 2023-12-21 PROCEDURE — 85027 COMPLETE CBC AUTOMATED: CPT | Performed by: STUDENT IN AN ORGANIZED HEALTH CARE EDUCATION/TRAINING PROGRAM

## 2023-12-21 PROCEDURE — 99215 OFFICE O/P EST HI 40 MIN: CPT | Performed by: STUDENT IN AN ORGANIZED HEALTH CARE EDUCATION/TRAINING PROGRAM

## 2023-12-21 PROCEDURE — 80053 COMPREHEN METABOLIC PANEL: CPT | Performed by: STUDENT IN AN ORGANIZED HEALTH CARE EDUCATION/TRAINING PROGRAM

## 2023-12-21 RX ORDER — SYRINGE, DISPOSABLE, 1 ML
SYRINGE, EMPTY DISPOSABLE MISCELLANEOUS
Qty: 25 EACH | Refills: 11 | Status: SHIPPED | OUTPATIENT
Start: 2023-12-21 | End: 2024-01-12

## 2023-12-21 RX ORDER — CONTAINER,EMPTY
EACH MISCELLANEOUS
Qty: 1 EACH | Refills: 11 | Status: SHIPPED | OUTPATIENT
Start: 2023-12-21

## 2023-12-21 RX ORDER — BLOOD SUGAR DIAGNOSTIC
1 STRIP MISCELLANEOUS WEEKLY
Qty: 100 EACH | Refills: 3 | Status: SHIPPED | OUTPATIENT
Start: 2023-12-21

## 2023-12-21 RX ORDER — ESTRADIOL VALERATE 20 MG/ML
2 INJECTION INTRAMUSCULAR WEEKLY
Qty: 5 ML | Refills: 0 | Status: SHIPPED | OUTPATIENT
Start: 2023-12-21 | End: 2024-03-20

## 2023-12-21 ASSESSMENT — PAIN SCALES - GENERAL: PAINLEVEL: MILD PAIN (2)

## 2023-12-21 NOTE — PROGRESS NOTES
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  He prefers to be seen next week if possible.  I sent his medications to our pharmacy.    Thanks!!!    ~Ramesh

## 2023-12-21 NOTE — PROGRESS NOTES
"  Assessment & Plan     Urinary urgency  Pt endorses intermittent urinary urgency, prostate discomfort, discomfort with ejaculation, dribbling, incomplete emptying. Suspect BPH, will check PSA, UA, BMP and A1C. Declines flomax or urology referral at this time  - UA Macroscopic with reflex to Microscopic and Culture  - PSA, screen  - UA Macroscopic with reflex to Microscopic and Culture  - PSA, screen  - UA Microscopic with Reflex to Culture    Gender dysphoria in adult  Gynecomastia  43yr assigned male at birth, prefers any/all pronouns, interested in feminizing therapy to align with gender identity/goals. Reviewed risks/benefits, timeline for effects, hormone options. No concerns with medical or mental health history. No contraindications to estrogen therapy. HTN is well controlled.    Plan:  -baseline labs today (A1c, cmp, cbc, lipid, total testosterone)  -gynecomastia already present, recent mammo normal-will check ultra-sensitive estradiol today  -start estradiol valerate, 2mg weekly   -will return for injection teaching with RN  -elected to NOT start testosterone blocker to support goals  -follow up in 3 months (will likely increase dose at that time)    - Hemoglobin A1c  - Comprehensive metabolic panel  - CBC with platelets  - Lipid panel  - Testosterone total  - Estradiol ultrasensitive  - estradiol valerate (DELESTROGEN) 20 MG/ML injection  Dispense: 5 mL; Refill: 0  - syringe, disposable, (B-D SYRINGE LUER-HERIBERTO) 1 ML MISC  Dispense: 25 each; Refill: 11  - Alcohol Swabs (ALCOHOL PADS) 70 % PADS  Dispense: 100 each; Refill: 3  - needle, disp, 18G X 1\" MISC  Dispense: 25 each; Refill: 11  - Needle, Disp, 25G X 5/8\" MISC  Dispense: 25 each; Refill: 11  - Sharps Container MISC  Dispense: 1 each; Refill: 11      Educated about 3 parts of evaluation before starting HRT  Physical health  Mental health  Informed consent    Medical safety for hormones  Lacks contraindications to hormonal therapy  Medication plan: " feminizing hormone therapy with estrogen  Administration route: subcutaneously    Next labs and exam    Recommended laboratory follow up:  Initiation: follow up in 3 months  Dose change: follow up in 1 month    Counseling completed today  Counselled patient about controlled substances, never share: Yes  Contraception: not needed  Educated about testosterone as absolute contraindication in pregnancy: N/A  Fertility plan if starts cross-sex hormones: N/A  Plan nurse visit for shot teaching once medication is in hand     Mental health assessment  Completed by myself today  Continue therapy   Diagnoses are stable and/or are likely to become stabilized by treatment of gender dysphoria    Informed consent process  Yes --- Is able to provide informed consent   Yes --- Likely to take hormones in a responsible manner  Yes --- Discussed physical effects, benefits, and risk assessment & modification  Yes --- Discussed the clinical and biochemical monitoring of hormonal changes and the potential impact on reproductive health & fertility      We discussed thoroughly the risks/benefits/alternatives of this treatment. Questions elicited and answered in reviewing the informed consent document.  Pt is fully prepared to start hormones and is able to reason through risks and management. Questions elicited and answered and patient verbally consents to hormone treatment.      Ramesh Sterling,     I spent a total of 50 minutes on the day of the visit.   Time spent by me doing chart review, history and exam, documentation and further activities per the note      Subjective   Greg is a 43 year old, presenting for the following health issues:  Prostate Problem (Pt has concerns of prostate as well as discuss Gender treatment )        12/21/2023     8:47 AM   Additional Questions   Roomed by Silvia   Accompanied by Self         12/21/2023     8:47 AM   Patient Reported Additional Medications   Patient reports taking the following new  medications None       History of Present Illness       Reason for visit:  Prostate concerns, etc.  Symptom onset:  More than a month  Symptoms include:  Urgent urination, incomplete void of the bladder, intermittant discomfort  Symptom intensity:  Mild  Symptom progression:  Staying the same  Had these symptoms before:  No  What makes it better:  No    He eats 2-3 servings of fruits and vegetables daily.He consumes 0 sweetened beverage(s) daily.He exercises with enough effort to increase his heart rate 30 to 60 minutes per day.  He exercises with enough effort to increase his heart rate 4 days per week.   He is taking medications regularly.     Prostate concerns:  Prostate discomfort (rectum), mild (intermittent), with ejaculation sometimes  Urgency  Dribbles   Incomplete emptying (however does relaxation technique to help with this)  Noticed symptoms months ago  Symptoms are tolerable, but uncomfortable  Declines flomax at this time    Seeking gender hormone treatment  Thinking of monotherapy with just estradiol   Already on blood pressure med, so would hold off on britney for now  Wanting to avoid T blocker  Physical changes: breast growth, dec body hair, fill out face/hips/buttocks  Social with friends (family does not know)  Going to therapy      ___________________      New Patient History and Physical, Gender-Nonconforming Patien    Name: Greg  Pronouns: socially adaptable (accepts all pronouns)    Seeking primary care, hormonal care      Gender Identity     How would you describe your internal gender identity?   Never had one, went with the flow  More feminine  In an ideal world, what physical characteristics would you want?   Breast growth, wide hips  If had magic button, would switch all physical features to feminine      Gender History     When did you first recognize that your body and identity didn t match?  18 months ago  What parts of your body or presentation make you feel uncomfortable or cause  dysphoria?  Penis/scrotum (tucking currently)  Beard  Body hair  Have you ever seen a healthcare provider for gender-affirming care?   No  Previous HRT: NO  Previous surgeries (where, surgeon name, year, and surgical intervention): None  Ever had problems with hormone treatment? N/A      Goals of Treatment     Interested in medically, legally, or socially transitioning?   Yes: medically  Desire to have any gender affirming surgery now or in the future?  Not at this time      Support Network     Have you shared this part of your identity with anyone?   Yes: friends, wife, and kids  Starting social transition with family  Do you have a support network or people in your life who help you feel affirmed?   Yes:   Are you out at work, school or home?   Not out at work      Social History     Living environment  Who lives in your household?   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   Denies  Do you feel threatened or controlled by a partner, ex-partner or anyone in your life?   Denies    Relationships  How do you describe your sexual or romantic orientation?   Other-gynophilic  Romantic or sexual partners include:   Wife, monogomous, cis female  Current partners number:   1  Current partners   have sperm? No   able to get pregnant? No   Fertility plans? No Interest in cryopreservation? No   Contraception? Wife had tubal ligation      Social History     Socioeconomic History    Marital status:      Spouse name: None    Number of children: None    Years of education: None    Highest education level: None   Occupational History    Occupation: pc Tech.     Comment: [employer]   Tobacco Use    Smoking status: Never     Passive exposure: Past    Smokeless tobacco: Never   Vaping Use    Vaping Use: Never used   Substance and Sexual Activity    Alcohol use: Yes      Alcohol/week: 1.7 standard drinks of alcohol     Comment: 7-14 drinks a week     Drug use: No    Sexual activity: Yes     Partners: Female     Birth control/protection: None   Other Topics Concern    Exercise Yes    Seat Belt Yes    Self-Exams Yes    Parent/sibling w/ CABG, MI or angioplasty before 65F 55M? No   Social History Narrative    PC consultant      Social Determinants of Health     Financial Resource Strain: Unknown (12/14/2023)    Financial Resource Strain     Within the past 12 months, have you or your family members you live with been unable to get utilities (heat, electricity) when it was really needed?: Patient refused   Food Insecurity: Unknown (12/14/2023)    Food Insecurity     Within the past 12 months, did you worry that your food would run out before you got money to buy more?: Patient refused     Within the past 12 months, did the food you bought just not last and you didn t have money to get more?: Patient refused   Transportation Needs: Unknown (12/14/2023)    Transportation Needs     Within the past 12 months, has lack of transportation kept you from medical appointments, getting your medicines, non-medical meetings or appointments, work, or from getting things that you need?: Patient refused   Interpersonal Safety: Low Risk  (12/21/2023)    Interpersonal Safety     Do you feel physically and emotionally safe where you currently live?: Yes     Within the past 12 months, have you been hit, slapped, kicked or otherwise physically hurt by someone?: No     Within the past 12 months, have you been humiliated or emotionally abused in other ways by your partner or ex-partner?: No   Housing Stability: Unknown (12/14/2023)    Housing Stability     Do you have housing? : Patient refused     Are you worried about losing your housing?: Patient refused       Sexual Health     Sexual concerns:    No  Hx of sexual abuse:   No   STI History:   None   Do you want STI screening today?   If yes, do 3 pt  screening for GC      Mental Health Assessment     Have you ever felt depressed because your gender identity and body don t match? Sharri at times  Chemical use history: Yes: social alcohol;  Mental Health diagnosis history  none  Mental health hospitalizations: No  History of suicide attempts? No   Current suicidal thoughts? No   Self harm  Past: No   Current: No  Gender therapist? Yes      Surgical History     Past Surgical History:   Procedure Laterality Date    ABDOMEN SURGERY  2003    Appendectomy    ESOPHAGOSCOPY, GASTROSCOPY, DUODENOSCOPY (EGD), COMBINED N/A 11/6/2019    Procedure: ESOPHAGOGASTRODUODENOSCOPY (EGD);  Surgeon: Patricia Shearer MD;  Location: [facility] OR    ORTHOPEDIC SURGERY  06/03    Knee arthroscopy (plica)    SURGICAL HISTORY OF -   7/03    right knee arthoscopy    ZZC APPENDECTOMY  1/04       Past Medical History     Patient Active Problem List   Diagnosis    Seasonal allergic rhinitis    Hypertension    Exercise-induced bronchospasm    Gynecomastia, male    Eczema    Dyspepsia    Gender dysphoria in adult     Past Medical History:   Diagnosis Date    Hypertension 2017    Genetic, father    NO ACTIVE PROBLEMS allergies    Skin disease 2017/18    Eczema    Stomach problems 6/2019    What I'm being seen for    Uncomplicated asthma 2015    Sports-induced    Urinary tract infection 12/2017    Viral warts 4/3/2008    Problem list name updated by automated process. Provider to review     Current Outpatient Medications   Medication Sig Dispense Refill    Alcohol Swabs (ALCOHOL PADS) 70 % PADS 1 Pad once a week 100 each 3    Cetirizine HCl (ZYRTEC ALLERGY PO) Take by mouth daily      estradiol valerate (DELESTROGEN) 20 MG/ML injection Inject 0.1 mLs (2 mg) Subcutaneous once a week 5 mL 0    fluticasone (FLONASE) 50 MCG/ACT nasal spray Spray 1 spray into both nostrils daily 16 g 8    lisinopril-hydrochlorothiazide (ZESTORETIC) 20-12.5 MG tablet Take 1 tablet by mouth daily 90 tablet 3    needle, disp, 18G  "X 1\" MISC Use to draw up weekly injection 25 each 11    Needle, Disp, 25G X 5/8\" MISC To use to inject hormones weekly 25 each 11    Sharps Container MISC To use to dispose of needles for weekly injection 1 each 11    syringe, disposable, (B-D SYRINGE LUER-HERIBERTO) 1 ML MISC To use with weekly injections 25 each 11    VENTOLIN  (90 Base) MCG/ACT inhaler Inhale 2 puffs into the lungs every 6 hours as needed for shortness of breath, wheezing or cough 18 g 11     History   Smoking Status    Never   Smokeless Tobacco    Never     Allergies   Allergen Reactions    Beta Adrenergic Blockers Angioedema    Penicillins          Family History   HTN  YES parents  History of clots in family (DVT, PE) No     Family History   Problem Relation Age of Onset    Depression Paternal Grandmother         Not 100%    Hypertension Father     Family History Negative Mother     Other Cancer Mother         Pancreatic    Prostate Cancer Maternal Grandfather     Coronary Artery Disease Paternal Grandfather     Hypertension Paternal Grandfather     Substance Abuse Brother       Review of Systems   Constitutional, HEENT, cardiovascular, pulmonary, gi and gu systems are negative, except as otherwise noted.        Objective    /82 (BP Location: Right arm, Patient Position: Sitting, Cuff Size: Adult Regular)   Pulse 71   Temp 98.8  F (37.1  C) (Temporal)   Resp 18   Ht 1.754 m (5' 9.06\")   Wt 75.5 kg (166 lb 8 oz)   SpO2 99%   BMI 24.55 kg/m    Body mass index is 24.55 kg/m .    Physical Exam  Constitutional:       General: He is not in acute distress.     Appearance: He is not ill-appearing.   Eyes:      Extraocular Movements: Extraocular movements intact.      Conjunctiva/sclera: Conjunctivae normal.      Pupils: Pupils are equal, round, and reactive to light.   Pulmonary:      Effort: Pulmonary effort is normal.   Neurological:      General: No focal deficit present.      Mental Status: He is alert and oriented to person, place, " and time.   Psychiatric:         Mood and Affect: Mood normal.         Behavior: Behavior normal.

## 2023-12-22 ENCOUNTER — MYC MEDICAL ADVICE (OUTPATIENT)
Dept: FAMILY MEDICINE | Facility: CLINIC | Age: 43
End: 2023-12-22
Payer: COMMERCIAL

## 2023-12-22 DIAGNOSIS — R31.21 ASYMPTOMATIC MICROSCOPIC HEMATURIA: Primary | ICD-10-CM

## 2023-12-25 LAB — TESTOST SERPL-MCNC: 526 NG/DL (ref 240–950)

## 2023-12-26 NOTE — TELEPHONE ENCOUNTER
" -- Please advise.     \"I was looking at the WBC/RBC & Mucus in the UA and was running through the permutations of what might cause those to be. Could the immune response to the vaccine have had any impact (I had a high grade fever and all that)? Maybe a UTI (might explain some of the recent urination urgency)?     When you state \"on our next visit\", do you mean the presently scheduled one in March or try to get labs in next week? If March, if symptoms worsen (unexplained fever, painful urination/bladder, etc.), get an appointment. If next week, do I need to schedule those labs along with my injection training on Weds?\"    Nova Diop RN  [facility]    "

## 2023-12-26 NOTE — TELEPHONE ENCOUNTER
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 Can you add on a lab only visit to recheck his urine?    Thanks!    ~Dr. Sterling

## 2023-12-27 ENCOUNTER — ALLIED HEALTH/NURSE VISIT (OUTPATIENT)
Dept: FAMILY MEDICINE | Facility: CLINIC | Age: 43
End: 2023-12-27
Payer: COMMERCIAL

## 2023-12-27 DIAGNOSIS — Z71.89 ENCOUNTER FOR EDUCATION ABOUT INJECTION: Primary | ICD-10-CM

## 2023-12-27 DIAGNOSIS — R31.21 ASYMPTOMATIC MICROSCOPIC HEMATURIA: ICD-10-CM

## 2023-12-27 LAB
ALBUMIN UR-MCNC: NEGATIVE MG/DL
APPEARANCE UR: CLEAR
BILIRUB UR QL STRIP: NEGATIVE
COLOR UR AUTO: YELLOW
GLUCOSE UR STRIP-MCNC: NEGATIVE MG/DL
HGB UR QL STRIP: NEGATIVE
KETONES UR STRIP-MCNC: NEGATIVE MG/DL
LEUKOCYTE ESTERASE UR QL STRIP: NEGATIVE
NITRATE UR QL: NEGATIVE
PH UR STRIP: 6.5 [PH] (ref 5–7)
SP GR UR STRIP: 1.01 (ref 1–1.03)
UROBILINOGEN UR STRIP-ACNC: 0.2 E.U./DL

## 2023-12-27 PROCEDURE — 99207 PR NO CHARGE NURSE ONLY: CPT

## 2023-12-27 PROCEDURE — 87086 URINE CULTURE/COLONY COUNT: CPT

## 2023-12-27 PROCEDURE — 81003 URINALYSIS AUTO W/O SCOPE: CPT

## 2023-12-27 NOTE — PROGRESS NOTES
RN provided injection teaching of estradiol subcutaneous injections per order of Dr. Sterling.    Patient was able to self-administer the subcutaneous injection with RN instructions and supervision today without complications.    RN provided subcutaneous injection teaching pamphlet from [employer].     Patient provided teachback understanding and had no further questions.     ESTHER EdwardsN MAYURI  [facility]

## 2023-12-28 LAB — BACTERIA UR CULT: NORMAL

## 2023-12-29 LAB — ESTRADIOL SERPL HS-MCNC: 26 PG/ML (ref 10–40)

## 2024-03-19 RX ORDER — CLOBETASOL PROPIONATE 0.5 MG/G
OINTMENT TOPICAL
COMMUNITY
Start: 2023-11-02

## 2024-03-19 RX ORDER — TRIAMCINOLONE ACETONIDE 1 MG/G
CREAM TOPICAL
COMMUNITY
Start: 2023-11-02

## 2024-03-19 SDOH — HEALTH STABILITY: PHYSICAL HEALTH: ON AVERAGE, HOW MANY DAYS PER WEEK DO YOU ENGAGE IN MODERATE TO STRENUOUS EXERCISE (LIKE A BRISK WALK)?: 5 DAYS

## 2024-03-19 SDOH — HEALTH STABILITY: PHYSICAL HEALTH: ON AVERAGE, HOW MANY MINUTES DO YOU ENGAGE IN EXERCISE AT THIS LEVEL?: 30 MIN

## 2024-03-19 ASSESSMENT — SOCIAL DETERMINANTS OF HEALTH (SDOH): HOW OFTEN DO YOU GET TOGETHER WITH FRIENDS OR RELATIVES?: TWICE A WEEK

## 2024-03-20 ENCOUNTER — ORDERS ONLY (AUTO-RELEASED) (OUTPATIENT)
Dept: FAMILY MEDICINE | Facility: CLINIC | Age: 44
End: 2024-03-20

## 2024-03-20 ENCOUNTER — OFFICE VISIT (OUTPATIENT)
Dept: FAMILY MEDICINE | Facility: CLINIC | Age: 44
End: 2024-03-20
Payer: COMMERCIAL

## 2024-03-20 ENCOUNTER — MYC MEDICAL ADVICE (OUTPATIENT)
Dept: FAMILY MEDICINE | Facility: CLINIC | Age: 44
End: 2024-03-20

## 2024-03-20 VITALS
WEIGHT: 168.4 LBS | HEART RATE: 70 BPM | BODY MASS INDEX: 24.94 KG/M2 | DIASTOLIC BLOOD PRESSURE: 87 MMHG | OXYGEN SATURATION: 100 % | SYSTOLIC BLOOD PRESSURE: 131 MMHG | RESPIRATION RATE: 15 BRPM | TEMPERATURE: 97.9 F | HEIGHT: 69 IN

## 2024-03-20 DIAGNOSIS — M10.9 ACUTE GOUT INVOLVING TOE, UNSPECIFIED CAUSE, UNSPECIFIED LATERALITY: ICD-10-CM

## 2024-03-20 DIAGNOSIS — F64.0 GENDER DYSPHORIA IN ADULT: ICD-10-CM

## 2024-03-20 DIAGNOSIS — I10 ESSENTIAL HYPERTENSION: ICD-10-CM

## 2024-03-20 DIAGNOSIS — Z00.00 ROUTINE GENERAL MEDICAL EXAMINATION AT A HEALTH CARE FACILITY: Primary | ICD-10-CM

## 2024-03-20 DIAGNOSIS — Z12.11 SCREEN FOR COLON CANCER: ICD-10-CM

## 2024-03-20 DIAGNOSIS — H61.23 EXCESSIVE CERUMEN IN BOTH EAR CANALS: ICD-10-CM

## 2024-03-20 DIAGNOSIS — J45.990 EXERCISE-INDUCED ASTHMA: ICD-10-CM

## 2024-03-20 PROCEDURE — 69209 REMOVE IMPACTED EAR WAX UNI: CPT | Mod: 50 | Performed by: STUDENT IN AN ORGANIZED HEALTH CARE EDUCATION/TRAINING PROGRAM

## 2024-03-20 PROCEDURE — 90471 IMMUNIZATION ADMIN: CPT | Performed by: STUDENT IN AN ORGANIZED HEALTH CARE EDUCATION/TRAINING PROGRAM

## 2024-03-20 PROCEDURE — 99396 PREV VISIT EST AGE 40-64: CPT | Mod: 25 | Performed by: STUDENT IN AN ORGANIZED HEALTH CARE EDUCATION/TRAINING PROGRAM

## 2024-03-20 PROCEDURE — 99214 OFFICE O/P EST MOD 30 MIN: CPT | Mod: 25 | Performed by: STUDENT IN AN ORGANIZED HEALTH CARE EDUCATION/TRAINING PROGRAM

## 2024-03-20 PROCEDURE — 90686 IIV4 VACC NO PRSV 0.5 ML IM: CPT | Performed by: STUDENT IN AN ORGANIZED HEALTH CARE EDUCATION/TRAINING PROGRAM

## 2024-03-20 PROCEDURE — 90472 IMMUNIZATION ADMIN EACH ADD: CPT | Performed by: STUDENT IN AN ORGANIZED HEALTH CARE EDUCATION/TRAINING PROGRAM

## 2024-03-20 PROCEDURE — 90677 PCV20 VACCINE IM: CPT | Performed by: STUDENT IN AN ORGANIZED HEALTH CARE EDUCATION/TRAINING PROGRAM

## 2024-03-20 PROCEDURE — 90746 HEPB VACCINE 3 DOSE ADULT IM: CPT | Performed by: STUDENT IN AN ORGANIZED HEALTH CARE EDUCATION/TRAINING PROGRAM

## 2024-03-20 RX ORDER — COLCHICINE 0.6 MG/1
TABLET ORAL
Qty: 30 TABLET | Refills: 0 | Status: SHIPPED | OUTPATIENT
Start: 2024-03-20

## 2024-03-20 RX ORDER — ESTRADIOL VALERATE 20 MG/ML
4 INJECTION INTRAMUSCULAR WEEKLY
Qty: 5 ML | Refills: 0 | Status: SHIPPED | OUTPATIENT
Start: 2024-03-20 | End: 2024-06-28

## 2024-03-20 RX ORDER — LISINOPRIL AND HYDROCHLOROTHIAZIDE 12.5; 2 MG/1; MG/1
1 TABLET ORAL DAILY
Qty: 90 TABLET | Refills: 3 | Status: SHIPPED | OUTPATIENT
Start: 2024-03-20 | End: 2024-08-02

## 2024-03-20 ASSESSMENT — ASTHMA QUESTIONNAIRES
ACT_TOTALSCORE: 25
QUESTION_2 LAST FOUR WEEKS HOW OFTEN HAVE YOU HAD SHORTNESS OF BREATH: NOT AT ALL
QUESTION_5 LAST FOUR WEEKS HOW WOULD YOU RATE YOUR ASTHMA CONTROL: COMPLETELY CONTROLLED
QUESTION_3 LAST FOUR WEEKS HOW OFTEN DID YOUR ASTHMA SYMPTOMS (WHEEZING, COUGHING, SHORTNESS OF BREATH, CHEST TIGHTNESS OR PAIN) WAKE YOU UP AT NIGHT OR EARLIER THAN USUAL IN THE MORNING: NOT AT ALL
QUESTION_4 LAST FOUR WEEKS HOW OFTEN HAVE YOU USED YOUR RESCUE INHALER OR NEBULIZER MEDICATION (SUCH AS ALBUTEROL): NOT AT ALL
QUESTION_1 LAST FOUR WEEKS HOW MUCH OF THE TIME DID YOUR ASTHMA KEEP YOU FROM GETTING AS MUCH DONE AT WORK, SCHOOL OR AT HOME: NONE OF THE TIME
ACT_TOTALSCORE: 25

## 2024-03-20 ASSESSMENT — PAIN SCALES - GENERAL: PAINLEVEL: NO PAIN (0)

## 2024-03-20 NOTE — TELEPHONE ENCOUNTER
See RN response to patient's mychart message re:ear wax    ESTHER TongN RN  [facility]

## 2024-03-20 NOTE — PROGRESS NOTES
"Preventive Care Visit  [facility]  Ramesh Sterling DO, Family Medicine  Mar 20, 2024      Assessment & Plan     Routine general medical examination at a health care facility  -Vitals: WNL  -Labs: UTD  -Immunizations: pneumonia, flu, hep b  -Colon cancer screening: cologuard ordered    Screen for colon cancer  - COLOGUARD([employer])    Gender dysphoria in adult  43yr assigned male at birth, prefers any/all pronouns. Started estrogen monotherapy 12/2023 with IM estradiol valerate 2mg weekly (shot day Wed). Noticing fat redistribution, breast tenderness, positive mood changes. No negative side effects. Will increase estrogen to 4mg weekly. Return for mid-cycle labs (estradiol, total testosterone, hgb). Prefers to avoid T blocker to support goals and sexual health. Follow up in 3 months.    - Estradiol  - Testosterone, total  - Hemoglobin  - estradiol valerate (DELESTROGEN) 20 MG/ML injection  Dispense: 5 mL; Refill: 0    Essential hypertension  BP at goal <140/90. Continue medication below. Renal function normal, microalbumin ordered.  - lisinopril-hydrochlorothiazide (ZESTORETIC) 20-12.5 MG tablet  Dispense: 90 tablet; Refill: 3  - microalbumin     Acute gout involving toe, unspecified cause, unspecified laterality  Recent flare in toe, which is rare. Likely precipitated by stress. Will send colchicine to use if develops another flare. Uric acid ordered.  - colchicine (COLCRYS) 0.6 MG tablet  Dispense: 30 tablet; Refill: 0  - Uric acid    Excessive cerumen in both ear canals  S/p earwash, improved.    Exercise induced asthma  Stable, well controlled. Using albuterol PRN.    BMI  Estimated body mass index is 25.05 kg/m  as calculated from the following:    Height as of this encounter: 1.746 m (5' 8.75\").    Weight as of this encounter: 76.4 kg (168 lb 6.4 oz).   Weight management plan: Discussed healthy diet and exercise guidelines    Counseling  Appropriate preventive services were " discussed with this patient, including applicable screening as appropriate for fall prevention, nutrition, physical activity, Tobacco-use cessation, weight loss and cognition.  Checklist reviewing preventive services available has been given to the patient.  Reviewed patient's diet, addressing concerns and/or questions.   He is at risk for psychosocial distress and has been provided with information to reduce risk.       Nakia Garza is a 43 year old, presenting for the following:  Physical and Recheck Medication        3/20/2024     7:49 AM   Additional Questions   Roomed by Tari WHITE        Health Care Directive  Patient does not have a Health Care Directive or Living Will: Discussed advance care planning with patient; information given to patient to review.    HPI    Exercise-3x/week, peloton  Colon cancer screening-DUE next month    Asthma   -albuterol prn  -stable    HTN  -lisinopril-hydrochlorothiazide 20-12.5    Gender dysphoria  -pronouns any/all  -preferred name: kathleen  -estradiol 2mg weekly (started 12/2023)  -not on T blocker due to goals    Consider increasing dose    Estrogen going well  Shot day-Wednesday    Recent gout flare up with stress        11/1/2022    12:05 PM 12/14/2023     9:23 AM 3/20/2024     7:49 AM   ACT Total Scores   ACT TOTAL SCORE (Goal Greater than or Equal to 20) 25 25 25   In the past 12 months, how many times did you visit the emergency room for your asthma without being admitted to the hospital? 0 0 0   In the past 12 months, how many times were you hospitalized overnight because of your asthma? 0 0 0            Review of Systems:        General  Fat redistribution: YES  Weight change: YES HEENT  Voice change: No     Cardiovascular (CV)  Chest Pains: No  Shortness of breath: No Chest  Decreased exercise tolerance:  No  Breast changes/development: YES     Gastrointestinal (GI)  Abdominal pain: No  Change in appetite: No Skin  Acne or oily skin: No  Change in hair: YES  "    Genitourinary ()  Abnormal vaginal bleeding: N/A   Decreased spontaneous erections: No  Change in libido: YES  New sexual partners: No Musculoskeletal  Leg pain or swelling: No     Psychiatric (Psych)  Depression: No  Anxiety/Panic: No  Mood:  \"less angry, mood changes in a good way\" and \"good\"               3/19/2024   General Health   How would you rate your overall physical health? Good   Feel stress (tense, anxious, or unable to sleep) Only a little   (!) STRESS CONCERN      3/19/2024   Nutrition   Three or more servings of calcium each day? Yes   Diet: Regular (no restrictions)   How many servings of fruit and vegetables per day? (!) 2-3   How many sweetened beverages each day? 0-1         3/19/2024   Exercise   Days per week of moderate/strenous exercise 5 days   Average minutes spent exercising at this level 30 min         3/19/2024   Social Factors   Frequency of gathering with friends or relatives Twice a week   Worry food won't last until get money to buy more No   Food not last or not have enough money for food? No   Do you have housing?  Yes   Are you worried about losing your housing? No   Lack of transportation? No   Unable to get utilities (heat,electricity)? No         3/19/2024   Dental   Dentist two times every year? Yes         3/19/2024   TB Screening   Were you born outside of the [place]? No       Today's PHQ-2 Score:       3/19/2024     9:39 PM   PHQ-2 ( 1999 [employer])   Q1: Little interest or pleasure in doing things 0   Q2: Feeling down, depressed or hopeless 0   PHQ-2 Score 0   Q1: Little interest or pleasure in doing things Not at all   Q2: Feeling down, depressed or hopeless Not at all   PHQ-2 Score 0           3/19/2024   Substance Use   Alcohol more than 3/day or more than 7/wk No   Do you use any other substances recreationally? No     Social History     Tobacco Use    Smoking status: Never     Passive exposure: Past    Smokeless tobacco: Never   Vaping Use    Vaping Use: Never used " "  Substance Use Topics    Alcohol use: Yes     Alcohol/week: 1.7 standard drinks of alcohol     Comment: 7-14 drinks a week     Drug use: No           3/19/2024   STI Screening   New sexual partner(s) since last STI/HIV test? No   ASCVD Risk   The 10-year ASCVD risk score (Abhinav MALHOTRA, et al., 2019) is: 0.9%    Values used to calculate the score:      Age: 43 years      Sex: Male      Is Non- : No      Diabetic: No      Tobacco smoker: No      Systolic Blood Pressure: 131 mmHg      Is BP treated: Yes      HDL Cholesterol: 63 mg/dL      Total Cholesterol: 146 mg/dL        3/19/2024   Contraception/Family Planning   Questions about contraception or family planning No     Reviewed and updated as needed this visit by Provider   Tobacco    Problems  Med Hx  Surg Hx  Fam Hx  Soc Hx Sexual Activity               Objective    Exam  /87 (BP Location: Right arm, Patient Position: Sitting, Cuff Size: Adult Regular)   Pulse 70   Temp 97.9  F (36.6  C) (Temporal)   Resp 15   Ht 1.746 m (5' 8.75\")   Wt 76.4 kg (168 lb 6.4 oz)   SpO2 100%   BMI 25.05 kg/m     Estimated body mass index is 25.05 kg/m  as calculated from the following:    Height as of this encounter: 1.746 m (5' 8.75\").    Weight as of this encounter: 76.4 kg (168 lb 6.4 oz).    Physical Exam  Constitutional:       General: He is not in acute distress.     Appearance: He is well-developed. He is not ill-appearing.   HENT:      Head: Normocephalic and atraumatic.      Right Ear: There is impacted cerumen.      Left Ear: There is impacted cerumen.      Nose: Nose normal.      Mouth/Throat:      Mouth: Mucous membranes are moist.      Pharynx: No oropharyngeal exudate.   Eyes:      Extraocular Movements: Extraocular movements intact.      Conjunctiva/sclera: Conjunctivae normal.      Pupils: Pupils are equal, round, and reactive to light.   Cardiovascular:      Rate and Rhythm: Normal rate and regular rhythm.      Heart " sounds: Normal heart sounds. No murmur heard.  Pulmonary:      Effort: Pulmonary effort is normal.      Breath sounds: No wheezing or rales.   Abdominal:      General: Bowel sounds are normal.      Palpations: Abdomen is soft.      Tenderness: There is no abdominal tenderness.   Musculoskeletal:      Cervical back: Normal range of motion and neck supple. No rigidity.   Lymphadenopathy:      Cervical: No cervical adenopathy.   Skin:     General: Skin is warm and dry.      Findings: No rash.   Neurological:      General: No focal deficit present.      Mental Status: He is alert and oriented to person, place, and time.   Psychiatric:         Mood and Affect: Mood normal.         Behavior: Behavior normal.       Signed Electronically by: Ramesh Sterling DO

## 2024-03-20 NOTE — PROGRESS NOTES
"Preventive Care Visit  [facility]  Ramesh Sterling DO, Family Medicine  Mar 20, 2024      Assessment & Plan     Routine general medical examination at a health care facility  -Vitals: WNL  -Labs: UTD  -Immunizations: pneumonia, flu, hep b  -Colon cancer screening: cologuard ordered    Screen for colon cancer  - COLOGUARD(EX[employer] SCIENCES)    Gender dysphoria in adult  43yr assigned male at birth, prefers any/all pronouns. Started estrogen monotherapy 12/2023 with IM estradiol valerate 2mg weekly (shot day Wed). Noticing fat redistribution, breast tenderness, positive mood changes. No negative side effects. Will increase estrogen to 4mg weekly. Return for mid-cycle labs (estradiol, total testosterone, hgb). Prefers to avoid T blocker to support goals and sexual health. Follow up in 3 months.    - Estradiol  - Testosterone, total  - Hemoglobin  - estradiol valerate (DELESTROGEN) 20 MG/ML injection  Dispense: 5 mL; Refill: 0    Essential hypertension  BP at goal <140/90. Continue medication below. Renal function normal, microalbumin ordered.  - lisinopril-hydrochlorothiazide (ZESTORETIC) 20-12.5 MG tablet  Dispense: 90 tablet; Refill: 3  - microalbumin     Acute gout involving toe, unspecified cause, unspecified laterality  Recent flare in toe, which is rare. Likely precipitated by stress. Will send colchicine to use if develops another flare. Uric acid ordered.  - colchicine (COLCRYS) 0.6 MG tablet  Dispense: 30 tablet; Refill: 0  - Uric acid    Excessive cerumen in both ear canals  S/p earwash, did not tolerate. Recommend debrox drops.    Exercise induced asthma  Stable, well controlled. Using albuterol PRN.    BMI  Estimated body mass index is 25.05 kg/m  as calculated from the following:    Height as of this encounter: 1.746 m (5' 8.75\").    Weight as of this encounter: 76.4 kg (168 lb 6.4 oz).   Weight management plan: Discussed healthy diet and exercise " guidelines    Counseling  Appropriate preventive services were discussed with this patient, including applicable screening as appropriate for fall prevention, nutrition, physical activity, Tobacco-use cessation, weight loss and cognition.  Checklist reviewing preventive services available has been given to the patient.  Reviewed patient's diet, addressing concerns and/or questions.   He is at risk for psychosocial distress and has been provided with information to reduce risk.       Nakia Garza is a 43 year old, presenting for the following:  Physical and Recheck Medication        3/20/2024     7:49 AM   Additional Questions   Roomed by Tari WHITE        Health Care Directive  Patient does not have a Health Care Directive or Living Will: Discussed advance care planning with patient; information given to patient to review.    HPI    Exercise-3x/week, peloton  Colon cancer screening-DUE next month    Asthma   -albuterol prn  -stable    HTN  -lisinopril-hydrochlorothiazide 20-12.5    Gender dysphoria  -pronouns any/all  -preferred name: kathleen  -estradiol 2mg weekly (started 12/2023)  -not on T blocker due to goals    Consider increasing dose    Estrogen going well  Shot day-Wednesday    Recent gout flare up with stress        11/1/2022    12:05 PM 12/14/2023     9:23 AM 3/20/2024     7:49 AM   ACT Total Scores   ACT TOTAL SCORE (Goal Greater than or Equal to 20) 25 25 25   In the past 12 months, how many times did you visit the emergency room for your asthma without being admitted to the hospital? 0 0 0   In the past 12 months, how many times were you hospitalized overnight because of your asthma? 0 0 0            Review of Systems:        General  Fat redistribution: YES  Weight change: YES HEENT  Voice change: No     Cardiovascular (CV)  Chest Pains: No  Shortness of breath: No Chest  Decreased exercise tolerance:  No  Breast changes/development: YES     Gastrointestinal (GI)  Abdominal pain: No  Change in  "appetite: No Skin  Acne or oily skin: No  Change in hair: YES     Genitourinary ()  Abnormal vaginal bleeding: N/A   Decreased spontaneous erections: No  Change in libido: YES  New sexual partners: No Musculoskeletal  Leg pain or swelling: No     Psychiatric (Psych)  Depression: No  Anxiety/Panic: No  Mood:  \"less angry, mood changes in a good way\" and \"good\"               3/19/2024   General Health   How would you rate your overall physical health? Good   Feel stress (tense, anxious, or unable to sleep) Only a little   (!) STRESS CONCERN      3/19/2024   Nutrition   Three or more servings of calcium each day? Yes   Diet: Regular (no restrictions)   How many servings of fruit and vegetables per day? (!) 2-3   How many sweetened beverages each day? 0-1         3/19/2024   Exercise   Days per week of moderate/strenous exercise 5 days   Average minutes spent exercising at this level 30 min         3/19/2024   Social Factors   Frequency of gathering with friends or relatives Twice a week   Worry food won't last until get money to buy more No   Food not last or not have enough money for food? No   Do you have housing?  Yes   Are you worried about losing your housing? No   Lack of transportation? No   Unable to get utilities (heat,electricity)? No         3/19/2024   Dental   Dentist two times every year? Yes         3/19/2024   TB Screening   Were you born outside of the [place]? No       Today's PHQ-2 Score:       3/19/2024     9:39 PM   PHQ-2 ( 1999 [employer])   Q1: Little interest or pleasure in doing things 0   Q2: Feeling down, depressed or hopeless 0   PHQ-2 Score 0   Q1: Little interest or pleasure in doing things Not at all   Q2: Feeling down, depressed or hopeless Not at all   PHQ-2 Score 0           3/19/2024   Substance Use   Alcohol more than 3/day or more than 7/wk No   Do you use any other substances recreationally? No     Social History     Tobacco Use    Smoking status: Never     Passive exposure: Past    " "Smokeless tobacco: Never   Vaping Use    Vaping Use: Never used   Substance Use Topics    Alcohol use: Yes     Alcohol/week: 1.7 standard drinks of alcohol     Comment: 7-14 drinks a week     Drug use: No           3/19/2024   STI Screening   New sexual partner(s) since last STI/HIV test? No   ASCVD Risk   The 10-year ASCVD risk score (Abhinav MALHOTRA, et al., 2019) is: 0.9%    Values used to calculate the score:      Age: 43 years      Sex: Male      Is Non- : No      Diabetic: No      Tobacco smoker: No      Systolic Blood Pressure: 131 mmHg      Is BP treated: Yes      HDL Cholesterol: 63 mg/dL      Total Cholesterol: 146 mg/dL        3/19/2024   Contraception/Family Planning   Questions about contraception or family planning No     Reviewed and updated as needed this visit by Provider   Tobacco    Problems  Med Hx  Surg Hx  Fam Hx  Soc Hx Sexual Activity               Objective    Exam  /87 (BP Location: Right arm, Patient Position: Sitting, Cuff Size: Adult Regular)   Pulse 70   Temp 97.9  F (36.6  C) (Temporal)   Resp 15   Ht 1.746 m (5' 8.75\")   Wt 76.4 kg (168 lb 6.4 oz)   SpO2 100%   BMI 25.05 kg/m     Estimated body mass index is 25.05 kg/m  as calculated from the following:    Height as of this encounter: 1.746 m (5' 8.75\").    Weight as of this encounter: 76.4 kg (168 lb 6.4 oz).    Physical Exam  Constitutional:       General: He is not in acute distress.     Appearance: He is well-developed. He is not ill-appearing.   HENT:      Head: Normocephalic and atraumatic.      Right Ear: There is impacted cerumen.      Left Ear: There is impacted cerumen.      Nose: Nose normal.      Mouth/Throat:      Mouth: Mucous membranes are moist.      Pharynx: No oropharyngeal exudate.   Eyes:      Extraocular Movements: Extraocular movements intact.      Conjunctiva/sclera: Conjunctivae normal.      Pupils: Pupils are equal, round, and reactive to light.   Cardiovascular: "      Rate and Rhythm: Normal rate and regular rhythm.      Heart sounds: Normal heart sounds. No murmur heard.  Pulmonary:      Effort: Pulmonary effort is normal.      Breath sounds: No wheezing or rales.   Abdominal:      General: Bowel sounds are normal.      Palpations: Abdomen is soft.      Tenderness: There is no abdominal tenderness.   Musculoskeletal:      Cervical back: Normal range of motion and neck supple. No rigidity.   Lymphadenopathy:      Cervical: No cervical adenopathy.   Skin:     General: Skin is warm and dry.      Findings: No rash.   Neurological:      General: No focal deficit present.      Mental Status: He is alert and oriented to person, place, and time.   Psychiatric:         Mood and Affect: Mood normal.         Behavior: Behavior normal.       Signed Electronically by: Ramesh Sterling DO

## 2024-03-20 NOTE — NURSING NOTE
Patient identified using two patient identifiers.  Ear exam showing wax occlusion completed by provider.  Solution: warm water was placed in the bilateral ear(s) via irrigation tool: elephant ear.

## 2024-03-20 NOTE — NURSING NOTE
Prior to immunization administration, verified patients identity using patient s name and date of birth. Please see Immunization Activity for additional information.     Screening Questionnaire for Adult Immunization    Are you sick today?   No   Do you have allergies to medications, food, a vaccine component or latex?   Yes   Have you ever had a serious reaction after receiving a vaccination?   No   Do you have a long-term health problem with heart, lung, kidney, or metabolic disease (e.g., diabetes), asthma, a blood disorder, no spleen, complement component deficiency, a cochlear implant, or a spinal fluid leak?  Are you on long-term aspirin therapy?   Yes   Do you have cancer, leukemia, HIV/AIDS, or any other immune system problem?   No   Do you have a parent, brother, or sister with an immune system problem?   No   In the past 3 months, have you taken medications that affect  your immune system, such as prednisone, other steroids, or anticancer drugs; drugs for the treatment of rheumatoid arthritis, Crohn s disease, or psoriasis; or have you had radiation treatments?   No   Have you had a seizure, or a brain or other nervous system problem?   No   During the past year, have you received a transfusion of blood or blood    products, or been given immune (gamma) globulin or antiviral drug?   No   For women: Are you pregnant or is there a chance you could become       pregnant during the next month?   No   Have you received any vaccinations in the past 4 weeks?   No     Immunization questionnaire was positive for at least one answer.  Ruddyied jose e.      Patient instructed to remain in clinic for 15 minutes afterwards, and to report any adverse reactions.     Screening performed by Stella Levy MA on 3/20/2024 at 8:43 AM.

## 2024-03-20 NOTE — PATIENT INSTRUCTIONS
Preventive Care Advice   This is general advice given by our system to help you stay healthy. However, your care team may have specific advice just for you. Please talk to your care team about your preventive care needs.  Nutrition  Eat 5 or more servings of fruits and vegetables each day.  Try wheat bread, brown rice and whole grain pasta (instead of white bread, rice, and pasta).  Get enough calcium and vitamin D. Check the label on foods and aim for 100% of the RDA (recommended daily allowance).  Lifestyle  Exercise at least 150 minutes each week   (30 minutes a day, 5 days a week).  Do muscle strengthening activities 2 days a week. These help control your weight and prevent disease.  No smoking.  Wear sunscreen to prevent skin cancer.  Have a dental exam and cleaning every 6 months.  Yearly exams  See your health care team every year to talk about:  Any changes in your health.  Any medicines your care team has prescribed.  Preventive care, family planning, and ways to prevent chronic diseases.  Shots (vaccines)   HPV shots (up to age 26), if you've never had them before.  Hepatitis B shots (up to age 59), if you've never had them before.  COVID-19 shot: Get this shot when it's due.  Flu shot: Get a flu shot every year.  Tetanus shot: Get a tetanus shot every 10 years.  Pneumococcal, hepatitis A, and RSV shots: Ask your care team if you need these based on your risk.  Shingles shot (for age 50 and up).  General health tests  Diabetes screening:  Starting at age 35, Get screened for diabetes at least every 3 years.  If you are younger than age 35, ask your care team if you should be screened for diabetes.  Cholesterol test: At age 39, start having a cholesterol test every 5 years, or more often if advised.  Bone density scan (DEXA): At age 50, ask your care team if you should have this scan for osteoporosis (brittle bones).  Hepatitis C: Get tested at least once in your life.  STIs (sexually transmitted  infections)  Before age 24: Ask your care team if you should be screened for STIs.  After age 24: Get screened for STIs if you're at risk. You are at risk for STIs (including HIV) if:  You are sexually active with more than one person.  You don't use condoms every time.  You or a partner was diagnosed with a sexually transmitted infection.  If you are at risk for HIV, ask about PrEP medicine to prevent HIV.  Get tested for HIV at least once in your life, whether you are at risk for HIV or not.  Cancer screening tests  Cervical cancer screening: If you have a cervix, begin getting regular cervical cancer screening tests at age 21. Most people who have regular screenings with normal results can stop after age 65. Talk about this with your provider.  Breast cancer scan (mammogram): If you've ever had breasts, begin having regular mammograms starting at age 40. This is a scan to check for breast cancer.  Colon cancer screening: It is important to start screening for colon cancer at age 45.  Have a colonoscopy test every 10 years (or more often if you're at risk) Or, ask your provider about stool tests like a FIT test every year or Cologuard test every 3 years.  To learn more about your testing options, visit: https://[email or web address].bg/032980.pdf.  For help making a decision, visit: https://bit.ly/zq97580.  Prostate cancer screening test: If you have a prostate and are age 55 to 69, ask your provider if you would benefit from a yearly prostate cancer screening test.  Lung cancer screening: If you are a current or former smoker age 50 to 80, ask your care team if ongoing lung cancer screenings are right for you.  For informational purposes only. Not to replace the advice of your health care provider. Copyright   2023 [employer]. All rights reserved. Clinically reviewed by the [facility]. [employer] [id number] - REV 01/24.    Learning About Stress  What is stress?     Stress is your  body's response to a hard situation. Your body can have a physical, emotional, or mental response. Stress is a fact of life for most people, and it affects everyone differently. What causes stress for you may not be stressful for someone else.  A lot of things can cause stress. You may feel stress when you go on a job interview, take a test, or run a race. This kind of short-term stress is normal and even useful. It can help you if you need to work hard or react quickly. For example, stress can help you finish an important job on time.  Long-term stress is caused by ongoing stressful situations or events. Examples of long-term stress include long-term health problems, ongoing problems at work, or conflicts in your family. Long-term stress can harm your health.  How does stress affect your health?  When you are stressed, your body responds as though you are in danger. It makes hormones that speed up your heart, make you breathe faster, and give you a burst of energy. This is called the fight-or-flight stress response. If the stress is over quickly, your body goes back to normal and no harm is done.  But if stress happens too often or lasts too long, it can have bad effects. Long-term stress can make you more likely to get sick, and it can make symptoms of some diseases worse. If you tense up when you are stressed, you may develop neck, shoulder, or low back pain. Stress is linked to high blood pressure and heart disease.  Stress also harms your emotional health. It can make you ornelas, tense, or depressed. Your relationships may suffer, and you may not do well at work or school.  What can you do to manage stress?  You can try these things to help manage stress:   Do something active. Exercise or activity can help reduce stress. Walking is a great way to get started. Even everyday activities such as housecleaning or yard work can help.  Try yoga or evert chi. These techniques combine exercise and meditation. You may need  some training at first to learn them.  Do something you enjoy. For example, listen to music or go to a movie. Practice your hobby or do volunteer work.  Meditate. This can help you relax, because you are not worrying about what happened before or what may happen in the future.  Do guided imagery. Imagine yourself in any setting that helps you feel calm. You can use online videos, books, or a teacher to guide you.  Do breathing exercises. For example:  From a standing position, bend forward from the waist with your knees slightly bent. Let your arms dangle close to the floor.  Breathe in slowly and deeply as you return to a standing position. Roll up slowly and lift your head last.  Hold your breath for just a few seconds in the standing position.  Breathe out slowly and bend forward from the waist.  Let your feelings out. Talk, laugh, cry, and express anger when you need to. Talking with supportive friends or family, a counselor, or a holger leader about your feelings is a healthy way to relieve stress. Avoid discussing your feelings with people who make you feel worse.  Write. It may help to write about things that are bothering you. This helps you find out how much stress you feel and what is causing it. When you know this, you can find better ways to cope.  What can you do to prevent stress?  You might try some of these things to help prevent stress:  Manage your time. This helps you find time to do the things you want and need to do.  Get enough sleep. Your body recovers from the stresses of the day while you are sleeping.  Get support. Your family, friends, and community can make a difference in how you experience stress.  Limit your news feed. Avoid or limit time on social media or news that may make you feel stressed.  Do something active. Exercise or activity can help reduce stress. Walking is a great way to get started.  Where can you learn more?  Go to https://www.healthwise.net/patiented  Enter N032 in the  "search box to learn more about \"Learning About Stress.\"  Current as of: October 24, 2023               Content Version: 14.0    9205-1220 [employer].   Care instructions adapted under license by your healthcare professional. If you have questions about a medical condition or this instruction, always ask your healthcare professional. [employer] disclaims any warranty or liability for your use of this information.      "

## 2024-03-21 ENCOUNTER — MYC MEDICAL ADVICE (OUTPATIENT)
Dept: FAMILY MEDICINE | Facility: CLINIC | Age: 44
End: 2024-03-21
Payer: COMMERCIAL

## 2024-03-21 DIAGNOSIS — Z12.11 SCREEN FOR COLON CANCER: Primary | ICD-10-CM

## 2024-03-22 ENCOUNTER — LAB (OUTPATIENT)
Dept: LAB | Facility: CLINIC | Age: 44
End: 2024-03-22
Payer: COMMERCIAL

## 2024-03-22 DIAGNOSIS — F64.0 GENDER DYSPHORIA IN ADULT: ICD-10-CM

## 2024-03-22 DIAGNOSIS — I10 ESSENTIAL HYPERTENSION: ICD-10-CM

## 2024-03-22 DIAGNOSIS — M10.9 ACUTE GOUT INVOLVING TOE, UNSPECIFIED CAUSE, UNSPECIFIED LATERALITY: ICD-10-CM

## 2024-03-22 LAB — HGB BLD-MCNC: 13.9 G/DL (ref 13.3–17.7)

## 2024-03-22 PROCEDURE — 84403 ASSAY OF TOTAL TESTOSTERONE: CPT

## 2024-03-22 PROCEDURE — 82670 ASSAY OF TOTAL ESTRADIOL: CPT

## 2024-03-22 PROCEDURE — 85018 HEMOGLOBIN: CPT

## 2024-03-22 PROCEDURE — 84550 ASSAY OF BLOOD/URIC ACID: CPT

## 2024-03-22 PROCEDURE — 36415 COLL VENOUS BLD VENIPUNCTURE: CPT

## 2024-03-23 LAB
ESTRADIOL SERPL-MCNC: 447 PG/ML
URATE SERPL-MCNC: 6.2 MG/DL (ref 3.4–7)

## 2024-03-27 DIAGNOSIS — F64.0 GENDER DYSPHORIA IN ADULT: Primary | ICD-10-CM

## 2024-03-27 LAB — TESTOST SERPL-MCNC: 23 NG/DL (ref 240–950)

## 2024-04-01 ENCOUNTER — LAB (OUTPATIENT)
Dept: LAB | Facility: CLINIC | Age: 44
End: 2024-04-01
Payer: COMMERCIAL

## 2024-04-01 DIAGNOSIS — I10 ESSENTIAL HYPERTENSION: ICD-10-CM

## 2024-04-01 DIAGNOSIS — F64.0 GENDER DYSPHORIA IN ADULT: ICD-10-CM

## 2024-04-01 LAB
CREAT UR-MCNC: 31.3 MG/DL
ESTRADIOL SERPL-MCNC: 241 PG/ML
MICROALBUMIN UR-MCNC: <12 MG/L
MICROALBUMIN/CREAT UR: NORMAL MG/G{CREAT}

## 2024-04-01 PROCEDURE — 36415 COLL VENOUS BLD VENIPUNCTURE: CPT

## 2024-04-01 PROCEDURE — 82570 ASSAY OF URINE CREATININE: CPT

## 2024-04-01 PROCEDURE — 82670 ASSAY OF TOTAL ESTRADIOL: CPT

## 2024-04-01 PROCEDURE — 82043 UR ALBUMIN QUANTITATIVE: CPT

## 2024-04-19 ENCOUNTER — ORDERS ONLY (AUTO-RELEASED) (OUTPATIENT)
Dept: FAMILY MEDICINE | Facility: CLINIC | Age: 44
End: 2024-04-19
Payer: COMMERCIAL

## 2024-04-19 DIAGNOSIS — Z12.11 SCREEN FOR COLON CANCER: ICD-10-CM

## 2024-05-19 LAB — NONINV COLON CA DNA+OCC BLD SCRN STL QL: NEGATIVE

## 2024-06-04 ENCOUNTER — MYC MEDICAL ADVICE (OUTPATIENT)
Dept: FAMILY MEDICINE | Facility: CLINIC | Age: 44
End: 2024-06-04
Payer: COMMERCIAL

## 2024-06-05 NOTE — TELEPHONE ENCOUNTER
Dr. Sterling --    ADONIS. Upcoming visit with patient.     Kaleigh message:   Due to wearing a smart watch to bed, I'm now aware that my resting heart rate has been on the decline and now, at times, falls below 40bpm while sleeping and during periods of inactivity a heart rate in the high 40s during the day. I have noticed, once or twice, lightheadedness when standing up but I had chalked it up to dehydration (which I was). I have not had any issue with gross fatigue or issues working out, but I have noticed lower pulse during effort in general which I chalked up to better fitness. I note this for our upcoming visit to discuss.       ESTHER BragaN MAYURI  [facility]

## 2024-06-21 ENCOUNTER — OFFICE VISIT (OUTPATIENT)
Dept: FAMILY MEDICINE | Facility: CLINIC | Age: 44
End: 2024-06-21
Payer: COMMERCIAL

## 2024-06-21 VITALS
DIASTOLIC BLOOD PRESSURE: 64 MMHG | HEART RATE: 60 BPM | RESPIRATION RATE: 20 BRPM | OXYGEN SATURATION: 100 % | BODY MASS INDEX: 24.34 KG/M2 | SYSTOLIC BLOOD PRESSURE: 102 MMHG | WEIGHT: 163.6 LBS | TEMPERATURE: 97.6 F

## 2024-06-21 DIAGNOSIS — I10 ESSENTIAL HYPERTENSION: ICD-10-CM

## 2024-06-21 DIAGNOSIS — F64.0 GENDER DYSPHORIA IN ADULT: Primary | ICD-10-CM

## 2024-06-21 LAB — ESTRADIOL SERPL-MCNC: 426 PG/ML

## 2024-06-21 PROCEDURE — 84403 ASSAY OF TOTAL TESTOSTERONE: CPT | Performed by: STUDENT IN AN ORGANIZED HEALTH CARE EDUCATION/TRAINING PROGRAM

## 2024-06-21 PROCEDURE — 90471 IMMUNIZATION ADMIN: CPT | Performed by: STUDENT IN AN ORGANIZED HEALTH CARE EDUCATION/TRAINING PROGRAM

## 2024-06-21 PROCEDURE — 82670 ASSAY OF TOTAL ESTRADIOL: CPT | Performed by: STUDENT IN AN ORGANIZED HEALTH CARE EDUCATION/TRAINING PROGRAM

## 2024-06-21 PROCEDURE — 90746 HEPB VACCINE 3 DOSE ADULT IM: CPT | Performed by: STUDENT IN AN ORGANIZED HEALTH CARE EDUCATION/TRAINING PROGRAM

## 2024-06-21 PROCEDURE — 99214 OFFICE O/P EST MOD 30 MIN: CPT | Mod: 25 | Performed by: STUDENT IN AN ORGANIZED HEALTH CARE EDUCATION/TRAINING PROGRAM

## 2024-06-21 PROCEDURE — 36415 COLL VENOUS BLD VENIPUNCTURE: CPT | Performed by: STUDENT IN AN ORGANIZED HEALTH CARE EDUCATION/TRAINING PROGRAM

## 2024-06-21 RX ORDER — SYRINGE, DISPOSABLE, 1 ML
SYRINGE, EMPTY DISPOSABLE MISCELLANEOUS
Qty: 25 EACH | Refills: 10 | Status: CANCELLED | OUTPATIENT
Start: 2024-06-21

## 2024-06-21 ASSESSMENT — PAIN SCALES - GENERAL: PAINLEVEL: NO PAIN (0)

## 2024-06-21 NOTE — PROGRESS NOTES
Assessment & Plan     Gender dysphoria in adult  43yr assigned male at birth, prefers any/all pronouns. Started estrogen monotherapy 12/2023 with IM estradiol valerate. Currently taking estrogen IM 4mg weekly. Midcycle today. Noticing fat redistribution, breast tenderness, positive mood changes. No negative side effects. Prefers to avoid T blocker to support goals and sexual health. T was well suppressed last visit, and estradiol in a safe range <300.     Plan:  -will check testosterone and estradiol today  -pending labs will either continue current plan; change shots to every 5 days (due to effects wearing off around day 5-6), OR increase dose to estrogen 5-6mg weekly    - Testosterone total  - Estradiol  - Testosterone total  - Estradiol    Essential hypertension  BP on lower end of normal 102/64. Noticed rare/intermittent dizziness. If dizziness worsens or BP is <100/60, consider adjusting BP meds. Would recommend stopping hydrochlorothiazide and doing lisinopril only. No med changes made today.    Of note, patient is moving to [facility] in August. Advised to check in with me ~July. I can give 3 months of refills before they leave. Should establish with a new PCP there.      Nakia Meyer is a 44 year old, presenting for the following health issues:  Follow Up (/Gender F/U)        6/21/2024     7:59 AM   Additional Questions   Roomed by RADHA Chavez   Accompanied by Self     History of Present Illness       Reason for visit:  Gender affirming treatment monitoring.    He eats 2-3 servings of fruits and vegetables daily.He consumes 0 sweetened beverage(s) daily.He exercises with enough effort to increase his heart rate 30 to 60 minutes per day.  He exercises with enough effort to increase his heart rate 4 days per week. He is missing 1 dose(s) of medications per week.  He is not taking prescribed medications regularly due to remembering to take.            JEFE Meyer is a 44 year old individual that  uses any pronouns that presents today for follow up of:  feminizing hormone therapy.     43yr assigned male at birth, prefers any/all pronouns. Started estrogen monotherapy 12/2023. Currently on estrogen 4mg weekly. Prefers to avoid T blocker to support goals and sexual health. Follow up in 3 months.    Today  Noticing more potential morning effects, concerned T may be higher  Shot day-Monday   Moving to [facility], moving August  Got laid off    Past Surgical History:   Procedure Laterality Date    ABDOMEN SURGERY  2003    Appendectomy    ESOPHAGOSCOPY, GASTROSCOPY, DUODENOSCOPY (EGD), COMBINED N/A 11/6/2019    Procedure: ESOPHAGOGASTRODUODENOSCOPY (EGD);  Surgeon: Patricia Shearer MD;  Location:  OR    ORTHOPEDIC SURGERY  06/03    Knee arthroscopy (plica)    SURGICAL HISTORY OF -   7/03    right knee arthoscopy    Z APPENDECTOMY  1/04       Patient Active Problem List   Diagnosis    Seasonal allergic rhinitis    Exercise-induced bronchospasm    Gynecomastia, male    Eczema    Dyspepsia    Gender dysphoria in adult    Essential hypertension    Acute gout involving toe, unspecified cause, unspecified laterality       Current Outpatient Medications   Medication Sig Dispense Refill    Alcohol Swabs (ALCOHOL PADS) 70 % PADS 1 Pad once a week 100 each 3    Cetirizine HCl (ZYRTEC ALLERGY PO) Take by mouth daily      clobetasol (TEMOVATE) 0.05 % external ointment APPLY TO AFFECTED AREA ON BODY ONCE NIGHTLY FOR UP TO TWO WEEKS. TAKE TWO WEEKS OFF AND REPEAT AS NEEDED FOR FLARES.      colchicine (COLCRYS) 0.6 MG tablet Take 1.2 mg (2 tablets) at the first sign of flare, followed by 0.6 mg (1 tablet) after 1 hour; maximum total dose 1.8 mg/day on day 1. Initiate within 12 to 24 hours of flare onset. Day 2 and thereafter: take 0.6 mg (1 tablet) once or twice daily until flare resolves. 30 tablet 0    estradiol valerate (DELESTROGEN) 20 MG/ML injection Inject 0.2 mLs (4 mg) Subcutaneous once a week 5 mL 0     "fluticasone (FLONASE) 50 MCG/ACT nasal spray Spray 1 spray into both nostrils daily 16 g 8    lisinopril-hydrochlorothiazide (ZESTORETIC) 20-12.5 MG tablet Take 1 tablet by mouth daily 90 tablet 3    needle, disp, 18G X 1\" MISC Use to draw up weekly injection 25 each 10    Needle, Disp, 25G X 5/8\" MISC To use to inject hormones weekly 25 each 10    Sharps Container MISC To use to dispose of needles for weekly injection 1 each 11    syringe, disposable, (B-D SYRINGE LUER-HERIBERTO) 1 ML MISC To use with weekly injections 25 each 10    triamcinolone (KENALOG) 0.1 % external cream APPLY TO AFFECTED AREAS ON THE BODY 2X DAILY FOR UP TO 2 WEEKS AT A TIME. TAKE 2 WEEKS OFF. REPEAT FOR FLARES.      VENTOLIN  (90 Base) MCG/ACT inhaler Inhale 2 puffs into the lungs every 6 hours as needed for shortness of breath, wheezing or cough 18 g 11       History   Smoking Status    Never   Smokeless Tobacco    Never            Review of Systems:        General  Fat redistribution: YES  Weight change: YES HEENT  Voice change: No     Cardiovascular (CV)  Chest Pains: No  Shortness of breath: No Chest  Decreased exercise tolerance:  No  Breast changes/development: YES     Gastrointestinal (GI)  Abdominal pain: No  Change in appetite: YES Skin  Acne or oily skin: No  Change in hair: YES     Genitourinary ()  Abnormal vaginal bleeding: N/A   Decreased spontaneous erections: YES  Change in libido: YES  New sexual partners: No Musculoskeletal  Leg pain or swelling: No     Psychiatric (Psych)  Depression: No  Anxiety/Panic: No  Mood:  \"good\"               Objective    /64 (BP Location: Right arm, Patient Position: Sitting, Cuff Size: Adult Regular)   Pulse 60   Temp 97.6  F (36.4  C) (Temporal)   Resp 20   Wt 74.2 kg (163 lb 9.6 oz)   SpO2 100%   BMI 24.34 kg/m    Body mass index is 24.34 kg/m .    Physical Exam  Constitutional:       General: He is not in acute distress.     Appearance: He is not ill-appearing.   Pulmonary:    "   Effort: Pulmonary effort is normal.   Neurological:      General: No focal deficit present.      Mental Status: He is alert and oriented to person, place, and time.   Psychiatric:         Mood and Affect: Mood normal.                Signed Electronically by: Ramesh Sterling DO

## 2024-06-21 NOTE — NURSING NOTE
Prior to immunization administration, verified patients identity using patient s name and date of birth. Please see Immunization Activity for additional information.     Screening Questionnaire for Adult Immunization    Are you sick today?   No   Do you have allergies to medications, food, a vaccine component or latex?   Yes   Have you ever had a serious reaction after receiving a vaccination?   No   Do you have a long-term health problem with heart, lung, kidney, or metabolic disease (e.g., diabetes), asthma, a blood disorder, no spleen, complement component deficiency, a cochlear implant, or a spinal fluid leak?  Are you on long-term aspirin therapy?   Yes   Do you have cancer, leukemia, HIV/AIDS, or any other immune system problem?   No   Do you have a parent, brother, or sister with an immune system problem?   No   In the past 3 months, have you taken medications that affect  your immune system, such as prednisone, other steroids, or anticancer drugs; drugs for the treatment of rheumatoid arthritis, Crohn s disease, or psoriasis; or have you had radiation treatments?   No   Have you had a seizure, or a brain or other nervous system problem?   No   During the past year, have you received a transfusion of blood or blood    products, or been given immune (gamma) globulin or antiviral drug?   No   For women: Are you pregnant or is there a chance you could become       pregnant during the next month?   No   Have you received any vaccinations in the past 4 weeks?   Yes     Immunization questionnaire was positive for at least one answer.  Notified Dr. Ryan.      Patient instructed to remain in clinic for 15 minutes afterwards, and to report any adverse reactions.     Screening performed by Ruth Hall MA on 6/21/2024 at 8:27 AM.

## 2024-06-21 NOTE — PATIENT INSTRUCTIONS
Look for a new provider in [place]  Send me a [facility] ~end of July to check in  I can give you 3 months of refills before you go!    Send me a [facility] message if you are getting consistently dizzy, or your home BP is <100/<60 consistently. We could adjust your medications if this is the case.      Dr. Sterling      Thank you for coming to see me today! Here are a couple of pieces of information about my schedule and communication practices.     I am not in the clinic on Tuesdays. Non-urgent calls and messages received on Tuesdays will be addressed as soon as I am able when I am back in the office on the next business day. Urgent calls will be addressed by a covering clinician.       If lab work was done today as part of your evaluation you will generally be contacted via [facility], mail, or phone with the results within 7-10 days.  If there is an alarming/concerning result we will contact you by phone. Lab results come back at varying times, I generally wait until all labs are resulted before making comments on results. Please note, labs are automatically released to [facility] once available, but it may take a couple of days for my interpretation note to appear.      I try very hard to respond to medical messages with 2 business days of receiving them. Occasionally it takes me longer if I am trying to figure out the best way to respond and need to seek guidance, do some research or dig deeper into your medical history to come up with a helpful response.      If you need refills please contact your pharmacist. They will send a refill request to me to review. Please allow 3-5 business days for us to respond to all refill requests.      Please call or send a medical message with any questions or concerns. Thank you for trusting me to be part of your healthcare team!        Dr. Ramesh Sterling

## 2024-06-26 LAB — TESTOST SERPL-MCNC: 15 NG/DL (ref 240–950)

## 2024-06-27 ENCOUNTER — MYC MEDICAL ADVICE (OUTPATIENT)
Dept: FAMILY MEDICINE | Facility: CLINIC | Age: 44
End: 2024-06-27
Payer: COMMERCIAL

## 2024-06-27 DIAGNOSIS — F64.0 GENDER DYSPHORIA IN ADULT: ICD-10-CM

## 2024-06-28 RX ORDER — ESTRADIOL VALERATE 20 MG/ML
INJECTION INTRAMUSCULAR
Qty: 5 ML | Refills: 1 | Status: SHIPPED | OUTPATIENT
Start: 2024-06-28 | End: 2024-08-02

## 2024-06-28 NOTE — TELEPHONE ENCOUNTER
Dr. Sterling - see patient MyChart with update on estradiol. Advisement appreciated on any update to POC.    ESTHER SchererN, RN (she/her)  [facility] RN

## 2024-07-01 NOTE — TELEPHONE ENCOUNTER
"Dr. Sterling-Please review and advise:    \"Good morning Dr. tSerling and Team!     Should I wait a couple dosing weeks before scheduling labs or should I schedule labs immediately?     Thank you for the response!  Have a madonna week of the 4th!\"    Thank you!  ESTHER HongN, RN-BC  [facility]    "

## 2024-08-02 ENCOUNTER — MYC MEDICAL ADVICE (OUTPATIENT)
Dept: FAMILY MEDICINE | Facility: CLINIC | Age: 44
End: 2024-08-02
Payer: COMMERCIAL

## 2024-08-02 DIAGNOSIS — F64.0 GENDER DYSPHORIA IN ADULT: ICD-10-CM

## 2024-08-02 DIAGNOSIS — I10 ESSENTIAL HYPERTENSION: ICD-10-CM

## 2024-08-02 RX ORDER — ESTRADIOL VALERATE 20 MG/ML
INJECTION INTRAMUSCULAR
Qty: 5 ML | Refills: 0 | Status: SHIPPED | OUTPATIENT
Start: 2024-08-02

## 2024-08-02 RX ORDER — LISINOPRIL AND HYDROCHLOROTHIAZIDE 12.5; 2 MG/1; MG/1
1 TABLET ORAL DAILY
Qty: 90 TABLET | Refills: 3 | Status: SHIPPED | OUTPATIENT
Start: 2024-08-02

## 2024-08-02 RX ORDER — SYRINGE, DISPOSABLE, 1 ML
SYRINGE, EMPTY DISPOSABLE MISCELLANEOUS
Qty: 50 EACH | Refills: 0 | Status: SHIPPED | OUTPATIENT
Start: 2024-08-02 | End: 2024-09-03

## 2024-08-02 NOTE — TELEPHONE ENCOUNTER
" -- requests pended    \"You suggested at my last visit providing a prescription for 3mo. of medication and injection supplies before my family relocates. I am going to be at the [address] [facility] for my lab retake tomorrow at 9:30, would it be possible to have that script filled at the [facility] there at that time? My usual pharmacy has been having trouble filling my scripts, for both estradiol and different syringes over the past couple of months, and with a week left I d appreciate avoiding [facility] issues if at all possible.      Thank you and your team again for being great. I am going to miss having you as my doctor!\"    Nova Diop RN  [facility]    "

## 2024-08-03 ENCOUNTER — LAB (OUTPATIENT)
Dept: LAB | Facility: CLINIC | Age: 44
End: 2024-08-03
Payer: COMMERCIAL

## 2024-08-03 DIAGNOSIS — F64.0 GENDER DYSPHORIA IN ADULT: ICD-10-CM

## 2024-08-03 LAB — ESTRADIOL SERPL-MCNC: 279 PG/ML

## 2024-08-03 PROCEDURE — 36415 COLL VENOUS BLD VENIPUNCTURE: CPT

## 2024-08-03 PROCEDURE — 82670 ASSAY OF TOTAL ESTRADIOL: CPT

## 2024-08-03 PROCEDURE — 84403 ASSAY OF TOTAL TESTOSTERONE: CPT

## 2024-08-06 LAB — TESTOST SERPL-MCNC: 19 NG/DL (ref 240–950)

## 2024-08-30 ENCOUNTER — MYC REFILL (OUTPATIENT)
Dept: FAMILY MEDICINE | Facility: CLINIC | Age: 44
End: 2024-08-30
Payer: COMMERCIAL

## 2024-08-30 DIAGNOSIS — F64.0 GENDER DYSPHORIA IN ADULT: ICD-10-CM

## 2024-09-03 RX ORDER — SYRINGE, DISPOSABLE, 1 ML
SYRINGE, EMPTY DISPOSABLE MISCELLANEOUS
Qty: 50 EACH | Refills: 0 | Status: SHIPPED | OUTPATIENT
Start: 2024-09-03

## (undated) DEVICE — SYR 30ML SLIP TIP W/O NDL 302833

## (undated) DEVICE — KIT ENDO FIRST STEP DISINFECTANT 200ML W/POUCH EP-4

## (undated) DEVICE — SUCTION CATH AIRLIFE TRI-FLO W/CONTROL PORT 14FR  T60C

## (undated) DEVICE — KIT ENDO TURNOVER/PROCEDURE CARRY-ON 101822

## (undated) DEVICE — ENDO BITE BLOCK ADULT OMNI-BLOC

## (undated) DEVICE — SUCTION MANIFOLD NEPTUNE 2 SYS 1 PORT 702-025-000

## (undated) DEVICE — SOL WATER IRRIG 1000ML BOTTLE 2F7114

## (undated) DEVICE — TUBING SUCTION 12"X1/4" N612

## (undated) RX ORDER — FENTANYL CITRATE 50 UG/ML
INJECTION, SOLUTION INTRAMUSCULAR; INTRAVENOUS
Status: DISPENSED
Start: 2019-11-06